# Patient Record
Sex: MALE | Race: WHITE | NOT HISPANIC OR LATINO | Employment: FULL TIME | URBAN - METROPOLITAN AREA
[De-identification: names, ages, dates, MRNs, and addresses within clinical notes are randomized per-mention and may not be internally consistent; named-entity substitution may affect disease eponyms.]

---

## 2017-01-05 ENCOUNTER — HOSPITAL ENCOUNTER (OUTPATIENT)
Dept: OTHER | Facility: HOSPITAL | Age: 57
Setting detail: SPECIMEN
Discharge: HOME | End: 2017-01-05
Attending: ORTHOPAEDIC SURGERY | Admitting: ORTHOPAEDIC SURGERY

## 2019-04-01 ENCOUNTER — HOSPITAL ENCOUNTER (OUTPATIENT)
Dept: FAMILY MEDICINE CLINIC | Facility: CLINIC | Age: 59
Setting detail: SPECIMEN
Discharge: HOME | End: 2019-04-01
Attending: NURSE PRACTITIONER | Admitting: NURSE PRACTITIONER

## 2019-04-01 LAB
ALBUMIN SERPL-MCNC: 3.9 G/DL (ref 3.5–4.8)
ALBUMIN/GLOB SERPL: 1.7 {RATIO} (ref 1–1.7)
ALP SERPL-CCNC: 81 IU/L (ref 32–91)
ALT SERPL-CCNC: 25 IU/L (ref 17–63)
ANION GAP SERPL CALC-SCNC: 13.4 MMOL/L (ref 10–20)
AST SERPL-CCNC: 18 IU/L (ref 15–41)
BASOPHILS # BLD AUTO: 0.1 10*3/UL (ref 0–0.2)
BASOPHILS NFR BLD AUTO: 1 % (ref 0–2)
BILIRUB SERPL-MCNC: 0.4 MG/DL (ref 0.3–1.2)
BUN SERPL-MCNC: 19 MG/DL (ref 8–20)
BUN/CREAT SERPL: 21.1 (ref 6.2–20.3)
CALCIUM SERPL-MCNC: 9.3 MG/DL (ref 8.9–10.3)
CHLORIDE SERPL-SCNC: 102 MMOL/L (ref 101–111)
CHOLEST SERPL-MCNC: 202 MG/DL
CHOLEST/HDLC SERPL: 5.3 {RATIO}
CONV CO2: 28 MMOL/L (ref 22–32)
CONV LDL CHOLESTEROL DIRECT: 154 MG/DL (ref 0–100)
CONV TOTAL PROTEIN: 6.2 G/DL (ref 6.1–7.9)
CREAT UR-MCNC: 0.9 MG/DL (ref 0.7–1.2)
DIFFERENTIAL METHOD BLD: (no result)
EOSINOPHIL # BLD AUTO: 0.1 10*3/UL (ref 0–0.3)
EOSINOPHIL # BLD AUTO: 2 % (ref 0–3)
ERYTHROCYTE [DISTWIDTH] IN BLOOD BY AUTOMATED COUNT: 13.6 % (ref 11.5–14.5)
GLOBULIN UR ELPH-MCNC: 2.3 G/DL (ref 2.5–3.8)
GLUCOSE SERPL-MCNC: 85 MG/DL (ref 65–99)
HCT VFR BLD AUTO: 49.9 % (ref 40–54)
HDLC SERPL-MCNC: 38 MG/DL
HGB BLD-MCNC: 16.7 G/DL (ref 14–18)
LDLC/HDLC SERPL: 4.1 {RATIO}
LIPID INTERPRETATION: ABNORMAL
LYMPHOCYTES # BLD AUTO: 2.4 10*3/UL (ref 0.8–4.8)
LYMPHOCYTES NFR BLD AUTO: 28 % (ref 18–42)
MCH RBC QN AUTO: 32.5 PG (ref 26–32)
MCHC RBC AUTO-ENTMCNC: 33.4 G/DL (ref 32–36)
MCV RBC AUTO: 97.2 FL (ref 80–94)
MONOCYTES # BLD AUTO: 0.9 10*3/UL (ref 0.1–1.3)
MONOCYTES NFR BLD AUTO: 10 % (ref 2–11)
NEUTROPHILS # BLD AUTO: 5.1 10*3/UL (ref 2.3–8.6)
NEUTROPHILS NFR BLD AUTO: 59 % (ref 50–75)
NRBC BLD AUTO-RTO: 0 /100{WBCS}
NRBC/RBC NFR BLD MANUAL: 0 10*3/UL
PLATELET # BLD AUTO: 182 10*3/UL (ref 150–450)
PMV BLD AUTO: 8.1 FL (ref 7.4–10.4)
POTASSIUM SERPL-SCNC: 4.4 MMOL/L (ref 3.6–5.1)
RBC # BLD AUTO: 5.14 10*6/UL (ref 4.6–6)
SODIUM SERPL-SCNC: 139 MMOL/L (ref 136–144)
TRIGL SERPL-MCNC: 181 MG/DL
VLDLC SERPL CALC-MCNC: 10 MG/DL
WBC # BLD AUTO: 8.6 10*3/UL (ref 4.5–11.5)

## 2019-06-01 ENCOUNTER — TRANSCRIBE ORDERS (OUTPATIENT)
Dept: SLEEP MEDICINE | Facility: HOSPITAL | Age: 59
End: 2019-06-01

## 2019-06-01 DIAGNOSIS — R06.83 SNORING: Primary | ICD-10-CM

## 2019-06-18 ENCOUNTER — TELEPHONE (OUTPATIENT)
Dept: FAMILY MEDICINE CLINIC | Facility: CLINIC | Age: 59
End: 2019-06-18

## 2019-06-18 DIAGNOSIS — J44.9 COPD, MILD (HCC): Primary | ICD-10-CM

## 2019-06-18 DIAGNOSIS — R05.9 COUGH: ICD-10-CM

## 2019-06-18 NOTE — TELEPHONE ENCOUNTER
You are wanting patient to have PFT pre/post Bronchodilator. Please create order/referral and diagnosis in chart then this will fall in to my work queue to process referral.   PER Epic TEAM

## 2019-06-20 ENCOUNTER — TRANSCRIBE ORDERS (OUTPATIENT)
Dept: SLEEP MEDICINE | Facility: HOSPITAL | Age: 59
End: 2019-06-20

## 2019-06-27 ENCOUNTER — HOSPITAL ENCOUNTER (OUTPATIENT)
Dept: RESPIRATORY THERAPY | Facility: HOSPITAL | Age: 59
Discharge: HOME | End: 2019-06-27
Admitting: FAMILY MEDICINE

## 2019-06-27 VITALS — BODY MASS INDEX: 44.1 KG/M2 | WEIGHT: 315 LBS | HEART RATE: 67 BPM | HEIGHT: 71 IN | OXYGEN SATURATION: 99 %

## 2019-06-27 DIAGNOSIS — J44.9 COPD, MILD (HCC): ICD-10-CM

## 2019-06-27 DIAGNOSIS — R05.9 COUGH: ICD-10-CM

## 2019-06-27 PROCEDURE — 94640 AIRWAY INHALATION TREATMENT: CPT

## 2019-06-27 PROCEDURE — 94726 PLETHYSMOGRAPHY LUNG VOLUMES: CPT

## 2019-06-27 PROCEDURE — 94729 DIFFUSING CAPACITY: CPT

## 2019-06-27 PROCEDURE — 94060 EVALUATION OF WHEEZING: CPT

## 2019-06-27 RX ORDER — ALBUTEROL SULFATE 2.5 MG/3ML
2.5 SOLUTION RESPIRATORY (INHALATION) ONCE AS NEEDED
Status: COMPLETED | OUTPATIENT
Start: 2019-06-27 | End: 2019-06-27

## 2019-06-27 RX ADMIN — ALBUTEROL SULFATE 2.5 MG: 2.5 SOLUTION RESPIRATORY (INHALATION) at 16:21

## 2019-07-10 RX ORDER — HYDROCODONE BITARTRATE AND ACETAMINOPHEN 10; 325 MG/1; MG/1
1 TABLET ORAL 2 TIMES DAILY PRN
Qty: 60 TABLET | Refills: 0 | Status: CANCELLED | OUTPATIENT
Start: 2019-07-10

## 2019-07-10 RX ORDER — HYDROCODONE BITARTRATE AND ACETAMINOPHEN 10; 325 MG/1; MG/1
1 TABLET ORAL 2 TIMES DAILY PRN
Refills: 0 | COMMUNITY
Start: 2019-06-14 | End: 2019-07-11 | Stop reason: SDUPTHER

## 2019-07-11 ENCOUNTER — TELEPHONE (OUTPATIENT)
Dept: FAMILY MEDICINE CLINIC | Facility: CLINIC | Age: 59
End: 2019-07-11

## 2019-07-11 ENCOUNTER — OFFICE VISIT (OUTPATIENT)
Dept: SLEEP MEDICINE | Facility: HOSPITAL | Age: 59
End: 2019-07-11

## 2019-07-11 VITALS
OXYGEN SATURATION: 95 % | SYSTOLIC BLOOD PRESSURE: 132 MMHG | DIASTOLIC BLOOD PRESSURE: 83 MMHG | WEIGHT: 311.8 LBS | BODY MASS INDEX: 43.65 KG/M2 | HEART RATE: 68 BPM | HEIGHT: 71 IN

## 2019-07-11 DIAGNOSIS — G47.30 SLEEP APNEA, UNSPECIFIED TYPE: Primary | ICD-10-CM

## 2019-07-11 PROCEDURE — G0463 HOSPITAL OUTPT CLINIC VISIT: HCPCS

## 2019-07-11 RX ORDER — HYDROCODONE BITARTRATE AND ACETAMINOPHEN 10; 325 MG/1; MG/1
1 TABLET ORAL 2 TIMES DAILY PRN
Qty: 60 TABLET | Refills: 0 | Status: SHIPPED | OUTPATIENT
Start: 2019-07-11 | End: 2019-08-27 | Stop reason: SDUPTHER

## 2019-07-11 NOTE — PROGRESS NOTES
SLEEP MEDICINE CONSULT      Patient Identification:     Name: Chi Ramirez II   Age: 58 y.o.   Gender: male   : 1960   MRN: [id number]     Date of consult: 2019    PCP/Referring Physician(s):     PCP: Shivam Mixon MD  Referring Provider: Shivam Mixon MD      Chief Complaint:     Nocturnal snores associated with suspected apneic events and daytime hypersomnolence.  History of Present Illness:   Is a very pleasant 58 years old  gentleman who has felt tired and somnolent during the daytime.  He has dozed off during sedentary activities.  He has dozed off at work.  He has found his  sleep quite un restorative.  He has snored quite loudly at night.  His wife has suspected apneic events. She made him aware of his uneven nocturnal breathing pattern..  He has discussed his symptoms with his primary care physician.  He has been referred to sleep center for further management.    His usual bedtime is between 9:30 PM to 10.30 p.m. usually watches TV before dozing off.  It may take him 30 minutes to fall asleep.  He likes ambient noise.  His fan runs all night.  He finds the noise soothing.  He usually wakes up at 4:30 in the morning to start his day.  As mentioned above he has snored for some time.  His snoring has become much more loud and disruptive in last 2 years.  His wife has suspected apneic events as she has described his respiratory events as shallow breathing.  He denies any symptoms of headache or heartburn at night or in the morning.  he has occasionally woken up in the morning with sinus congestion.  He has frequently woken up at night with dry mouth.    He sometimes has difficulty falling asleep.  He has often taken sleeping aids in the past in order to fall asleep.  Any fluctuation in the room temperature has bothered him.    He sometimes dream at night.  Denies any symptoms of nightmares.  Though his vivid dreams has some times woken him up.  He is a restless sleeper.  Isabella  and turn at night.  He throws his arms and legs in his sleep.  He has been made aware of his habit of kicking in his  sleep.  No leg cramps. No  symptoms of hypnopompic or hypnagogic hallucinations.  No symptoms of bruxism.    He wakes up in the morning tired and somnolent.  He has to sit at the edge of the bed for at least 15 minutes in order to get his bearings.  He likes to drink 3 cups of coffee in the morning.  One soda pop during the daytime 16 ounces of tea after work.  He likes to smoke 30 cigarettes/day he has smoked most in the morning before work and after work.    He is to doze off during sedentary activities as high chance of dozing off or sitting and reading sitting inactive in a public place or laying down to rest in the afternoon when circumstances permit him to do so.  He can easily doze off if he sitting quietly after lunch.  His Onley sleepiness score is 17/24.    Allergies, Medications, and Past History:   Allergies:    Allergies not on file  Current Medications:    Prior to Admission medications    Medication Sig Start Date End Date Taking? Authorizing Provider   fluticasone-salmeterol (ADVAIR DISKUS) 250-50 MCG/DOSE DISKUS Inhale 1 puff 2 (Two) Times a Day. 7/5/19   Shivam Mixon MD   HYDROcodone-acetaminophen (NORCO)  MG per tablet Take 1 tablet by mouth 2 (Two) Times a Day As Needed for Moderate Pain  or Severe Pain . 7/11/19   Shivam Mixon MD   HYDROcodone-acetaminophen (NORCO)  MG per tablet Take 1 tablet by mouth 2 (Two) Times a Day As Needed. 6/14/19 7/11/19  ProviderMinnie MD     Past Medical History:    Past Medical History:   Diagnosis Date   • Allergic rhinitis    • Erectile dysfunction    • Genital herpes       Past Surgical History:    Past Surgical History:   Procedure Laterality Date   • LIPOMA EXCISION     • OTHER SURGICAL HISTORY      LT KNEE MENISCAL TEAR   • OTHER SURGICAL HISTORY Left     SHOULDER ROTATOR CUFF   • VASECTOMY        Social History:  "   Social History     Tobacco Use   • Smoking status: Current Every Day Smoker   • Smokeless tobacco: Never Used   Substance Use Topics   • Alcohol use: Yes     Frequency: Monthly or less   • Drug use: No     Family Medical History:  History reviewed. No pertinent family history.      Review of Systems:   Constitutional:  Denies fever or chills.  Continues to gain weight.   Eyes:  Denies change in visual acuity   HENT: He experiences nasal congestion and r post nasal drainage .  He becomes more symptomatic during spring and less symptomatic during fall.  He has never been tested for allergies.  He does not take any medications for it.   Respiratory:  Denies cough, shortness of breath .  He occasionally has dyspnea on exertion  .  He was hospitalized for pneumonia after clearing the past year field.  He has a diagnosis of COPD.  He is a current smoker.  Cardiovascular:  Denies chest pain or edema.   GI:  Denies abdominal pain, nausea, vomiting, bloody stools or diarrhea   :  Denies dysuria or nocturia   Musculoskeletal: Arthritis involving different joints.  Takes narcotic for pain relief.  Integument:  Denies rash   Neurologic:  Denies headache, focal weakness or sensory changes. No history of stroke or seizures.   Endocrine:  Denies polyuria or polydipsia   Lymphatic:  Denies swollen glands   Psychiatric:  Denies depression, anxiety or claustrophobia      Physical Exam:     Vitals:    07/11/19 1600   BP: 132/83   Pulse: 68   SpO2: 95%   Weight: (!) 141 kg (311 lb 12.8 oz)   Height: 180.3 cm (71\")     HEENT: Head is normocephalic, atraumatic, normal distribution of hair. Pupils reactive to light. Ocular movements intact.  Mild nasal congestion on sniff test. No deviated nasal septum. No micrognathia.  Throat: Mallapatti stage 4, tongue midline, mucosa moist.  Neck: no JVD, thyromegaly, mass, +midline trachea, Neck circumference 21 inches.  Short neck.  Lungs: clear, no wheeze, rhonchi, crackles.  Mildly decreased " air entry bilateral lower lobes.  He is able to speak in full sentences.  Cardiovascular: S1, S2, RRR no murmurs, rubs or gallops  Abd: +BS's soft NT/ND, no CVA tenderness.  Obese.   Ext: Trace edema, cyanosis, clubbing, pulses intact  Neuro: Awake alert, oriented to time place and person. Grossly intact to motor, sensory cerebral, and cerebellar (without focal deficit)  Psych: No overt beba, depression, psychosis or inappropriate behavior  Skin: No rash, cellulitis, or ulcerations.  No facial rash or erosions      Assessment/Plan:   Sleep apnea unspecified: This is a very pleasant gentleman who has snored for a long time.  His wife has suspected apneic events.  He has felt tired and somnolent during the daytime.  He has dozed off during sedentary activities.  He carries a CDL license.  Obstructive sleep apnea has to be ruled out.  Recommendation.  Split-night polysomnogram is recommended.  Obesity; based on BMI 43.5.  He continues to gain weight.  Recommendation.  Aggressive weight loss is recommended.  Allergic rhinitis; he has remained symptomatic.  His symptoms worsens during spring.  He was recently hospitalized with pneumonia after getting a field.  Recommendation.  Aggressive therapy is recommended.  He may benefit from allergy testing.    Driving instructions. Patient is advised to avoid driving if tired or somnolent. To avoid all alcoholic beverages or medications causing somnolence.         Diagnosis Plan   1. Sleep apnea, unspecified type  Polysomnography 4 or More Parameters     No orders of the defined types were placed in this encounter.    Orders Placed This Encounter   Procedures   • Polysomnography 4 or More Parameters     Standing Status:   Future     Standing Expiration Date:   7/11/2020     Order Specific Question:   May take own meds     Answer:   Yes     Order Specific Question:   Details     Answer:   O2 Implementation per Protocol         Dai Ramos MD  7/11/2019  4:48 PM

## 2019-08-02 RX ORDER — LOSARTAN POTASSIUM 100 MG/1
TABLET ORAL
Qty: 30 TABLET | Refills: 0 | Status: SHIPPED | OUTPATIENT
Start: 2019-08-02 | End: 2020-01-06 | Stop reason: SDUPTHER

## 2019-08-02 RX ORDER — METOPROLOL SUCCINATE 50 MG/1
TABLET, EXTENDED RELEASE ORAL
Qty: 30 TABLET | Refills: 0 | Status: SHIPPED | OUTPATIENT
Start: 2019-08-02 | End: 2020-05-26 | Stop reason: SDUPTHER

## 2019-08-27 RX ORDER — HYDROCODONE BITARTRATE AND ACETAMINOPHEN 10; 325 MG/1; MG/1
TABLET ORAL
Qty: 60 TABLET | Refills: 0 | Status: SHIPPED | OUTPATIENT
Start: 2019-08-27 | End: 2019-09-30

## 2019-09-30 ENCOUNTER — OFFICE VISIT (OUTPATIENT)
Dept: FAMILY MEDICINE CLINIC | Facility: CLINIC | Age: 59
End: 2019-09-30

## 2019-09-30 VITALS
SYSTOLIC BLOOD PRESSURE: 128 MMHG | RESPIRATION RATE: 16 BRPM | HEIGHT: 71 IN | DIASTOLIC BLOOD PRESSURE: 76 MMHG | HEART RATE: 73 BPM | WEIGHT: 306.6 LBS | OXYGEN SATURATION: 93 % | BODY MASS INDEX: 42.92 KG/M2 | TEMPERATURE: 97.9 F

## 2019-09-30 DIAGNOSIS — G47.10 HYPERSOMNIA: ICD-10-CM

## 2019-09-30 DIAGNOSIS — E66.01 CLASS 3 SEVERE OBESITY DUE TO EXCESS CALORIES WITH SERIOUS COMORBIDITY AND BODY MASS INDEX (BMI) OF 40.0 TO 44.9 IN ADULT (HCC): ICD-10-CM

## 2019-09-30 DIAGNOSIS — F17.200 TOBACCO USE DISORDER: ICD-10-CM

## 2019-09-30 DIAGNOSIS — M79.605 PAIN IN BOTH LOWER EXTREMITIES: ICD-10-CM

## 2019-09-30 DIAGNOSIS — Z01.89 NEED FOR ASSESSMENT FOR SLEEP APNEA: ICD-10-CM

## 2019-09-30 DIAGNOSIS — I10 ESSENTIAL HYPERTENSION: Primary | ICD-10-CM

## 2019-09-30 DIAGNOSIS — E55.9 VITAMIN D DEFICIENCY: ICD-10-CM

## 2019-09-30 DIAGNOSIS — J41.0 SIMPLE CHRONIC BRONCHITIS (HCC): ICD-10-CM

## 2019-09-30 DIAGNOSIS — M79.604 PAIN IN BOTH LOWER EXTREMITIES: ICD-10-CM

## 2019-09-30 PROBLEM — E66.9 OBESITY: Status: ACTIVE | Noted: 2019-04-01

## 2019-09-30 PROBLEM — R06.02 SHORTNESS OF BREATH: Status: ACTIVE | Noted: 2019-04-01

## 2019-09-30 PROBLEM — IMO0002 HYPOGONADISM: Status: ACTIVE | Noted: 2017-09-11

## 2019-09-30 PROBLEM — M79.606 PAIN OF LOWER EXTREMITY: Status: ACTIVE | Noted: 2019-05-13

## 2019-09-30 PROBLEM — J44.9 CHRONIC OBSTRUCTIVE PULMONARY DISEASE (HCC): Status: ACTIVE | Noted: 2019-06-12

## 2019-09-30 PROBLEM — M79.10 MYALGIA: Status: ACTIVE | Noted: 2017-04-20

## 2019-09-30 PROBLEM — M15.9 POLYOSTEOARTHRITIS: Status: ACTIVE | Noted: 2017-11-27

## 2019-09-30 PROCEDURE — 99214 OFFICE O/P EST MOD 30 MIN: CPT | Performed by: FAMILY MEDICINE

## 2019-09-30 RX ORDER — MODAFINIL 200 MG/1
200 TABLET ORAL DAILY
Qty: 30 TABLET | Refills: 5 | Status: SHIPPED | OUTPATIENT
Start: 2019-09-30 | End: 2021-02-02

## 2019-09-30 RX ORDER — HYDROCODONE BITARTRATE AND ACETAMINOPHEN 10; 325 MG/1; MG/1
1 TABLET ORAL 3 TIMES DAILY PRN
Qty: 90 TABLET | Refills: 0 | Status: SHIPPED | OUTPATIENT
Start: 2019-09-30 | End: 2019-10-31 | Stop reason: SDUPTHER

## 2019-10-01 ENCOUNTER — TELEPHONE (OUTPATIENT)
Dept: FAMILY MEDICINE CLINIC | Facility: CLINIC | Age: 59
End: 2019-10-01

## 2019-10-01 PROBLEM — Z01.89 NEED FOR ASSESSMENT FOR SLEEP APNEA: Status: ACTIVE | Noted: 2019-10-01

## 2019-10-01 NOTE — ASSESSMENT & PLAN NOTE
This patient desperately needs a sleep study, this is going to be a big issue for him.  This was discussed in detail with his job which should not be in jeopardy if he is doing what he needs to do to take care of his problem

## 2019-10-01 NOTE — ASSESSMENT & PLAN NOTE
Obesity is improving with lifestyle modifications.  Discussed the patient's BMI.  The BMI is above average; BMI management plan is completed.  General weight loss/lifestyle modification strategies discussed (elicit support from others; identify saboteurs; non-food rewards, etc).  Regular aerobic exercise program discussed.   Patient desperately needs to lose weight and I discussed this with him

## 2019-10-31 RX ORDER — HYDROCODONE BITARTRATE AND ACETAMINOPHEN 10; 325 MG/1; MG/1
TABLET ORAL
Qty: 90 TABLET | Refills: 0 | Status: SHIPPED | OUTPATIENT
Start: 2019-10-31 | End: 2019-12-08 | Stop reason: SDUPTHER

## 2019-12-08 DIAGNOSIS — M79.605 PAIN IN BOTH LOWER EXTREMITIES: Primary | ICD-10-CM

## 2019-12-08 DIAGNOSIS — M79.604 PAIN IN BOTH LOWER EXTREMITIES: Primary | ICD-10-CM

## 2019-12-09 RX ORDER — HYDROCODONE BITARTRATE AND ACETAMINOPHEN 10; 325 MG/1; MG/1
TABLET ORAL
Qty: 90 TABLET | Refills: 0 | Status: SHIPPED | OUTPATIENT
Start: 2019-12-09 | End: 2020-01-06 | Stop reason: SDUPTHER

## 2020-01-06 ENCOUNTER — OFFICE VISIT (OUTPATIENT)
Dept: FAMILY MEDICINE CLINIC | Facility: CLINIC | Age: 60
End: 2020-01-06

## 2020-01-06 VITALS
HEART RATE: 69 BPM | SYSTOLIC BLOOD PRESSURE: 134 MMHG | WEIGHT: 313.8 LBS | DIASTOLIC BLOOD PRESSURE: 79 MMHG | BODY MASS INDEX: 43.77 KG/M2 | RESPIRATION RATE: 18 BRPM

## 2020-01-06 DIAGNOSIS — R97.20 ELEVATED PSA: ICD-10-CM

## 2020-01-06 DIAGNOSIS — M79.605 PAIN IN BOTH LOWER EXTREMITIES: ICD-10-CM

## 2020-01-06 DIAGNOSIS — J41.0 SIMPLE CHRONIC BRONCHITIS (HCC): ICD-10-CM

## 2020-01-06 DIAGNOSIS — M79.604 PAIN IN BOTH LOWER EXTREMITIES: ICD-10-CM

## 2020-01-06 DIAGNOSIS — I10 ESSENTIAL HYPERTENSION: ICD-10-CM

## 2020-01-06 DIAGNOSIS — E78.00 PURE HYPERCHOLESTEROLEMIA: Primary | ICD-10-CM

## 2020-01-06 DIAGNOSIS — E55.9 VITAMIN D DEFICIENCY: ICD-10-CM

## 2020-01-06 DIAGNOSIS — F17.200 TOBACCO USE DISORDER: ICD-10-CM

## 2020-01-06 DIAGNOSIS — J44.1 COPD EXACERBATION (HCC): ICD-10-CM

## 2020-01-06 DIAGNOSIS — Z01.89 NEED FOR ASSESSMENT FOR SLEEP APNEA: ICD-10-CM

## 2020-01-06 DIAGNOSIS — Z23 NEED FOR IMMUNIZATION AGAINST INFLUENZA: ICD-10-CM

## 2020-01-06 PROCEDURE — 36415 COLL VENOUS BLD VENIPUNCTURE: CPT | Performed by: FAMILY MEDICINE

## 2020-01-06 PROCEDURE — 82306 VITAMIN D 25 HYDROXY: CPT | Performed by: FAMILY MEDICINE

## 2020-01-06 PROCEDURE — 84153 ASSAY OF PSA TOTAL: CPT | Performed by: FAMILY MEDICINE

## 2020-01-06 PROCEDURE — 99214 OFFICE O/P EST MOD 30 MIN: CPT | Performed by: FAMILY MEDICINE

## 2020-01-06 PROCEDURE — 80061 LIPID PANEL: CPT | Performed by: FAMILY MEDICINE

## 2020-01-06 RX ORDER — ATORVASTATIN CALCIUM 10 MG/1
TABLET, FILM COATED ORAL
COMMUNITY
Start: 2019-10-31 | End: 2020-05-26 | Stop reason: SDUPTHER

## 2020-01-06 RX ORDER — ERGOCALCIFEROL 1.25 MG/1
CAPSULE ORAL
COMMUNITY
Start: 2020-01-02 | End: 2020-01-06

## 2020-01-06 RX ORDER — OMEPRAZOLE 40 MG/1
CAPSULE, DELAYED RELEASE ORAL
COMMUNITY
Start: 2019-12-09 | End: 2020-03-06

## 2020-01-06 RX ORDER — LOSARTAN POTASSIUM 100 MG/1
100 TABLET ORAL DAILY
Qty: 90 TABLET | Refills: 3 | Status: SHIPPED | OUTPATIENT
Start: 2020-01-06 | End: 2021-01-28

## 2020-01-06 RX ORDER — ERGOCALCIFEROL 1.25 MG/1
50000 CAPSULE ORAL
Qty: 12 CAPSULE | Refills: 3 | COMMUNITY
Start: 2020-01-06 | End: 2020-03-16 | Stop reason: SDUPTHER

## 2020-01-06 RX ORDER — TRIAMCINOLONE ACETONIDE 40 MG/ML
80 INJECTION, SUSPENSION INTRA-ARTICULAR; INTRAMUSCULAR ONCE
Status: DISCONTINUED | OUTPATIENT
Start: 2020-01-06 | End: 2021-02-02

## 2020-01-06 RX ORDER — FLUTICASONE PROPIONATE 50 MCG
2 SPRAY, SUSPENSION (ML) NASAL DAILY
Qty: 1 BOTTLE | Refills: 11 | Status: SHIPPED | OUTPATIENT
Start: 2020-01-06 | End: 2020-06-24 | Stop reason: SDUPTHER

## 2020-01-06 RX ORDER — HYDROCODONE BITARTRATE AND ACETAMINOPHEN 10; 325 MG/1; MG/1
1 TABLET ORAL 3 TIMES DAILY PRN
Qty: 90 TABLET | Refills: 0 | Status: SHIPPED | OUTPATIENT
Start: 2020-01-06 | End: 2020-02-10

## 2020-01-06 RX ORDER — CYCLOBENZAPRINE HCL 10 MG
10 TABLET ORAL
COMMUNITY
Start: 2019-08-16 | End: 2021-04-05

## 2020-01-06 NOTE — PROGRESS NOTES
Rooming Tab(CC,VS,Pt Hx,Fall Screen)  Chief Complaint   Patient presents with   • Hypertension   • Hyperlipidemia       Subjective 59-year-old here for follow-up of his chronic left leg pain and foot pain from an injury years ago.  He is taking his Norco 3 times a day and it is helping his pain.  Some days are better than others.  Patient complains of trouble  Sleeping because he gets so congested in his nose at nighttime.  It comes and goes and he has never been allergy tested.  Is mostly congestion he does not have any runny nose.  The patient was noted to have a PSA from 1.3 in 2018-2.5 in 2019.  He needs to have this reevaluated.  He has no prostate symptoms.  The patient feels like he is coughing more than usual and having some wheezing and rattling in his chest that started 2 nights ago.  He has no fever.  He has no chills.  He is not really short of breath.  The patient has vitamin D deficiency and he is taking ergocalciferol 50,000 units weekly.  He needs a refill of this as well.  Patient has hyperlipidemia and he needs to have his cholesterol rechecked.  He fasted today and he is not certain if he is taking his cholesterol medicine or not.  She has hypertension and he is taking his losartan and metoprolol tartrate he denies any chest pain dizziness or syncope.  She is stressed out because he has no money and is not able to afford anything or do anything he likes to do      I have reviewed and updated his medications, medical history and problem list during today's office visit.     Patient Care Team:  Shivam Mixon MD as PCP - General (Family Medicine)    Problem List Tab  Medications Tab  Synopsis Tab  Chart Review Tab  Care Everywhere Tab  Immunizations Tab  Patient History Tab    Social History     Tobacco Use   • Smoking status: Current Every Day Smoker   • Smokeless tobacco: Never Used   Substance Use Topics   • Alcohol use: Yes     Frequency: Monthly or less       Review of Systems    Constitutional: Negative for chills, fatigue and fever.   HENT: Positive for congestion. Negative for nosebleeds.    Eyes: Negative for double vision.   Respiratory: Positive for cough and wheezing. Negative for chest tightness and shortness of breath.    Cardiovascular: Negative for chest pain and palpitations.   Gastrointestinal: Negative for blood in stool.   Genitourinary: Negative for dysuria and hematuria.   Neurological: Negative for dizziness and syncope.   Psychiatric/Behavioral: Negative for depressed mood.       Objective     Rooming Tab(CC,VS,Pt Hx,Fall Screen)  /79 (BP Location: Right arm, Patient Position: Sitting, Cuff Size: Large Adult)   Pulse 69   Resp 18   Wt (!) 142 kg (313 lb 12.8 oz)   BMI 43.77 kg/m²     Body mass index is 43.77 kg/m².    Physical Exam   Constitutional: He is oriented to person, place, and time. He appears well-developed and well-nourished. No distress.   Obese pleasant no acute distress   HENT:   Head: Normocephalic and atraumatic.   Nose: Nose normal.   Mouth/Throat: Oropharynx is clear and moist.   Congested nose, throat clear, TMs dull but not red, nontender sinuses   Eyes: Pupils are equal, round, and reactive to light. Conjunctivae, EOM and lids are normal.   Neck: Trachea normal and normal range of motion. Neck supple. No JVD present. Carotid bruit is not present. No thyroid mass and no thyromegaly present.   No carotid bruits   Cardiovascular: Normal rate, regular rhythm, normal heart sounds and intact distal pulses.   Pulmonary/Chest: Effort normal. No respiratory distress. He has wheezes.   Abdominal: Soft. Bowel sounds are normal.   Obese   Musculoskeletal:   No c/c/e   Neurological: He is alert and oriented to person, place, and time. No cranial nerve deficit.   Skin: Skin is warm and dry.   Psychiatric: He has a normal mood and affect. His speech is normal and behavior is normal. He is attentive.   Nursing note and vitals reviewed.       Statin Choice  Calculator  Data Reviewed:                   Assessment/Plan   Order Review Tab  Health Maintenance Tab  Patient Plan/Order Tab  Diagnoses and all orders for this visit:    1. Pure hypercholesterolemia (Primary)  Assessment & Plan:  Lipid abnormalities are Uncertain as the patient does not know if he is taking his medication.  Pharmacotherapy as ordered.  Lipids will be reassessed 4 months.    Orders:  -     Lipid Panel    2. Pain in both lower extremities  -     HYDROcodone-acetaminophen (NORCO)  MG per tablet; Take 1 tablet by mouth 3 (Three) Times a Day As Needed for Moderate Pain .  Dispense: 90 tablet; Refill: 0    3. Essential hypertension  Assessment & Plan:  Hypertension is improving with treatment.  Continue current treatment regimen.  Dietary sodium restriction.  Weight loss.  Regular aerobic exercise.  Continue current medications.  Blood pressure will be reassessed at the next regular appointment.      4. Vitamin D deficiency  Assessment & Plan:  Improved, continue ergocalciferol for now, check a level     Orders:  -     Vitamin D 25 Hydroxy    5. Elevated PSA  -     PSA DIAGNOSTIC    6. Simple chronic bronchitis (CMS/HCC)  -     triamcinolone acetonide (KENALOG-40) injection 80 mg    7. COPD exacerbation (CMS/HCC)  Assessment & Plan:  Worse  Strongly encourage patient to stop smoking cigarettes.  Kenalog 80 mg IM.  If persistent we will consider adding prednisone.  Recommend he restart an Advair disc as this seemed to help him at the time          8. Tobacco use disorder  Assessment & Plan:  Stop smoking      9. Need for assessment for sleep apnea  Assessment & Plan:  Patient is not going to do a sleep study unfortunately      Other orders  -     fluticasone (FLONASE) 50 MCG/ACT nasal spray; 2 sprays into the nostril(s) as directed by provider Daily.  Dispense: 1 bottle; Refill: 11  -     losartan (COZAAR) 100 MG tablet; Take 1 tablet by mouth Daily.  Dispense: 90 tablet; Refill: 3  -      fluticasone-salmeterol (ADVAIR DISKUS) 250-50 MCG/DOSE DISKUS; Inhale 1 puff 2 (Two) Times a Day.  Dispense: 60 each; Refill: 11  -     vitamin D (ERGOCALCIFEROL) 1.25 MG (38487 UT) capsule capsule; Take 1 capsule by mouth Every 7 (Seven) Days.  Dispense: 12 capsule; Refill: 3      Wrapup Tab  Return in about 4 months (around 5/6/2020) for Recheck.

## 2020-01-06 NOTE — ASSESSMENT & PLAN NOTE
Lipid abnormalities are Uncertain as the patient does not know if he is taking his medication.  Pharmacotherapy as ordered.  Lipids will be reassessed 4 months.

## 2020-01-06 NOTE — ASSESSMENT & PLAN NOTE
Worse  Strongly encourage patient to stop smoking cigarettes.  Kenalog 80 mg IM.  If persistent we will consider adding prednisone.  Recommend he restart an Advair disc as this seemed to help him at the time

## 2020-01-07 LAB
25(OH)D3 SERPL-MCNC: 45.7 NG/ML (ref 30–100)
CHOLEST SERPL-MCNC: 154 MG/DL (ref 0–200)
HDLC SERPL-MCNC: 45 MG/DL (ref 40–60)
LDLC SERPL CALC-MCNC: 91 MG/DL (ref 0–100)
LDLC/HDLC SERPL: 2.01 {RATIO}
PSA SERPL-MCNC: 2.49 NG/ML (ref 0–4)
TRIGL SERPL-MCNC: 92 MG/DL (ref 0–150)
VLDLC SERPL-MCNC: 18.4 MG/DL (ref 5–40)

## 2020-02-10 DIAGNOSIS — M79.605 PAIN IN BOTH LOWER EXTREMITIES: ICD-10-CM

## 2020-02-10 DIAGNOSIS — M79.604 PAIN IN BOTH LOWER EXTREMITIES: ICD-10-CM

## 2020-02-10 RX ORDER — HYDROCODONE BITARTRATE AND ACETAMINOPHEN 10; 325 MG/1; MG/1
TABLET ORAL
Qty: 90 TABLET | Refills: 0 | Status: SHIPPED | OUTPATIENT
Start: 2020-02-10 | End: 2020-03-16 | Stop reason: SDUPTHER

## 2020-03-06 RX ORDER — OMEPRAZOLE 40 MG/1
CAPSULE, DELAYED RELEASE ORAL
Qty: 90 CAPSULE | Refills: 3 | Status: SHIPPED | OUTPATIENT
Start: 2020-03-06 | End: 2021-03-31 | Stop reason: SDUPTHER

## 2020-03-16 DIAGNOSIS — M79.604 PAIN IN BOTH LOWER EXTREMITIES: ICD-10-CM

## 2020-03-16 DIAGNOSIS — M79.605 PAIN IN BOTH LOWER EXTREMITIES: ICD-10-CM

## 2020-03-16 RX ORDER — ERGOCALCIFEROL 1.25 MG/1
50000 CAPSULE ORAL
Qty: 12 CAPSULE | Refills: 0 | Status: SHIPPED | OUTPATIENT
Start: 2020-03-16 | End: 2020-07-16

## 2020-03-16 RX ORDER — HYDROCODONE BITARTRATE AND ACETAMINOPHEN 10; 325 MG/1; MG/1
1 TABLET ORAL EVERY 8 HOURS PRN
Qty: 90 TABLET | Refills: 0 | Status: SHIPPED | OUTPATIENT
Start: 2020-03-16 | End: 2020-04-14 | Stop reason: SDUPTHER

## 2020-03-16 NOTE — TELEPHONE ENCOUNTER
PTS WIFE IS CALLING TO REQUEST REFILL FOR HYDROcodone-acetaminophen (NORCO)  MG per tablet    WIFE ALSO STATED THAT vitamin D (ERGOCALCIFEROL) 1.25 MG (83474 UT) capsule capsule IS SUPPOSED TO BE WRITTEN AS A 90 DAY BUT IT IDS BEING FILLED AS A 30 DAY PLEASE CALL PHARM AND ADVISE       PTS WIFE CAN BE REACHED [phone]

## 2020-04-14 DIAGNOSIS — M79.604 PAIN IN BOTH LOWER EXTREMITIES: ICD-10-CM

## 2020-04-14 DIAGNOSIS — M79.605 PAIN IN BOTH LOWER EXTREMITIES: ICD-10-CM

## 2020-04-15 RX ORDER — HYDROCODONE BITARTRATE AND ACETAMINOPHEN 10; 325 MG/1; MG/1
1 TABLET ORAL EVERY 8 HOURS PRN
Qty: 90 TABLET | Refills: 0 | Status: SHIPPED | OUTPATIENT
Start: 2020-04-15 | End: 2020-05-15 | Stop reason: SDUPTHER

## 2020-05-06 ENCOUNTER — OFFICE VISIT (OUTPATIENT)
Dept: FAMILY MEDICINE CLINIC | Facility: CLINIC | Age: 60
End: 2020-05-06

## 2020-05-06 VITALS
HEART RATE: 72 BPM | SYSTOLIC BLOOD PRESSURE: 130 MMHG | RESPIRATION RATE: 12 BRPM | BODY MASS INDEX: 44.77 KG/M2 | DIASTOLIC BLOOD PRESSURE: 80 MMHG | WEIGHT: 315 LBS

## 2020-05-06 DIAGNOSIS — F17.200 TOBACCO USE DISORDER: ICD-10-CM

## 2020-05-06 DIAGNOSIS — M79.605 PAIN IN BOTH LOWER EXTREMITIES: ICD-10-CM

## 2020-05-06 DIAGNOSIS — M79.604 PAIN IN BOTH LOWER EXTREMITIES: ICD-10-CM

## 2020-05-06 DIAGNOSIS — I10 ESSENTIAL HYPERTENSION: Primary | ICD-10-CM

## 2020-05-06 DIAGNOSIS — E66.01 CLASS 3 SEVERE OBESITY DUE TO EXCESS CALORIES WITH SERIOUS COMORBIDITY AND BODY MASS INDEX (BMI) OF 40.0 TO 44.9 IN ADULT (HCC): ICD-10-CM

## 2020-05-06 PROCEDURE — 99213 OFFICE O/P EST LOW 20 MIN: CPT | Performed by: FAMILY MEDICINE

## 2020-05-06 NOTE — PROGRESS NOTES
Rooming Tab(CC,VS,Pt Hx,Fall Screen)  Chief Complaint   Patient presents with   • Hypertension   • Hyperlipidemia   • Pain       Subjective 59-year-old here for follow-up, the patient is not exercising but he is active.  He is gained weight.  He is furloughed secondary to COVID-19.  He has hypertension and he has no complaints of chest pain or dizziness or syncope with exertion.  He has not been eating very well and has not been exerting exercising and his weight is up.  Patient takes Norco for chronic leg pain.  He takes his medication appropriately.  He continues to smoke cigarettes and does not seem to be living the healthiest of lifestyles.  He has hyperlipidemia and takes his atorvastatin.    I have reviewed and updated his medications, medical history and problem list during today's office visit.     Patient Care Team:  Shivam Mixon MD as PCP - General (Family Medicine)    Problem List Tab  Medications Tab  Synopsis Tab  Chart Review Tab  Care Everywhere Tab  Immunizations Tab  Patient History Tab    Social History     Tobacco Use   • Smoking status: Current Every Day Smoker     Packs/day: 1.00     Years: 25.00     Pack years: 25.00     Types: Cigarettes   • Smokeless tobacco: Never Used   • Tobacco comment: stop smokiking    Substance Use Topics   • Alcohol use: Yes     Frequency: Monthly or less     Drinks per session: 1 or 2     Binge frequency: Less than monthly       Review of Systems   Constitutional: Negative for chills, fatigue and fever.   HENT: Negative for nosebleeds.    Eyes: Negative for double vision.   Respiratory: Negative for chest tightness and shortness of breath.    Cardiovascular: Negative for chest pain and palpitations.   Gastrointestinal: Negative for blood in stool.   Genitourinary: Negative for dysuria and hematuria.   Musculoskeletal:        Bilateral leg pain   Neurological: Negative for dizziness and syncope.   Psychiatric/Behavioral: Negative for depressed mood.        Objective     Rooming Tab(CC,VS,Pt Hx,Fall Screen)  /80   Pulse 72   Resp 12   Wt (!) 146 kg (321 lb)   BMI 44.77 kg/m²     Body mass index is 44.77 kg/m².    Physical Exam   Constitutional: He is oriented to person, place, and time. He appears well-developed and well-nourished. No distress.   Obese, pleasant, no acute distress   HENT:   Head: Normocephalic and atraumatic.   Nose: Nose normal.   Mouth/Throat: Oropharynx is clear and moist.   Eyes: Pupils are equal, round, and reactive to light. Conjunctivae, EOM and lids are normal.   Neck: Trachea normal and normal range of motion. Neck supple. No JVD present. Carotid bruit is not present. No thyroid mass and no thyromegaly present.   No carotid bruits   Cardiovascular: Normal rate, regular rhythm, normal heart sounds and intact distal pulses.   Pulmonary/Chest: Effort normal and breath sounds normal.   Abdominal:   Obese   Musculoskeletal:   Trace edema in his legs   Neurological: He is alert and oriented to person, place, and time. No cranial nerve deficit.   Skin: Skin is warm and dry.   Psychiatric: He has a normal mood and affect. His speech is normal and behavior is normal. He is attentive.   Nursing note and vitals reviewed.       Statin Choice Calculator  Data Reviewed:                   Assessment/Plan   Order Review Tab  Health Maintenance Tab  Patient Plan/Order Tab  Diagnoses and all orders for this visit:    1. Essential hypertension (Primary)  Assessment & Plan:  Hypertension is improving with treatment.  Continue current treatment regimen.  Dietary sodium restriction.  Weight loss.  Regular aerobic exercise.  Stop smoking.  Continue current medications.  Blood pressure will be reassessed at the next regular appointment.      2. Class 3 severe obesity due to excess calories with serious comorbidity and body mass index (BMI) of 40.0 to 44.9 in adult (CMS/[facility])  Assessment & Plan:  Obesity is worsening.  Discussed the patient's BMI.  The  BMI Patient needs to eat a low-carb diet, he needs to start exercising in some fashion in order to lose weight.  General weight loss/lifestyle modification strategies discussed (elicit support from others; identify saboteurs; non-food rewards, etc).  Regular aerobic exercise program discussed.      3. Pain in both lower extremities  Assessment & Plan:  Persistent, will continue to use his Norco as directed.  He takes his medication appropriately.  We will do a urine drug screen today, we will also do an inspect report      4. Tobacco use disorder  Assessment & Plan:  I encouraged the patient to stop smoking         Wrapup Tab  Return in about 4 months (around 9/6/2020) for Annual physical.

## 2020-05-07 NOTE — ASSESSMENT & PLAN NOTE
Obesity is worsening.  Discussed the patient's BMI.  The BMI Patient needs to eat a low-carb diet, he needs to start exercising in some fashion in order to lose weight.  General weight loss/lifestyle modification strategies discussed (elicit support from others; identify saboteurs; non-food rewards, etc).  Regular aerobic exercise program discussed.

## 2020-05-07 NOTE — ASSESSMENT & PLAN NOTE
Persistent, will continue to use his Norco as directed.  He takes his medication appropriately.  We will do a urine drug screen today, we will also do an inspect report

## 2020-05-08 ENCOUNTER — TELEPHONE (OUTPATIENT)
Dept: FAMILY MEDICINE CLINIC | Facility: CLINIC | Age: 60
End: 2020-05-08

## 2020-05-15 ENCOUNTER — TELEPHONE (OUTPATIENT)
Dept: FAMILY MEDICINE CLINIC | Facility: CLINIC | Age: 60
End: 2020-05-15

## 2020-05-15 DIAGNOSIS — M79.604 PAIN IN BOTH LOWER EXTREMITIES: ICD-10-CM

## 2020-05-15 DIAGNOSIS — M79.605 PAIN IN BOTH LOWER EXTREMITIES: ICD-10-CM

## 2020-05-15 RX ORDER — HYDROCODONE BITARTRATE AND ACETAMINOPHEN 10; 325 MG/1; MG/1
1 TABLET ORAL EVERY 8 HOURS PRN
Qty: 90 TABLET | Refills: 0 | Status: SHIPPED | OUTPATIENT
Start: 2020-05-15 | End: 2020-06-15 | Stop reason: SDUPTHER

## 2020-05-26 ENCOUNTER — TELEPHONE (OUTPATIENT)
Dept: FAMILY MEDICINE CLINIC | Facility: CLINIC | Age: 60
End: 2020-05-26

## 2020-05-26 RX ORDER — METOPROLOL SUCCINATE 50 MG/1
50 TABLET, EXTENDED RELEASE ORAL DAILY
Qty: 30 TABLET | Refills: 0 | Status: SHIPPED | OUTPATIENT
Start: 2020-05-26 | End: 2020-06-23 | Stop reason: SDUPTHER

## 2020-05-26 RX ORDER — ATORVASTATIN CALCIUM 10 MG/1
10 TABLET, FILM COATED ORAL DAILY
Qty: 90 TABLET | Refills: 0 | Status: SHIPPED | OUTPATIENT
Start: 2020-05-26 | End: 2020-08-31

## 2020-05-26 NOTE — TELEPHONE ENCOUNTER
PATIENT CALLED IN TO REQUEST REFILLS OF    atorvastatin (LIPITOR) 10 MG tablet    metoprolol succinate XL (TOPROL-XL) 50 MG 24 hr tablet    [facility] #[id number] - [place] [address] [facility] [address] - [phone] [facility] [phone] FX

## 2020-05-26 NOTE — TELEPHONE ENCOUNTER
PATIENT RECEIVED A BILL FROM  FOR $250 FOR DRUG TEST PERFORMED AT THE OFFICE.    PLEASE CALL BACK TO DISCUSS    [phone]       I thought the urine drug screen company that did these were only going to charge the patient $100?

## 2020-05-27 NOTE — TELEPHONE ENCOUNTER
Charu,  I need a little assistance.  When was the drug screen done?  I do not see an order.  Where are results?  Patient says bill is from  but I do not see this information.

## 2020-06-05 NOTE — TELEPHONE ENCOUNTER
Called and left message on Dl Plummer's voicemail @ [phone].  Asking him to call me back to assist with patients account.

## 2020-06-12 NOTE — TELEPHONE ENCOUNTER
Called frenting at [phone] and ask to speak to the billing department.  I was sent to the voicemail of Chanelle Morrell.  I left her a message asking her to call me back

## 2020-06-15 DIAGNOSIS — M79.605 PAIN IN BOTH LOWER EXTREMITIES: ICD-10-CM

## 2020-06-15 DIAGNOSIS — M79.604 PAIN IN BOTH LOWER EXTREMITIES: ICD-10-CM

## 2020-06-15 RX ORDER — HYDROCODONE BITARTRATE AND ACETAMINOPHEN 10; 325 MG/1; MG/1
1 TABLET ORAL EVERY 8 HOURS PRN
Qty: 90 TABLET | Refills: 0 | Status: SHIPPED | OUTPATIENT
Start: 2020-06-15 | End: 2020-07-20 | Stop reason: SDUPTHER

## 2020-06-15 NOTE — TELEPHONE ENCOUNTER
PATIENT CALLED TO REQUEST REFILLS OF    HYDROcodone-acetaminophen (NORCO)  MG per tablet    [facility] #[id number] - [facility] [place] [address] [facility] [address] [place] [address] - [phone] [facility] [phone] FX

## 2020-06-23 RX ORDER — METOPROLOL SUCCINATE 50 MG/1
50 TABLET, EXTENDED RELEASE ORAL DAILY
Qty: 30 TABLET | Refills: 0 | Status: SHIPPED | OUTPATIENT
Start: 2020-06-23 | End: 2020-07-29

## 2020-06-24 RX ORDER — FLUTICASONE PROPIONATE 50 MCG
2 SPRAY, SUSPENSION (ML) NASAL DAILY
Qty: 1 BOTTLE | Refills: 11 | Status: SHIPPED | OUTPATIENT
Start: 2020-06-24 | End: 2020-09-24 | Stop reason: SDUPTHER

## 2020-07-16 RX ORDER — ERGOCALCIFEROL 1.25 MG/1
CAPSULE ORAL
Qty: 12 CAPSULE | Refills: 0 | Status: SHIPPED | OUTPATIENT
Start: 2020-07-16 | End: 2020-09-24 | Stop reason: SDUPTHER

## 2020-07-20 DIAGNOSIS — M79.604 PAIN IN BOTH LOWER EXTREMITIES: ICD-10-CM

## 2020-07-20 DIAGNOSIS — M79.605 PAIN IN BOTH LOWER EXTREMITIES: ICD-10-CM

## 2020-07-20 RX ORDER — HYDROCODONE BITARTRATE AND ACETAMINOPHEN 10; 325 MG/1; MG/1
1 TABLET ORAL EVERY 8 HOURS PRN
Qty: 90 TABLET | Refills: 0 | Status: SHIPPED | OUTPATIENT
Start: 2020-07-20 | End: 2020-08-21 | Stop reason: SDUPTHER

## 2020-07-20 NOTE — TELEPHONE ENCOUNTER
PATIENT'S WIFE TIANA CALLED FOR MED REFILL  HYDROcodone-acetaminophen (NORCO)  MG per tablet    [facility] #[id number] - [place], [place] - [facility] [address] [facility] [address] - [phone]  - [phone] FX  [phone]    CALL BACK NUMBER [phone]    HE IS OUT OF MEDICATION

## 2020-07-29 RX ORDER — METOPROLOL SUCCINATE 50 MG/1
50 TABLET, EXTENDED RELEASE ORAL DAILY
Qty: 30 TABLET | Refills: 0 | Status: SHIPPED | OUTPATIENT
Start: 2020-07-29 | End: 2020-09-01

## 2020-08-21 DIAGNOSIS — M79.604 PAIN IN BOTH LOWER EXTREMITIES: ICD-10-CM

## 2020-08-21 DIAGNOSIS — M79.605 PAIN IN BOTH LOWER EXTREMITIES: ICD-10-CM

## 2020-08-21 NOTE — TELEPHONE ENCOUNTER
Patient called in requesting a re-fill for     HYDROcodone-acetaminophen (NORCO)  MG per tablet    [address]    Best call back # [phone]

## 2020-08-23 RX ORDER — HYDROCODONE BITARTRATE AND ACETAMINOPHEN 10; 325 MG/1; MG/1
1 TABLET ORAL EVERY 8 HOURS PRN
Qty: 90 TABLET | Refills: 0 | Status: SHIPPED | OUTPATIENT
Start: 2020-08-23 | End: 2020-09-24 | Stop reason: SDUPTHER

## 2020-08-24 RX ORDER — HYDROCODONE BITARTRATE AND ACETAMINOPHEN 10; 325 MG/1; MG/1
1 TABLET ORAL EVERY 8 HOURS PRN
Qty: 90 TABLET | Refills: 0 | Status: SHIPPED | OUTPATIENT
Start: 2020-08-24 | End: 2020-10-02 | Stop reason: SDUPTHER

## 2020-08-31 RX ORDER — ATORVASTATIN CALCIUM 10 MG/1
10 TABLET, FILM COATED ORAL DAILY
Qty: 90 TABLET | Refills: 0 | Status: SHIPPED | OUTPATIENT
Start: 2020-08-31 | End: 2020-11-24

## 2020-09-01 RX ORDER — METOPROLOL SUCCINATE 50 MG/1
50 TABLET, EXTENDED RELEASE ORAL DAILY
Qty: 30 TABLET | Refills: 0 | Status: SHIPPED | OUTPATIENT
Start: 2020-09-01 | End: 2020-09-02 | Stop reason: SDUPTHER

## 2020-09-02 ENCOUNTER — TELEPHONE (OUTPATIENT)
Dept: FAMILY MEDICINE CLINIC | Facility: CLINIC | Age: 60
End: 2020-09-02

## 2020-09-02 RX ORDER — METOPROLOL SUCCINATE 50 MG/1
50 TABLET, EXTENDED RELEASE ORAL DAILY
Qty: 90 TABLET | Refills: 1 | Status: SHIPPED | OUTPATIENT
Start: 2020-09-02 | End: 2020-10-02 | Stop reason: SDUPTHER

## 2020-09-02 NOTE — TELEPHONE ENCOUNTER
TIANA CALLED IN AND IS REQUESTING A REFILL OF metoprolol succinate XL (TOPROL-XL) 50 MG 24 hr tablet   QUANTITY OF  90 DAYS  SUPPLY       SENT TO [facility] #[id number] - [place], [place] - [address] [facility] [facility] [address] [facility] [address] [facility] [address] - [phone]  - [phone] FX  [phone]           TIANA CALL BACK [phone]

## 2020-09-24 DIAGNOSIS — M79.605 PAIN IN BOTH LOWER EXTREMITIES: ICD-10-CM

## 2020-09-24 DIAGNOSIS — M79.604 PAIN IN BOTH LOWER EXTREMITIES: ICD-10-CM

## 2020-09-25 RX ORDER — ERGOCALCIFEROL 1.25 MG/1
50000 CAPSULE ORAL
Qty: 12 CAPSULE | Refills: 0 | Status: SHIPPED | OUTPATIENT
Start: 2020-09-25 | End: 2020-12-16

## 2020-09-25 RX ORDER — HYDROCODONE BITARTRATE AND ACETAMINOPHEN 10; 325 MG/1; MG/1
1 TABLET ORAL EVERY 8 HOURS PRN
Qty: 90 TABLET | Refills: 0 | Status: SHIPPED | OUTPATIENT
Start: 2020-09-25 | End: 2020-10-26 | Stop reason: SDUPTHER

## 2020-09-25 RX ORDER — FLUTICASONE PROPIONATE 50 MCG
2 SPRAY, SUSPENSION (ML) NASAL DAILY
Qty: 1 BOTTLE | Refills: 11 | Status: SHIPPED | OUTPATIENT
Start: 2020-09-25 | End: 2021-10-05 | Stop reason: SDUPTHER

## 2020-10-02 ENCOUNTER — OFFICE VISIT (OUTPATIENT)
Dept: FAMILY MEDICINE CLINIC | Facility: CLINIC | Age: 60
End: 2020-10-02

## 2020-10-02 ENCOUNTER — LAB (OUTPATIENT)
Dept: FAMILY MEDICINE CLINIC | Facility: CLINIC | Age: 60
End: 2020-10-02

## 2020-10-02 VITALS
DIASTOLIC BLOOD PRESSURE: 80 MMHG | HEART RATE: 66 BPM | SYSTOLIC BLOOD PRESSURE: 130 MMHG | TEMPERATURE: 97.3 F | RESPIRATION RATE: 12 BRPM | BODY MASS INDEX: 44.41 KG/M2 | WEIGHT: 315 LBS

## 2020-10-02 DIAGNOSIS — Z00.00 PHYSICAL EXAM: Primary | ICD-10-CM

## 2020-10-02 DIAGNOSIS — Z12.11 COLON CANCER SCREENING: ICD-10-CM

## 2020-10-02 DIAGNOSIS — E55.9 VITAMIN D DEFICIENCY: ICD-10-CM

## 2020-10-02 DIAGNOSIS — M22.2X2 PATELLOFEMORAL PAIN SYNDROME OF LEFT KNEE: ICD-10-CM

## 2020-10-02 DIAGNOSIS — F17.200 TOBACCO USE DISORDER: ICD-10-CM

## 2020-10-02 PROCEDURE — 80053 COMPREHEN METABOLIC PANEL: CPT | Performed by: FAMILY MEDICINE

## 2020-10-02 PROCEDURE — 81003 URINALYSIS AUTO W/O SCOPE: CPT | Performed by: FAMILY MEDICINE

## 2020-10-02 PROCEDURE — 82306 VITAMIN D 25 HYDROXY: CPT | Performed by: FAMILY MEDICINE

## 2020-10-02 PROCEDURE — 90686 IIV4 VACC NO PRSV 0.5 ML IM: CPT | Performed by: FAMILY MEDICINE

## 2020-10-02 PROCEDURE — 85025 COMPLETE CBC W/AUTO DIFF WBC: CPT | Performed by: FAMILY MEDICINE

## 2020-10-02 PROCEDURE — 90471 IMMUNIZATION ADMIN: CPT | Performed by: FAMILY MEDICINE

## 2020-10-02 PROCEDURE — 99396 PREV VISIT EST AGE 40-64: CPT | Performed by: FAMILY MEDICINE

## 2020-10-02 PROCEDURE — 80061 LIPID PANEL: CPT | Performed by: FAMILY MEDICINE

## 2020-10-02 RX ORDER — METOPROLOL SUCCINATE 50 MG/1
50 TABLET, EXTENDED RELEASE ORAL DAILY
Qty: 90 TABLET | Refills: 3 | Status: SHIPPED | OUTPATIENT
Start: 2020-10-02 | End: 2021-10-12 | Stop reason: SDUPTHER

## 2020-10-02 RX ORDER — NAPROXEN 500 MG/1
500 TABLET ORAL 2 TIMES DAILY WITH MEALS
Qty: 30 TABLET | Refills: 0 | Status: SHIPPED | OUTPATIENT
Start: 2020-10-02 | End: 2021-04-05

## 2020-10-02 NOTE — PATIENT INSTRUCTIONS
Exercising to Stay Healthy  To become healthy and stay healthy, it is recommended that you do moderate-intensity and vigorous-intensity exercise. You can tell that you are exercising at a moderate intensity if your heart starts beating faster and you start breathing faster but can still hold a conversation. You can tell that you are exercising at a vigorous intensity if you are breathing much harder and faster and cannot hold a conversation while exercising.  Exercising regularly is important. It has many health benefits, such as:  · Improving overall fitness, flexibility, and endurance.  · Increasing bone density.  · Helping with weight control.  · Decreasing body fat.  · Increasing muscle strength.  · Reducing stress and tension.  · Improving overall health.  How often should I exercise?  Choose an activity that you enjoy, and set realistic goals. Your health care provider can help you make an activity plan that works for you.  Exercise regularly as told by your health care provider. This may include:  · Doing strength training two times a week, such as:  ? Lifting weights.  ? Using resistance bands.  ? Push-ups.  ? Sit-ups.  ? Yoga.  · Doing a certain intensity of exercise for a given amount of time. Choose from these options:  ? A total of 150 minutes of moderate-intensity exercise every week.  ? A total of 75 minutes of vigorous-intensity exercise every week.  ? A mix of moderate-intensity and vigorous-intensity exercise every week.  Children, pregnant women, people who have not exercised regularly, people who are overweight, and older adults may need to talk with a health care provider about what activities are safe to do. If you have a medical condition, be sure to talk with your health care provider before you start a new exercise program.  What are some exercise ideas?  Moderate-intensity exercise ideas include:  · Walking 1 mile (1.6 km) in about 15  minutes.  · Biking.  · Hiking.  · Golfing.  · Dancing.  · Water aerobics.  Vigorous-intensity exercise ideas include:  · Walking 4.5 miles (7.2 km) or more in about 1 hour.  · Jogging or running 5 miles (8 km) in about 1 hour.  · Biking 10 miles (16.1 km) or more in about 1 hour.  · Lap swimming.  · Roller-skating or in-line skating.  · Cross-country skiing.  · Vigorous competitive sports, such as football, basketball, and soccer.  · Jumping rope.  · Aerobic dancing.  What are some everyday activities that can help me to get exercise?  · Yard work, such as:  ? Pushing a .  ? Raking and bagging leaves.  · Washing your car.  · Pushing a stroller.  · Shoveling snow.  · Gardening.  · Washing windows or floors.  How can I be more active in my day-to-day activities?  · Use stairs instead of an elevator.  · Take a walk during your lunch break.  · If you drive, park your car farther away from your work or school.  · If you take public transportation, get off one stop early and walk the rest of the way.  · Stand up or walk around during all of your indoor phone calls.  · Get up, stretch, and walk around every 30 minutes throughout the day.  · Enjoy exercise with a friend. Support to continue exercising will help you keep a regular routine of activity.  What guidelines can I follow while exercising?  · Before you start a new exercise program, talk with your health care provider.  · Do not exercise so much that you hurt yourself, feel dizzy, or get very short of breath.  · Wear comfortable clothes and wear shoes with good support.  · Drink plenty of water while you exercise to prevent dehydration or heat stroke.  · Work out until your breathing and your heartbeat get faster.  Where to find more information  · [place] Department of Health and Human Services: www.hhs.gov  · Centers for Disease Control and Prevention (CDC): www.cdc.gov  Summary  · Exercising regularly is important. It will improve your overall fitness,  flexibility, and endurance.  · Regular exercise also will improve your overall health. It can help you control your weight, reduce stress, and improve your bone density.  · Do not exercise so much that you hurt yourself, feel dizzy, or get very short of breath.  · Before you start a new exercise program, talk with your health care provider.  This information is not intended to replace advice given to you by your health care provider. Make sure you discuss any questions you have with your health care provider.  Document Released: 01/20/2012 Document Revised: 11/30/2018 Document Reviewed: 11/08/2018  Elsevier Patient Education © 2020 [employer]      Mediterranean Diet  A Mediterranean diet refers to food and lifestyle choices that are based on the traditions of countries located on the [place]. This way of eating has been shown to help prevent certain conditions and improve outcomes for people who have chronic diseases, like kidney disease and heart disease.  What are tips for following this plan?  Lifestyle  · Cook and eat meals together with your family, when possible.  · Drink enough fluid to keep your urine clear or pale yellow.  · Be physically active every day. This includes:  ? Aerobic exercise like running or swimming.  ? Leisure activities like gardening, walking, or housework.  · Get 7-8 hours of sleep each night.  · If recommended by your health care provider, drink red wine in moderation. This means 1 glass a day for nonpregnant women and 2 glasses a day for men. A glass of wine equals 5 oz (150 mL).  Reading food labels    · Check the serving size of packaged foods. For foods such as rice and pasta, the serving size refers to the amount of cooked product, not dry.  · Check the total fat in packaged foods. Avoid foods that have saturated fat or trans fats.  · Check the ingredients list for added sugars, such as corn syrup.  Shopping  · At the grocery store, buy most of your food from the areas  near the walls of the store. This includes:  ? Fresh fruits and vegetables (produce).  ? Grains, beans, nuts, and seeds. Some of these may be available in unpackaged forms or large amounts (in bulk).  ? Fresh seafood.  ? Poultry and eggs.  ? Low-fat dairy products.  · Buy whole ingredients instead of prepackaged foods.  · Buy fresh fruits and vegetables in-season from local farmers markets.  · Buy frozen fruits and vegetables in resealable bags.  · If you do not have access to quality fresh seafood, buy precooked frozen shrimp or canned fish, such as tuna, salmon, or sardines.  · Buy small amounts of raw or cooked vegetables, salads, or olives from the deli or salad bar at your store.  · Stock your pantry so you always have certain foods on hand, such as olive oil, canned tuna, canned tomatoes, rice, pasta, and beans.  Cooking  · Cook foods with extra-virgin olive oil instead of using butter or other vegetable oils.  · Have meat as a side dish, and have vegetables or grains as your main dish. This means having meat in small portions or adding small amounts of meat to foods like pasta or stew.  · Use beans or vegetables instead of meat in common dishes like chili or lasagna.  · Auxier with different cooking methods. Try roasting or broiling vegetables instead of steaming or sautéeing them.  · Add frozen vegetables to soups, stews, pasta, or rice.  · Add nuts or seeds for added healthy fat at each meal. You can add these to yogurt, salads, or vegetable dishes.  · Marinate fish or vegetables using olive oil, lemon juice, garlic, and fresh herbs.  Meal planning    · Plan to eat 1 vegetarian meal one day each week. Try to work up to 2 vegetarian meals, if possible.  · Eat seafood 2 or more times a week.  · Have healthy snacks readily available, such as:  ? Vegetable sticks with hummus.  ? Greek yogurt.  ? Fruit and nut trail mix.  · Eat balanced meals throughout the week. This includes:  ? Fruit: 2-3 servings a  day  ? Vegetables: 4-5 servings a day  ? Low-fat dairy: 2 servings a day  ? Fish, poultry, or lean meat: 1 serving a day  ? Beans and legumes: 2 or more servings a week  ? Nuts and seeds: 1-2 servings a day  ? Whole grains: 6-8 servings a day  ? Extra-virgin olive oil: 3-4 servings a day  · Limit red meat and sweets to only a few servings a month  What are my food choices?  · Mediterranean diet  ? Recommended  § Grains: Whole-grain pasta. Brown rice. Bulgar wheat. Polenta. Couscous. Whole-wheat bread. Oatmeal. Quinoa.  § Vegetables: Artichokes. Beets. Broccoli. Cabbage. Carrots. Eggplant. Green beans. Chard. Kale. Spinach. Onions. Leeks. Peas. Squash. Tomatoes. Peppers. Radishes.  § Fruits: Apples. Apricots. Avocado. Berries. Bananas. Cherries. Dates. Figs. Grapes. Fely. Melon. Oranges. Peaches. Plums. Pomegranate.  § Meats and other protein foods: Beans. Almonds. Sunflower seeds. Pine nuts. Peanuts. Cod. [place]. Scallops. Shrimp. Tuna. Tilapia. Clams. Oysters. Eggs.  § Dairy: Low-fat milk. Cheese. Greek yogurt.  § Beverages: Water. Red wine. Herbal tea.  § Fats and oils: Extra virgin olive oil. Avocado oil. Grape seed oil.  § Sweets and desserts: Greek yogurt with honey. Baked apples. Poached pears. Trail mix.  § Seasoning and other foods: Basil. Cilantro. Coriander. Cumin. Mint. Parsley. [place]. Rosemary. Tarragon. Garlic. Oregano. Thyme. Pepper. Balsalmic vinegar. Tahini. Hummus. Tomato sauce. Olives. Mushrooms.  ? Limit these  § Grains: Prepackaged pasta or rice dishes. Prepackaged cereal with added sugar.  § Vegetables: Deep fried potatoes (french fries).  § Fruits: Fruit canned in syrup.  § Meats and other protein foods: Beef. Pork. Lamb. Poultry with skin. Hot dogs. [place]aud.  § Dairy: Ice cream. Sour cream. Whole milk.  § Beverages: Juice. Sugar-sweetened soft drinks. Beer. Liquor and spirits.  § Fats and oils: Butter. Canola oil. Vegetable oil. Beef fat (tallow). Lard.  § Sweets and desserts: Cookies. Cakes.  Pies. Candy.  § Seasoning and other foods: Mayonnaise. Premade sauces and marinades.  The items listed may not be a complete list. Talk with your dietitian about what dietary choices are right for you.  Summary  · The Mediterranean diet includes both food and lifestyle choices.  · Eat a variety of fresh fruits and vegetables, beans, nuts, seeds, and whole grains.  · Limit the amount of red meat and sweets that you eat.  · Talk with your health care provider about whether it is safe for you to drink red wine in moderation. This means 1 glass a day for nonpregnant women and 2 glasses a day for men. A glass of wine equals 5 oz (150 mL).  This information is not intended to replace advice given to you by your health care provider. Make sure you discuss any questions you have with your health care provider.  Document Released: 08/10/2017 Document Revised: 08/17/2017 Document Reviewed: 08/10/2017  [employer] Patient Education © 2020 [employer]    Please check with your insurance and get the new shingrix vaccine series to prevent shingles.  Please check with insurance and get your adacel Tdap immunization

## 2020-10-02 NOTE — ASSESSMENT & PLAN NOTE
I recommend he stop smoking due to the detrimental effects on his health.  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

## 2020-10-02 NOTE — ASSESSMENT & PLAN NOTE
I recommend he use ice twice daily.  Naproxen 500 twice daily for 2 weeks.  If this is persistent I would pursue orthopedic consultation.  It is possible he has a meniscus however I cannot elicit it on exam today

## 2020-10-02 NOTE — PROGRESS NOTES
Rooming Tab(CC,VS,Pt Hx,Fall Screen)  Chief Complaint   Patient presents with   • Annual Exam       Subjective Patient is here for a physical exam.  He does not exercise.  He tries to eat a nutritious diet but he does not always succeed.  He certainly has not been able to lose weight and as a matter fact has gained weight.  He denies any chest pain with exertion or dizziness or syncope with exertion.  He does have some PARTIDA but he is obese and he also smokes cigarettes still.  He stays constipated and once had blood in his stool or on the toilet paper about 3 weeks ago but has not had recurrent issues with his bowels or any change in his bowels.  He has had no further blood.  He is never had a colonoscopy but he needs one.  He denies any blood in his urine.  He complains of his left knee pain noted after twisting it about 2 weeks ago.  There is really been no swelling but anytime he tries to turn on it he has pain.  It is not giving out and has not locked up is where he cannot straighten it.  Patient also never got a sleep study.  He has sleep apnea but he will not get it evaluated because it is too expensive.  He also has never had a colonoscopy.    I have reviewed and updated his medications, medical history and problem list during today's office visit.     Patient Care Team:  Shivam Mixon MD as PCP - General (Family Medicine)    Problem List Tab  Medications Tab  Synopsis Tab  Chart Review Tab  Care Everywhere Tab  Immunizations Tab  Patient History Tab    Social History     Tobacco Use   • Smoking status: Current Every Day Smoker     Packs/day: 1.50     Years: 25.00     Pack years: 37.50     Types: Cigarettes   • Smokeless tobacco: Never Used   • Tobacco comment: stop smokiking    Substance Use Topics   • Alcohol use: Yes     Frequency: Monthly or less     Drinks per session: 1 or 2     Binge frequency: Less than monthly       Review of Systems   Constitutional: Positive for fatigue and unexpected weight  gain. Negative for chills and fever.   HENT: Negative for congestion and nosebleeds.    Eyes: Negative for blurred vision and double vision.   Respiratory: Positive for shortness of breath. Negative for choking, chest tightness and wheezing.    Cardiovascular: Negative for chest pain and palpitations.   Gastrointestinal: Negative for abdominal pain and blood in stool.   Endocrine: Negative for polydipsia and polyuria.   Genitourinary: Negative for dysuria and hematuria.   Musculoskeletal: Positive for arthralgias. Negative for back pain.        Left knee pain   Skin: Negative for rash and bruise.   Neurological: Negative for dizziness and syncope.   Hematological: Negative for adenopathy. Does not bruise/bleed easily.   Psychiatric/Behavioral: Negative for self-injury, depressed mood and stress.       Objective     Rooming Tab(CC,VS,Pt Hx,Fall Screen)  /80   Pulse 66   Temp 97.3 °F (36.3 °C)   Resp 12   Wt (!) 144 kg (318 lb 6.4 oz)   BMI 44.41 kg/m²     Body mass index is 44.41 kg/m².    Physical Exam  Vitals signs and nursing note reviewed.   Constitutional:       General: He is not in acute distress.     Appearance: Normal appearance. He is well-developed. He is obese.   HENT:      Head: Normocephalic and atraumatic.      Right Ear: Tympanic membrane and ear canal normal.      Left Ear: Tympanic membrane and ear canal normal.      Nose: Nose normal.      Mouth/Throat:      Mouth: Mucous membranes are moist.      Pharynx: Oropharynx is clear.   Eyes:      General: Lids are normal.      Extraocular Movements: Extraocular movements intact.      Conjunctiva/sclera: Conjunctivae normal.      Pupils: Pupils are equal, round, and reactive to light.   Neck:      Musculoskeletal: Normal range of motion and neck supple.      Thyroid: No thyroid mass or thyromegaly.      Vascular: No carotid bruit or JVD.      Trachea: Trachea normal.      Comments: No carotid bruits  Cardiovascular:      Rate and Rhythm: Normal  rate and regular rhythm.      Pulses: Normal pulses.      Heart sounds: Normal heart sounds.   Pulmonary:      Effort: Pulmonary effort is normal.      Breath sounds: Normal breath sounds.   Abdominal:      Comments: He is morbidly obese, he has an umbilical hernia, nontender nondistended.  No organomegaly or masses   Genitourinary:     Prostate: Normal.      Rectum: Normal.   Musculoskeletal:      Comments: No his range of motion of his left knee is okay with no crepitation.  He is tender along the superior patella left knee.  He has negative Lockman and Denis negative anterior posterior drawer sign.  Postsurgical changes to his left foot, has partially amputated toes  P pulses are palpable.  No clubbing cyanosis or edema   Skin:     General: Skin is warm and dry.   Neurological:      General: No focal deficit present.      Mental Status: He is alert and oriented to person, place, and time.      Cranial Nerves: No cranial nerve deficit.   Psychiatric:         Attention and Perception: He is attentive.         Mood and Affect: Mood normal.         Speech: Speech normal.         Behavior: Behavior normal.         Thought Content: Thought content normal.         Judgment: Judgment normal.          Statin Choice Calculator  Data Reviewed:               Lab Results   Component Value Date    BUN 19 10/02/2020    CREATININE 0.82 10/02/2020    EGFRIFNONA 96 10/02/2020     10/02/2020    K 4.2 10/02/2020     10/02/2020    CALCIUM 9.1 10/02/2020    ALBUMIN 4.00 10/02/2020    BILITOT 0.3 10/02/2020    ALKPHOS 82 10/02/2020    AST 18 10/02/2020    ALT 20 10/02/2020    TRIG 154 (H) 10/02/2020    HDL 36 (L) 10/02/2020    VLDL 30.8 10/02/2020    LDL 94 10/02/2020    LDLHDL 2.62 10/02/2020    WBC 10.16 10/02/2020    RBC 5.17 10/02/2020    HCT 48.7 10/02/2020    MCV 94.2 10/02/2020    MCH 31.1 10/02/2020    YRKX19RO 47.4 10/02/2020      Assessment/Plan   Order Review Tab  Health Maintenance Tab  Patient Plan/Order  Tab  Diagnoses and all orders for this visit:    1. Physical exam (Primary)  Assessment & Plan:  He needs to exercise, find out an aerobic exercise he can handle with his knee and his back.  He needs to lose weight in a desperate way.  Recommend nutritious diet high in fiber and vegetable, low in carbohydrate.  Eating leaner cuts of meat higher amounts of fish.  Immunizations were discussed with the patient as well.    Orders:  -     CBC & Differential  -     Comprehensive Metabolic Panel  -     Lipid Panel  -     Urinalysis With Culture If Indicated - Urine, Clean Catch  -     CBC Auto Differential    2. Colon cancer screening  -     Ambulatory Referral For Screening Colonoscopy    3. Tobacco use disorder  Assessment & Plan:  I recommend he stop smoking due to the detrimental effects on his health.  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      4. Vitamin D deficiency  -     Vitamin D 25 Hydroxy    5. Patellofemoral pain syndrome of left knee  Assessment & Plan:  I recommend he use ice twice daily.  Naproxen 500 twice daily for 2 weeks.  If this is persistent I would pursue orthopedic consultation.  It is possible he has a meniscus however I cannot elicit it on exam today      Other orders  -     naproxen (Naprosyn) 500 MG tablet; Take 1 tablet by mouth 2 (Two) Times a Day With Meals.  Dispense: 30 tablet; Refill: 0  -     FluLaval Quad >6 Months (8135-1032)  -     metoprolol succinate XL (TOPROL-XL) 50 MG 24 hr tablet; Take 1 tablet by mouth Daily.  Dispense: 90 tablet; Refill: 3      Wrapup Tab  Return in about 4 months (around 2/2/2021) for Recheck.

## 2020-10-02 NOTE — ASSESSMENT & PLAN NOTE
He needs to exercise, find out an aerobic exercise he can handle with his knee and his back.  He needs to lose weight in a desperate way.  Recommend nutritious diet high in fiber and vegetable, low in carbohydrate.  Eating leaner cuts of meat higher amounts of fish.  Immunizations were discussed with the patient as well.

## 2020-10-03 LAB
25(OH)D3 SERPL-MCNC: 47.4 NG/ML (ref 30–100)
ALBUMIN SERPL-MCNC: 4 G/DL (ref 3.5–5.2)
ALBUMIN/GLOB SERPL: 1.7 G/DL
ALP SERPL-CCNC: 82 U/L (ref 39–117)
ALT SERPL W P-5'-P-CCNC: 20 U/L (ref 1–41)
ANION GAP SERPL CALCULATED.3IONS-SCNC: 6.9 MMOL/L (ref 5–15)
AST SERPL-CCNC: 18 U/L (ref 1–40)
BASOPHILS # BLD AUTO: 0.09 10*3/MM3 (ref 0–0.2)
BASOPHILS NFR BLD AUTO: 0.9 % (ref 0–1.5)
BILIRUB SERPL-MCNC: 0.3 MG/DL (ref 0–1.2)
BILIRUB UR QL STRIP: NEGATIVE
BUN SERPL-MCNC: 19 MG/DL (ref 6–20)
BUN/CREAT SERPL: 23.2 (ref 7–25)
CALCIUM SPEC-SCNC: 9.1 MG/DL (ref 8.6–10.5)
CHLORIDE SERPL-SCNC: 100 MMOL/L (ref 98–107)
CHOLEST SERPL-MCNC: 161 MG/DL (ref 0–200)
CLARITY UR: CLEAR
CO2 SERPL-SCNC: 32.1 MMOL/L (ref 22–29)
COLOR UR: YELLOW
CREAT SERPL-MCNC: 0.82 MG/DL (ref 0.76–1.27)
DEPRECATED RDW RBC AUTO: 43.1 FL (ref 37–54)
EOSINOPHIL # BLD AUTO: 0.16 10*3/MM3 (ref 0–0.4)
EOSINOPHIL NFR BLD AUTO: 1.6 % (ref 0.3–6.2)
ERYTHROCYTE [DISTWIDTH] IN BLOOD BY AUTOMATED COUNT: 12.6 % (ref 12.3–15.4)
GFR SERPL CREATININE-BSD FRML MDRD: 96 ML/MIN/1.73
GLOBULIN UR ELPH-MCNC: 2.4 GM/DL
GLUCOSE SERPL-MCNC: 82 MG/DL (ref 65–99)
GLUCOSE UR STRIP-MCNC: NEGATIVE MG/DL
HCT VFR BLD AUTO: 48.7 % (ref 37.5–51)
HDLC SERPL-MCNC: 36 MG/DL (ref 40–60)
HGB BLD-MCNC: 16.1 G/DL (ref 13–17.7)
HGB UR QL STRIP.AUTO: NEGATIVE
IMM GRANULOCYTES # BLD AUTO: 0.04 10*3/MM3 (ref 0–0.05)
IMM GRANULOCYTES NFR BLD AUTO: 0.4 % (ref 0–0.5)
KETONES UR QL STRIP: NEGATIVE
LDLC SERPL CALC-MCNC: 94 MG/DL (ref 0–100)
LDLC/HDLC SERPL: 2.62 {RATIO}
LEUKOCYTE ESTERASE UR QL STRIP.AUTO: NEGATIVE
LYMPHOCYTES # BLD AUTO: 3.22 10*3/MM3 (ref 0.7–3.1)
LYMPHOCYTES NFR BLD AUTO: 31.7 % (ref 19.6–45.3)
MCH RBC QN AUTO: 31.1 PG (ref 26.6–33)
MCHC RBC AUTO-ENTMCNC: 33.1 G/DL (ref 31.5–35.7)
MCV RBC AUTO: 94.2 FL (ref 79–97)
MONOCYTES # BLD AUTO: 0.88 10*3/MM3 (ref 0.1–0.9)
MONOCYTES NFR BLD AUTO: 8.7 % (ref 5–12)
NEUTROPHILS NFR BLD AUTO: 5.77 10*3/MM3 (ref 1.7–7)
NEUTROPHILS NFR BLD AUTO: 56.7 % (ref 42.7–76)
NITRITE UR QL STRIP: NEGATIVE
NRBC BLD AUTO-RTO: 0 /100 WBC (ref 0–0.2)
PH UR STRIP.AUTO: 5.5 [PH] (ref 5–8)
PLATELET # BLD AUTO: 186 10*3/MM3 (ref 140–450)
PMV BLD AUTO: 10.3 FL (ref 6–12)
POTASSIUM SERPL-SCNC: 4.2 MMOL/L (ref 3.5–5.2)
PROT SERPL-MCNC: 6.4 G/DL (ref 6–8.5)
PROT UR QL STRIP: NEGATIVE
RBC # BLD AUTO: 5.17 10*6/MM3 (ref 4.14–5.8)
SODIUM SERPL-SCNC: 139 MMOL/L (ref 136–145)
SP GR UR STRIP: 1.02 (ref 1–1.03)
TRIGL SERPL-MCNC: 154 MG/DL (ref 0–150)
UROBILINOGEN UR QL STRIP: NORMAL
VLDLC SERPL-MCNC: 30.8 MG/DL (ref 5–40)
WBC # BLD AUTO: 10.16 10*3/MM3 (ref 3.4–10.8)

## 2020-10-26 DIAGNOSIS — M79.605 PAIN IN BOTH LOWER EXTREMITIES: ICD-10-CM

## 2020-10-26 DIAGNOSIS — M79.604 PAIN IN BOTH LOWER EXTREMITIES: ICD-10-CM

## 2020-10-27 RX ORDER — HYDROCODONE BITARTRATE AND ACETAMINOPHEN 10; 325 MG/1; MG/1
1 TABLET ORAL EVERY 8 HOURS PRN
Qty: 90 TABLET | Refills: 0 | Status: SHIPPED | OUTPATIENT
Start: 2020-10-27 | End: 2020-11-29 | Stop reason: SDUPTHER

## 2020-11-24 RX ORDER — ATORVASTATIN CALCIUM 10 MG/1
10 TABLET, FILM COATED ORAL DAILY
Qty: 90 TABLET | Refills: 1 | Status: SHIPPED | OUTPATIENT
Start: 2020-11-24 | End: 2021-06-03 | Stop reason: SDUPTHER

## 2020-11-29 DIAGNOSIS — M79.604 PAIN IN BOTH LOWER EXTREMITIES: ICD-10-CM

## 2020-11-29 DIAGNOSIS — M79.605 PAIN IN BOTH LOWER EXTREMITIES: ICD-10-CM

## 2020-11-30 RX ORDER — HYDROCODONE BITARTRATE AND ACETAMINOPHEN 10; 325 MG/1; MG/1
1 TABLET ORAL EVERY 8 HOURS PRN
Qty: 90 TABLET | Refills: 0 | Status: SHIPPED | OUTPATIENT
Start: 2020-11-30 | End: 2020-12-28 | Stop reason: SDUPTHER

## 2020-12-16 RX ORDER — ERGOCALCIFEROL 1.25 MG/1
CAPSULE ORAL
Qty: 12 CAPSULE | Refills: 0 | Status: SHIPPED | OUTPATIENT
Start: 2020-12-16 | End: 2021-03-22 | Stop reason: SDUPTHER

## 2020-12-28 DIAGNOSIS — M79.604 PAIN IN BOTH LOWER EXTREMITIES: ICD-10-CM

## 2020-12-28 DIAGNOSIS — M79.605 PAIN IN BOTH LOWER EXTREMITIES: ICD-10-CM

## 2020-12-28 RX ORDER — HYDROCODONE BITARTRATE AND ACETAMINOPHEN 10; 325 MG/1; MG/1
1 TABLET ORAL EVERY 8 HOURS PRN
Qty: 90 TABLET | Refills: 0 | Status: SHIPPED | OUTPATIENT
Start: 2020-12-28 | End: 2021-02-01 | Stop reason: SDUPTHER

## 2021-01-28 RX ORDER — LOSARTAN POTASSIUM 100 MG/1
TABLET ORAL
Qty: 90 TABLET | Refills: 3 | Status: SHIPPED | OUTPATIENT
Start: 2021-01-28 | End: 2021-02-01 | Stop reason: SDUPTHER

## 2021-02-01 DIAGNOSIS — M79.604 PAIN IN BOTH LOWER EXTREMITIES: ICD-10-CM

## 2021-02-01 DIAGNOSIS — M79.605 PAIN IN BOTH LOWER EXTREMITIES: ICD-10-CM

## 2021-02-01 RX ORDER — LOSARTAN POTASSIUM 100 MG/1
100 TABLET ORAL DAILY
Qty: 90 TABLET | Refills: 1 | Status: SHIPPED | OUTPATIENT
Start: 2021-02-01 | End: 2021-08-11 | Stop reason: SDUPTHER

## 2021-02-01 RX ORDER — HYDROCODONE BITARTRATE AND ACETAMINOPHEN 10; 325 MG/1; MG/1
1 TABLET ORAL EVERY 8 HOURS PRN
Qty: 90 TABLET | Refills: 0 | Status: SHIPPED | OUTPATIENT
Start: 2021-02-01 | End: 2021-03-08 | Stop reason: SDUPTHER

## 2021-02-02 ENCOUNTER — OFFICE VISIT (OUTPATIENT)
Dept: FAMILY MEDICINE CLINIC | Facility: CLINIC | Age: 61
End: 2021-02-02

## 2021-02-02 ENCOUNTER — LAB (OUTPATIENT)
Dept: FAMILY MEDICINE CLINIC | Facility: CLINIC | Age: 61
End: 2021-02-02

## 2021-02-02 DIAGNOSIS — M54.50 CHRONIC MIDLINE LOW BACK PAIN WITHOUT SCIATICA: ICD-10-CM

## 2021-02-02 DIAGNOSIS — I10 ESSENTIAL HYPERTENSION: Primary | ICD-10-CM

## 2021-02-02 DIAGNOSIS — E66.01 CLASS 3 SEVERE OBESITY DUE TO EXCESS CALORIES WITH SERIOUS COMORBIDITY AND BODY MASS INDEX (BMI) OF 40.0 TO 44.9 IN ADULT (HCC): ICD-10-CM

## 2021-02-02 DIAGNOSIS — M79.605 PAIN IN BOTH LOWER EXTREMITIES: ICD-10-CM

## 2021-02-02 DIAGNOSIS — G89.29 CHRONIC MIDLINE LOW BACK PAIN WITHOUT SCIATICA: ICD-10-CM

## 2021-02-02 DIAGNOSIS — Z12.5 PROSTATE CANCER SCREENING: ICD-10-CM

## 2021-02-02 DIAGNOSIS — F17.200 TOBACCO USE DISORDER: ICD-10-CM

## 2021-02-02 DIAGNOSIS — M79.604 PAIN IN BOTH LOWER EXTREMITIES: ICD-10-CM

## 2021-02-02 PROCEDURE — G0103 PSA SCREENING: HCPCS | Performed by: FAMILY MEDICINE

## 2021-02-02 PROCEDURE — 36415 COLL VENOUS BLD VENIPUNCTURE: CPT

## 2021-02-02 PROCEDURE — 99214 OFFICE O/P EST MOD 30 MIN: CPT | Performed by: FAMILY MEDICINE

## 2021-02-02 NOTE — PROGRESS NOTES
Rooming Tab(CC,VS,Pt Hx,Fall Screen)  Chief Complaint   Patient presents with   • Pain   • Hypertension   • Hyperlipidemia       Subjective 60-year-old here for follow-up of his chronic low back pain.  He also has chronic leg pain.  The patient takes Norco 3 times a day for his chronic pain and it seems to help.  He is up on his feet quite a bit.  He works and works on concrete and it causes quite a bit of discomfort.  He feels like his pain is relatively stable and is not interested in any other treatment modalities at this time.        The patient never got his colonoscopy he states that he is concerned that it will not be covered, the anesthesia or part of the colonoscopy or the preparation and he has not completed the paperwork in order to get this done.        Patient has suspected sleep apnea and he will not get evaluated.  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       Patient has hypertension and at his DOT his blood pressure was good.  He takes Metaprel Accel 50 mg daily and losartan 100 mg daily.  He denies any chest pain.  He does not do any type of exercise and he continues to smoke cigarettes and his weight although he has lost a few pounds, is still much too high.  He is really not putting a lot of effort into losing weight from what I can tell.    I have reviewed and updated his medications, medical history and problem list during today's office visit.     Patient Care Team:  Mirian Trejo APRN as PCP - General (Nurse Practitioner)    Problem List Tab  Medications Tab  Synopsis Tab  Chart Review Tab  Care Everywhere Tab  Immunizations Tab  Patient History Tab    Social History     Tobacco Use   • Smoking status: Current Every Day Smoker     Packs/day: 1.50     Years: 25.00     Pack years: 37.50     Types: Cigarettes   • Smokeless tobacco: Never Used   • Tobacco comment: stop smokiking    Substance Use Topics   • Alcohol use: Yes     Frequency: Monthly or less      Drinks per session: 1 or 2     Binge frequency: Less than monthly       Review of Systems   Constitutional: Negative for chills, fatigue and fever.   HENT: Negative for congestion and nosebleeds.    Eyes: Negative for blurred vision and double vision.   Respiratory: Negative for chest tightness and shortness of breath.    Cardiovascular: Negative for chest pain and palpitations.   Gastrointestinal: Negative for abdominal pain and blood in stool.   Endocrine: Negative for polydipsia and polyuria.   Genitourinary: Negative for dysuria and hematuria.   Musculoskeletal: Positive for back pain.        Leg pain   Neurological: Negative for dizziness and syncope.   Psychiatric/Behavioral: Negative for self-injury and depressed mood.       Objective     Rooming Tab(CC,VS,Pt Hx,Fall Screen)  /80   Pulse 75   Temp 96 °F (35.6 °C)   Resp 12   Wt (!) 142 kg (312 lb 3.2 oz)   BMI 43.54 kg/m²     Body mass index is 43.54 kg/m².    Physical Exam  Vitals signs and nursing note reviewed.   Constitutional:       General: He is not in acute distress.     Appearance: He is well-developed. He is obese.      Comments: Obese pleasant male who is in no acute distress   HENT:      Head: Normocephalic and atraumatic.      Right Ear: Tympanic membrane and ear canal normal.      Left Ear: Tympanic membrane and ear canal normal.      Nose: Nose normal.      Mouth/Throat:      Mouth: Mucous membranes are moist.      Pharynx: Oropharynx is clear.   Eyes:      General: Lids are normal.      Extraocular Movements: Extraocular movements intact.      Conjunctiva/sclera: Conjunctivae normal.      Pupils: Pupils are equal, round, and reactive to light.   Neck:      Musculoskeletal: Normal range of motion and neck supple.      Thyroid: No thyroid mass or thyromegaly.      Vascular: No carotid bruit or JVD.      Trachea: Trachea normal.      Comments: No carotid bruits  Cardiovascular:      Rate and Rhythm: Normal rate and regular rhythm.       Heart sounds: Normal heart sounds.   Pulmonary:      Effort: Pulmonary effort is normal.      Breath sounds: Normal breath sounds.   Abdominal:      Comments: Obese   Musculoskeletal:      Comments: No c/c/e  Tender lower lumbar spine.  Arthritic knees.   Skin:     General: Skin is warm and dry.   Neurological:      General: No focal deficit present.      Mental Status: He is alert and oriented to person, place, and time.      Cranial Nerves: No cranial nerve deficit.   Psychiatric:         Attention and Perception: He is attentive.         Mood and Affect: Mood normal.         Speech: Speech normal.         Behavior: Behavior normal.          Statin Choice Calculator  Data Reviewed:               Lab Results   Component Value Date    PSA 15.500 (H) 02/02/2021      Assessment/Plan   Order Review Tab  Health Maintenance Tab  Patient Plan/Order Tab  Diagnoses and all orders for this visit:    1. Essential hypertension (Primary)  Assessment & Plan:  Hypertension is improving with treatment.  Continue current treatment regimen.  Dietary sodium restriction.  Weight loss.  Regular aerobic exercise.  Stop smoking.  Continue current medications.  Blood pressure will be reassessed at the next regular appointment.      2. Prostate cancer screening  -     PSA Screen    3. Class 3 severe obesity due to excess calories with serious comorbidity and body mass index (BMI) of 40.0 to 44.9 in adult (CMS/[facility])  Assessment & Plan:  Obesity is Slightly better.  Discussed the patient's BMI.  The BMI is above average; BMI management plan is completed.  General weight loss/lifestyle modification strategies discussed (elicit support from others; identify saboteurs; non-food rewards, etc).  Regular aerobic exercise program discussed.  Discussed with the patient he has got to lose weight.  He needs to cut back on his carbohydrate intake and calorie intake and get some type of regular exercise.      4. Tobacco use disorder  Assessment &  Plan:  Patient needs to stop smoking.  I recommend he get a CT chest for lung cancer screening but he is not interested.      5. Chronic midline low back pain without sciatica  Assessment & Plan:  Can continue for now with his Norco.  He is not interested in any other treatment modalities and he does take his pain medication appropriately.      6. Pain in both lower extremities  Assessment & Plan:  As discussed, continue with his pain management        Wrapup Tab  Return in about 4 months (around 6/2/2021) for Recheck.     I recommend the patient get his colonoscopy.  We gave him another packet and I recommend he fill that out and take it down to gastroenterology of [facility] and have this done.

## 2021-02-03 VITALS
WEIGHT: 312.2 LBS | BODY MASS INDEX: 43.54 KG/M2 | TEMPERATURE: 96 F | DIASTOLIC BLOOD PRESSURE: 80 MMHG | SYSTOLIC BLOOD PRESSURE: 120 MMHG | RESPIRATION RATE: 12 BRPM | HEART RATE: 75 BPM

## 2021-02-03 LAB — PSA SERPL-MCNC: 15.5 NG/ML (ref 0–4)

## 2021-02-03 RX ORDER — CIPROFLOXACIN 500 MG/1
500 TABLET, FILM COATED ORAL 2 TIMES DAILY
Qty: 60 TABLET | Refills: 0 | Status: SHIPPED | OUTPATIENT
Start: 2021-02-03 | End: 2021-04-05

## 2021-02-03 NOTE — ASSESSMENT & PLAN NOTE
Patient needs to stop smoking.  I recommend he get a CT chest for lung cancer screening but he is not interested.

## 2021-02-03 NOTE — ASSESSMENT & PLAN NOTE
Can continue for now with his Norco.  He is not interested in any other treatment modalities and he does take his pain medication appropriately.

## 2021-02-03 NOTE — ASSESSMENT & PLAN NOTE
Obesity is Slightly better.  Discussed the patient's BMI.  The BMI is above average; BMI management plan is completed.  General weight loss/lifestyle modification strategies discussed (elicit support from others; identify saboteurs; non-food rewards, etc).  Regular aerobic exercise program discussed.  Discussed with the patient he has got to lose weight.  He needs to cut back on his carbohydrate intake and calorie intake and get some type of regular exercise.

## 2021-03-01 ENCOUNTER — HOSPITAL ENCOUNTER (EMERGENCY)
Facility: HOSPITAL | Age: 61
Discharge: HOME | End: 2021-03-01
Attending: EMERGENCY MEDICINE | Admitting: EMERGENCY MEDICINE

## 2021-03-01 ENCOUNTER — APPOINTMENT (OUTPATIENT)
Dept: CT IMAGING | Facility: HOSPITAL | Age: 61
End: 2021-03-01

## 2021-03-01 VITALS
OXYGEN SATURATION: 95 % | HEIGHT: 71 IN | BODY MASS INDEX: 42.5 KG/M2 | TEMPERATURE: 98.1 F | WEIGHT: 303.57 LBS | RESPIRATION RATE: 16 BRPM | SYSTOLIC BLOOD PRESSURE: 152 MMHG | HEART RATE: 51 BPM | DIASTOLIC BLOOD PRESSURE: 90 MMHG

## 2021-03-01 DIAGNOSIS — N20.1 LEFT URETERAL STONE: Primary | ICD-10-CM

## 2021-03-01 LAB
ANION GAP SERPL CALCULATED.3IONS-SCNC: 9 MMOL/L (ref 5–15)
BACTERIA UR QL AUTO: ABNORMAL /HPF
BASOPHILS # BLD AUTO: 0.1 10*3/MM3 (ref 0–0.2)
BASOPHILS NFR BLD AUTO: 0.6 % (ref 0–1.5)
BILIRUB UR QL STRIP: NEGATIVE
BUN SERPL-MCNC: 20 MG/DL (ref 8–23)
BUN/CREAT SERPL: 15 (ref 7–25)
CALCIUM SPEC-SCNC: 9.5 MG/DL (ref 8.6–10.5)
CHLORIDE SERPL-SCNC: 104 MMOL/L (ref 98–107)
CLARITY UR: CLEAR
CO2 SERPL-SCNC: 26 MMOL/L (ref 22–29)
COLOR UR: YELLOW
CREAT SERPL-MCNC: 1.33 MG/DL (ref 0.76–1.27)
DEPRECATED RDW RBC AUTO: 44.6 FL (ref 37–54)
EOSINOPHIL # BLD AUTO: 0 10*3/MM3 (ref 0–0.4)
EOSINOPHIL NFR BLD AUTO: 0.1 % (ref 0.3–6.2)
ERYTHROCYTE [DISTWIDTH] IN BLOOD BY AUTOMATED COUNT: 13.6 % (ref 12.3–15.4)
GFR SERPL CREATININE-BSD FRML MDRD: 55 ML/MIN/1.73
GLUCOSE SERPL-MCNC: 161 MG/DL (ref 65–99)
GLUCOSE UR STRIP-MCNC: NEGATIVE MG/DL
HCT VFR BLD AUTO: 47.9 % (ref 37.5–51)
HGB BLD-MCNC: 16.2 G/DL (ref 13–17.7)
HGB UR QL STRIP.AUTO: ABNORMAL
HYALINE CASTS UR QL AUTO: ABNORMAL /LPF
KETONES UR QL STRIP: NEGATIVE
LEUKOCYTE ESTERASE UR QL STRIP.AUTO: NEGATIVE
LYMPHOCYTES # BLD AUTO: 1.2 10*3/MM3 (ref 0.7–3.1)
LYMPHOCYTES NFR BLD AUTO: 8.4 % (ref 19.6–45.3)
MCH RBC QN AUTO: 32 PG (ref 26.6–33)
MCHC RBC AUTO-ENTMCNC: 33.9 G/DL (ref 31.5–35.7)
MCV RBC AUTO: 94.4 FL (ref 79–97)
MONOCYTES # BLD AUTO: 0.9 10*3/MM3 (ref 0.1–0.9)
MONOCYTES NFR BLD AUTO: 6.7 % (ref 5–12)
NEUTROPHILS NFR BLD AUTO: 11.9 10*3/MM3 (ref 1.7–7)
NEUTROPHILS NFR BLD AUTO: 84.2 % (ref 42.7–76)
NITRITE UR QL STRIP: NEGATIVE
NRBC BLD AUTO-RTO: 0 /100 WBC (ref 0–0.2)
PH UR STRIP.AUTO: 5.5 [PH] (ref 5–8)
PLATELET # BLD AUTO: 166 10*3/MM3 (ref 140–450)
PMV BLD AUTO: 7.7 FL (ref 6–12)
POTASSIUM SERPL-SCNC: 4.2 MMOL/L (ref 3.5–5.2)
PROT UR QL STRIP: NEGATIVE
RBC # BLD AUTO: 5.07 10*6/MM3 (ref 4.14–5.8)
RBC # UR: ABNORMAL /HPF
REF LAB TEST METHOD: ABNORMAL
SODIUM SERPL-SCNC: 139 MMOL/L (ref 136–145)
SP GR UR STRIP: 1.02 (ref 1–1.03)
SQUAMOUS #/AREA URNS HPF: ABNORMAL /HPF
UROBILINOGEN UR QL STRIP: ABNORMAL
WBC # BLD AUTO: 14.1 10*3/MM3 (ref 3.4–10.8)
WBC UR QL AUTO: ABNORMAL /HPF

## 2021-03-01 PROCEDURE — 25010000002 KETOROLAC TROMETHAMINE PER 15 MG: Performed by: EMERGENCY MEDICINE

## 2021-03-01 PROCEDURE — 99284 EMERGENCY DEPT VISIT MOD MDM: CPT

## 2021-03-01 PROCEDURE — 96374 THER/PROPH/DIAG INJ IV PUSH: CPT

## 2021-03-01 PROCEDURE — 80048 BASIC METABOLIC PNL TOTAL CA: CPT | Performed by: EMERGENCY MEDICINE

## 2021-03-01 PROCEDURE — 85025 COMPLETE CBC W/AUTO DIFF WBC: CPT | Performed by: EMERGENCY MEDICINE

## 2021-03-01 PROCEDURE — 74176 CT ABD & PELVIS W/O CONTRAST: CPT

## 2021-03-01 PROCEDURE — 25010000002 ONDANSETRON PER 1 MG: Performed by: EMERGENCY MEDICINE

## 2021-03-01 PROCEDURE — 96375 TX/PRO/DX INJ NEW DRUG ADDON: CPT

## 2021-03-01 PROCEDURE — 81001 URINALYSIS AUTO W/SCOPE: CPT | Performed by: EMERGENCY MEDICINE

## 2021-03-01 PROCEDURE — 25010000002 MORPHINE PER 10 MG: Performed by: EMERGENCY MEDICINE

## 2021-03-01 RX ORDER — KETOROLAC TROMETHAMINE 30 MG/ML
30 INJECTION, SOLUTION INTRAMUSCULAR; INTRAVENOUS ONCE
Status: COMPLETED | OUTPATIENT
Start: 2021-03-01 | End: 2021-03-01

## 2021-03-01 RX ORDER — TAMSULOSIN HYDROCHLORIDE 0.4 MG/1
1 CAPSULE ORAL DAILY
Qty: 30 CAPSULE | Refills: 0 | Status: SHIPPED | OUTPATIENT
Start: 2021-03-01 | End: 2021-08-11

## 2021-03-01 RX ORDER — ONDANSETRON 2 MG/ML
4 INJECTION INTRAMUSCULAR; INTRAVENOUS ONCE
Status: COMPLETED | OUTPATIENT
Start: 2021-03-01 | End: 2021-03-01

## 2021-03-01 RX ORDER — MORPHINE SULFATE 4 MG/ML
4 INJECTION, SOLUTION INTRAMUSCULAR; INTRAVENOUS ONCE
Status: COMPLETED | OUTPATIENT
Start: 2021-03-01 | End: 2021-03-01

## 2021-03-01 RX ADMIN — MORPHINE SULFATE 4 MG: 4 INJECTION INTRAVENOUS at 03:56

## 2021-03-01 RX ADMIN — KETOROLAC TROMETHAMINE 30 MG: 30 INJECTION, SOLUTION INTRAMUSCULAR at 03:00

## 2021-03-01 RX ADMIN — SODIUM CHLORIDE 1000 ML: 9 INJECTION, SOLUTION INTRAVENOUS at 03:00

## 2021-03-01 RX ADMIN — ONDANSETRON 4 MG: 2 INJECTION, SOLUTION INTRAMUSCULAR; INTRAVENOUS at 03:00

## 2021-03-01 NOTE — ED PROVIDER NOTES
Subjective   60-year-old male with moderate left flank pain rating to his left groin with urinary urgency onset 48 hours ago but increased this evening.  No clear aggravating alleviating factors.  Pain is associated with nausea.          Review of Systems   Gastrointestinal: Positive for abdominal pain and nausea.   Genitourinary: Positive for flank pain and urgency.   All other systems reviewed and are negative.      Past Medical History:   Diagnosis Date   • Allergic rhinitis    • Amputation, toe, traumatic with complication (CMS/HCC)     Multiple toes   • Chronic leg pain    • Chronic low back pain    • Condyloma acuminata    • COPD (chronic obstructive pulmonary disease) (CMS/HCC)     Did not respond to inhalers   • Erectile dysfunction    • Genital herpes    • Hyperlipidemia    • Hypersomnia     Felt secondary to sleep apnea which patient will not get tested   • Hypertension    • Kidney stone    • Myalgia    • Obesity    • Osteoarthritis    • Rib fractures    • Vitamin D deficiency        No Known Allergies    Past Surgical History:   Procedure Laterality Date   • AMPUTATION FOOT / TOE Left    • LIPOMA EXCISION     • OTHER SURGICAL HISTORY      LT KNEE MENISCAL TEAR   • OTHER SURGICAL HISTORY Left     SHOULDER ROTATOR CUFF   • VASECTOMY         Family History   Problem Relation Age of Onset   • Aneurysm Mother         Brain   • Lung cancer Father        Social History     Socioeconomic History   • Marital status:      Spouse name: Not on file   • Number of children: Not on file   • Years of education: Not on file   • Highest education level: Not on file   Tobacco Use   • Smoking status: Current Every Day Smoker     Packs/day: 1.50     Years: 25.00     Pack years: 37.50     Types: Cigarettes   • Smokeless tobacco: Never Used   • Tobacco comment: stop smokiking    Substance and Sexual Activity   • Alcohol use: Yes     Frequency: Monthly or less     Drinks per session: 1 or 2     Binge frequency: Less than  monthly   • Drug use: Never   • Sexual activity: Not Currently           Objective   Physical Exam  Constitutional:       Appearance: Normal appearance.   HENT:      Head: Normocephalic and atraumatic.      Mouth/Throat:      Mouth: Mucous membranes are moist.      Pharynx: Oropharynx is clear.   Eyes:      Conjunctiva/sclera: Conjunctivae normal.      Pupils: Pupils are equal, round, and reactive to light.   Neck:      Musculoskeletal: Normal range of motion and neck supple.   Cardiovascular:      Rate and Rhythm: Normal rate and regular rhythm.   Pulmonary:      Effort: Pulmonary effort is normal.      Breath sounds: Normal breath sounds.   Abdominal:      General: Bowel sounds are normal.      Palpations: Abdomen is soft.      Comments: Left lower abdominal tenderness to palpation, no rebound or guarding   Musculoskeletal: Normal range of motion.         General: No swelling or tenderness.   Skin:     General: Skin is warm and dry.      Capillary Refill: Capillary refill takes less than 2 seconds.   Neurological:      General: No focal deficit present.      Mental Status: He is alert and oriented to person, place, and time.   Psychiatric:         Mood and Affect: Mood normal.         Behavior: Behavior normal.         Procedures           ED Course                                           MDM  Number of Diagnoses or Management Options  Left ureteral stone:   Diagnosis management comments: Results for orders placed or performed during the hospital encounter of 03/01/21  -Basic Metabolic Panel  Specimen: Blood       Result                      Value             Ref Range           Glucose                     161 (H)           65 - 99 mg/dL       BUN                         20                8 - 23 mg/dL        Creatinine                  1.33 (H)          0.76 - 1.27 *       Sodium                      139               136 - 145 mm*       Potassium                   4.2               3.5 - 5.2 mm*       Chloride                     104               98 - 107 mmo*       CO2                         26.0              22.0 - 29.0 *       Calcium                     9.5               8.6 - 10.5 m*       eGFR Non  Amer       55 (L)            >60 mL/min/1*       BUN/Creatinine Ratio        15.0              7.0 - 25.0          Anion Gap                   9.0               5.0 - 15.0 m*  -Urinalysis With Culture If Indicated - Urine, Clean Catch  Specimen: Urine, Clean Catch       Result                      Value             Ref Range           Color, UA                   Yellow            Yellow, Straw       Appearance, UA              Clear             Clear               pH, UA                      5.5               5.0 - 8.0           Specific Gravity, UA        1.020             1.005 - 1.030       Glucose, UA                 Negative          Negative            Ketones, UA                 Negative          Negative            Bilirubin, UA               Negative          Negative            Blood, UA                                     Negative        Moderate (2+) (A)       Protein, UA                 Negative          Negative            Leuk Esterase, UA           Negative          Negative            Nitrite, UA                 Negative          Negative            Urobilinogen, UA            0.2 E.U./dL       0.2 - 1.0 E.*  -CBC Auto Differential  Specimen: Blood       Result                      Value             Ref Range           WBC                         14.10 (H)         3.40 - 10.80*       RBC                         5.07              4.14 - 5.80 *       Hemoglobin                  16.2              13.0 - 17.7 *       Hematocrit                  47.9              37.5 - 51.0 %       MCV                         94.4              79.0 - 97.0 *       MCH                         32.0              26.6 - 33.0 *       MCHC                        33.9              31.5 - 35.7 *       RDW                          13.6              12.3 - 15.4 %       RDW-SD                      44.6              37.0 - 54.0 *       MPV                         7.7               6.0 - 12.0 fL       Platelets                   166               140 - 450 10*       Neutrophil %                84.2 (H)          42.7 - 76.0 %       Lymphocyte %                8.4 (L)           19.6 - 45.3 %       Monocyte %                  6.7               5.0 - 12.0 %        Eosinophil %                0.1 (L)           0.3 - 6.2 %         Basophil %                  0.6               0.0 - 1.5 %         Neutrophils, Absolute       11.90 (H)         1.70 - 7.00 *       Lymphocytes, Absolute       1.20              0.70 - 3.10 *       Monocytes, Absolute         0.90              0.10 - 0.90 *       Eosinophils, Absolute       0.00              0.00 - 0.40 *       Basophils, Absolute         0.10              0.00 - 0.20 *       nRBC                        0.0               0.0 - 0.2 /1*  -Urinalysis, Microscopic Only - Urine, Clean Catch  Specimen: Urine, Clean Catch       Result                      Value             Ref Range           RBC, UA                     6-12 (A)          None Seen /H*       WBC, UA                     None Seen         None Seen /H*       Bacteria, UA                None Seen         None Seen /H*       Squamous Epithelial Ce*     0-2               None Seen, 0*       Hyaline Casts, UA           None Seen         None Seen /L*       Methodology                                                   Automated Microscopy    Pain relieved, case discussed with Dr. Das goal recommends discharge with close follow-up in the office.  Patient has narcotic pain medicine.       Amount and/or Complexity of Data Reviewed  Clinical lab tests: reviewed  Tests in the radiology section of CPT®: reviewed        Final diagnoses:   Left ureteral stone            Prosper Santacruz MD  03/01/21 0738

## 2021-03-01 NOTE — ED NOTES
Patient reports left flank pain x2 days with pain radiating into his testicles. Reports being on antibiotics x2 weeks for UTI, vomiting that started tonight. Reports history of kidney stones and states the pain feels similar.      Lorna Gasca LPN  03/01/21 0247

## 2021-03-02 ENCOUNTER — TRANSCRIBE ORDERS (OUTPATIENT)
Dept: LAB | Facility: HOSPITAL | Age: 61
End: 2021-03-02

## 2021-03-02 ENCOUNTER — HOSPITAL ENCOUNTER (OUTPATIENT)
Dept: CARDIOLOGY | Facility: HOSPITAL | Age: 61
Discharge: HOME | End: 2021-03-02

## 2021-03-02 ENCOUNTER — LAB (OUTPATIENT)
Dept: LAB | Facility: HOSPITAL | Age: 61
End: 2021-03-02

## 2021-03-02 DIAGNOSIS — Z01.818 PREOP TESTING: ICD-10-CM

## 2021-03-02 DIAGNOSIS — Z01.818 PREOP TESTING: Primary | ICD-10-CM

## 2021-03-02 LAB
ANION GAP SERPL CALCULATED.3IONS-SCNC: 10.1 MMOL/L (ref 5–15)
BASOPHILS # BLD AUTO: 0.05 10*3/MM3 (ref 0–0.2)
BASOPHILS NFR BLD AUTO: 0.3 % (ref 0–1.5)
BUN SERPL-MCNC: 22 MG/DL (ref 8–23)
BUN/CREAT SERPL: 13.9 (ref 7–25)
CALCIUM SPEC-SCNC: 9.1 MG/DL (ref 8.6–10.5)
CHLORIDE SERPL-SCNC: 101 MMOL/L (ref 98–107)
CO2 SERPL-SCNC: 27.9 MMOL/L (ref 22–29)
CREAT SERPL-MCNC: 1.58 MG/DL (ref 0.76–1.27)
DEPRECATED RDW RBC AUTO: 41.4 FL (ref 37–54)
EOSINOPHIL # BLD AUTO: 0.07 10*3/MM3 (ref 0–0.4)
EOSINOPHIL NFR BLD AUTO: 0.5 % (ref 0.3–6.2)
ERYTHROCYTE [DISTWIDTH] IN BLOOD BY AUTOMATED COUNT: 12.2 % (ref 12.3–15.4)
GFR SERPL CREATININE-BSD FRML MDRD: 45 ML/MIN/1.73
GLUCOSE SERPL-MCNC: 85 MG/DL (ref 65–99)
HCT VFR BLD AUTO: 43.8 % (ref 37.5–51)
HGB BLD-MCNC: 15.1 G/DL (ref 13–17.7)
IMM GRANULOCYTES # BLD AUTO: 0.05 10*3/MM3 (ref 0–0.05)
IMM GRANULOCYTES NFR BLD AUTO: 0.3 % (ref 0–0.5)
LYMPHOCYTES # BLD AUTO: 1.97 10*3/MM3 (ref 0.7–3.1)
LYMPHOCYTES NFR BLD AUTO: 13.8 % (ref 19.6–45.3)
MCH RBC QN AUTO: 32.1 PG (ref 26.6–33)
MCHC RBC AUTO-ENTMCNC: 34.5 G/DL (ref 31.5–35.7)
MCV RBC AUTO: 93 FL (ref 79–97)
MONOCYTES # BLD AUTO: 1.41 10*3/MM3 (ref 0.1–0.9)
MONOCYTES NFR BLD AUTO: 9.9 % (ref 5–12)
NEUTROPHILS NFR BLD AUTO: 10.74 10*3/MM3 (ref 1.7–7)
NEUTROPHILS NFR BLD AUTO: 75.2 % (ref 42.7–76)
NRBC BLD AUTO-RTO: 0 /100 WBC (ref 0–0.2)
PLATELET # BLD AUTO: 162 10*3/MM3 (ref 140–450)
PMV BLD AUTO: 10.8 FL (ref 6–12)
POTASSIUM SERPL-SCNC: 4.5 MMOL/L (ref 3.5–5.2)
RBC # BLD AUTO: 4.71 10*6/MM3 (ref 4.14–5.8)
SODIUM SERPL-SCNC: 139 MMOL/L (ref 136–145)
WBC # BLD AUTO: 14.29 10*3/MM3 (ref 3.4–10.8)

## 2021-03-02 PROCEDURE — 80048 BASIC METABOLIC PNL TOTAL CA: CPT

## 2021-03-02 PROCEDURE — 93010 ELECTROCARDIOGRAM REPORT: CPT | Performed by: INTERNAL MEDICINE

## 2021-03-02 PROCEDURE — 36415 COLL VENOUS BLD VENIPUNCTURE: CPT

## 2021-03-02 PROCEDURE — 85025 COMPLETE CBC W/AUTO DIFF WBC: CPT

## 2021-03-02 PROCEDURE — 93005 ELECTROCARDIOGRAM TRACING: CPT | Performed by: UROLOGY

## 2021-03-03 LAB — QT INTERVAL: 361 MS

## 2021-03-08 DIAGNOSIS — M79.605 PAIN IN BOTH LOWER EXTREMITIES: ICD-10-CM

## 2021-03-08 DIAGNOSIS — M79.604 PAIN IN BOTH LOWER EXTREMITIES: ICD-10-CM

## 2021-03-08 RX ORDER — HYDROCODONE BITARTRATE AND ACETAMINOPHEN 10; 325 MG/1; MG/1
1 TABLET ORAL EVERY 8 HOURS PRN
Qty: 90 TABLET | Refills: 0 | Status: SHIPPED | OUTPATIENT
Start: 2021-03-08 | End: 2021-04-05

## 2021-03-08 NOTE — TELEPHONE ENCOUNTER
Caller: COLTON JERRYH    Relationship: SELF    Best call back number: [phone]    Medication needed:   Requested Prescriptions     Pending Prescriptions Disp Refills   • HYDROcodone-acetaminophen (NORCO)  MG per tablet 90 tablet 0     Sig: Take 1 tablet by mouth Every 8 (Eight) Hours As Needed for Moderate Pain .       When do you need the refill by: TODAY    What details did the patient provide when requesting the medication:  COMPLETELY OUT OF MEDICATION     Does the patient have less than a 3 day supply:  [x] Yes  [] No    What is the patient's preferred pharmacy: [facility] #[id number] - [address] AT [facility] OF [facility] [address] [facility] [address]  [phone] [facility] [phone] FX

## 2021-03-15 ENCOUNTER — TRANSCRIBE ORDERS (OUTPATIENT)
Dept: ADMINISTRATIVE | Facility: HOSPITAL | Age: 61
End: 2021-03-15

## 2021-03-15 ENCOUNTER — HOSPITAL ENCOUNTER (OUTPATIENT)
Dept: GENERAL RADIOLOGY | Facility: HOSPITAL | Age: 61
Discharge: HOME | End: 2021-03-15
Admitting: UROLOGY

## 2021-03-15 DIAGNOSIS — N20.0 CALCULUS, RENAL: Primary | ICD-10-CM

## 2021-03-15 DIAGNOSIS — N20.0 CALCULUS, RENAL: ICD-10-CM

## 2021-03-15 PROCEDURE — 74018 RADEX ABDOMEN 1 VIEW: CPT

## 2021-03-22 RX ORDER — ERGOCALCIFEROL 1.25 MG/1
50000 CAPSULE ORAL
Qty: 12 CAPSULE | Refills: 0 | Status: SHIPPED | OUTPATIENT
Start: 2021-03-22 | End: 2021-06-01 | Stop reason: SDUPTHER

## 2021-03-31 RX ORDER — OMEPRAZOLE 40 MG/1
40 CAPSULE, DELAYED RELEASE ORAL DAILY
Qty: 90 CAPSULE | Refills: 3 | Status: SHIPPED | OUTPATIENT
Start: 2021-03-31 | End: 2021-07-06 | Stop reason: SDUPTHER

## 2021-03-31 NOTE — TELEPHONE ENCOUNTER
Caller: KING ROSALINO TIANA    Relationship: Emergency Contact    Best call back number: [phone]     Medication needed: ACID REFLUX MEDICATION ( WIFE CALLED DID NOT KNOW NAME OF MEDICINE.) NEW PRESCRIPTION NEEDED  Requested Prescriptions      No prescriptions requested or ordered in this encounter       When do you need the refill by: TODAY    What additional details did the patient provide when requesting the medication:   1-2 DAYS OF MEDICATION REMAINING    Does the patient have less than a 3 day supply:  [x] Yes  [] No    What is the patient's preferred pharmacy: [facility] #[id number] - [address] [facility] [address] [facility]  [phone] [facility] [phone] FX           ”

## 2021-04-05 ENCOUNTER — OFFICE VISIT (OUTPATIENT)
Dept: FAMILY MEDICINE CLINIC | Facility: CLINIC | Age: 61
End: 2021-04-05

## 2021-04-05 ENCOUNTER — LAB (OUTPATIENT)
Dept: FAMILY MEDICINE CLINIC | Facility: CLINIC | Age: 61
End: 2021-04-05

## 2021-04-05 VITALS
OXYGEN SATURATION: 94 % | TEMPERATURE: 97.1 F | SYSTOLIC BLOOD PRESSURE: 128 MMHG | HEART RATE: 73 BPM | BODY MASS INDEX: 41.72 KG/M2 | DIASTOLIC BLOOD PRESSURE: 82 MMHG | WEIGHT: 298 LBS | HEIGHT: 71 IN

## 2021-04-05 DIAGNOSIS — M79.604 PAIN IN BOTH LOWER EXTREMITIES: ICD-10-CM

## 2021-04-05 DIAGNOSIS — R97.20 ELEVATED PSA: Primary | ICD-10-CM

## 2021-04-05 DIAGNOSIS — G89.29 CHRONIC MIDLINE LOW BACK PAIN WITH LEFT-SIDED SCIATICA: ICD-10-CM

## 2021-04-05 DIAGNOSIS — M54.42 CHRONIC MIDLINE LOW BACK PAIN WITH LEFT-SIDED SCIATICA: ICD-10-CM

## 2021-04-05 DIAGNOSIS — M79.605 PAIN IN BOTH LOWER EXTREMITIES: ICD-10-CM

## 2021-04-05 DIAGNOSIS — R97.20 ELEVATED PSA: ICD-10-CM

## 2021-04-05 PROBLEM — Z00.00 PHYSICAL EXAM: Status: RESOLVED | Noted: 2020-10-02 | Resolved: 2021-04-05

## 2021-04-05 PROBLEM — M79.10 MYALGIA: Status: RESOLVED | Noted: 2017-04-20 | Resolved: 2021-04-05

## 2021-04-05 PROBLEM — M22.2X2 PATELLOFEMORAL PAIN SYNDROME OF LEFT KNEE: Status: RESOLVED | Noted: 2020-10-02 | Resolved: 2021-04-05

## 2021-04-05 PROBLEM — J44.1 COPD EXACERBATION (HCC): Status: RESOLVED | Noted: 2020-01-06 | Resolved: 2021-04-05

## 2021-04-05 PROBLEM — R06.02 SHORTNESS OF BREATH: Status: RESOLVED | Noted: 2019-04-01 | Resolved: 2021-04-05

## 2021-04-05 LAB
ANION GAP SERPL CALCULATED.3IONS-SCNC: 9.4 MMOL/L (ref 5–15)
BUN SERPL-MCNC: 17 MG/DL (ref 8–23)
BUN/CREAT SERPL: 17.3 (ref 7–25)
CALCIUM SPEC-SCNC: 9.2 MG/DL (ref 8.6–10.5)
CHLORIDE SERPL-SCNC: 103 MMOL/L (ref 98–107)
CO2 SERPL-SCNC: 30.6 MMOL/L (ref 22–29)
CREAT SERPL-MCNC: 0.98 MG/DL (ref 0.76–1.27)
GFR SERPL CREATININE-BSD FRML MDRD: 78 ML/MIN/1.73
GLUCOSE SERPL-MCNC: 83 MG/DL (ref 65–99)
POTASSIUM SERPL-SCNC: 4.1 MMOL/L (ref 3.5–5.2)
PSA SERPL-MCNC: 5.97 NG/ML (ref 0–4)
SODIUM SERPL-SCNC: 143 MMOL/L (ref 136–145)

## 2021-04-05 PROCEDURE — 84153 ASSAY OF PSA TOTAL: CPT | Performed by: NURSE PRACTITIONER

## 2021-04-05 PROCEDURE — 80048 BASIC METABOLIC PNL TOTAL CA: CPT | Performed by: NURSE PRACTITIONER

## 2021-04-05 PROCEDURE — 36415 COLL VENOUS BLD VENIPUNCTURE: CPT

## 2021-04-05 PROCEDURE — 99214 OFFICE O/P EST MOD 30 MIN: CPT | Performed by: NURSE PRACTITIONER

## 2021-04-05 RX ORDER — HYDROCODONE BITARTRATE AND ACETAMINOPHEN 10; 325 MG/1; MG/1
1 TABLET ORAL EVERY 8 HOURS PRN
Qty: 90 TABLET | Refills: 0 | Status: SHIPPED | OUTPATIENT
Start: 2021-04-05 | End: 2021-05-04 | Stop reason: SDUPTHER

## 2021-04-05 RX ORDER — HYDROCODONE BITARTRATE AND ACETAMINOPHEN 10; 325 MG/1; MG/1
1 TABLET ORAL EVERY 8 HOURS PRN
Qty: 90 TABLET | Refills: 0
Start: 2021-04-05 | End: 2021-04-05 | Stop reason: SDUPTHER

## 2021-04-05 RX ORDER — MELOXICAM 15 MG/1
15 TABLET ORAL DAILY
Qty: 90 TABLET | Refills: 1 | Status: SHIPPED | OUTPATIENT
Start: 2021-04-05 | End: 2021-06-29 | Stop reason: SDUPTHER

## 2021-04-05 NOTE — PROGRESS NOTES
"Subjective   Chi Ramirez II is a 60 y.o. male.     Chief Complaint   Patient presents with   • Hypertension       /82 (BP Location: Right arm, Patient Position: Sitting, Cuff Size: Adult)   Pulse 73   Temp 97.1 °F (36.2 °C) (Skin)   Ht 180.3 cm (71\")   Wt 135 kg (298 lb)   SpO2 94%   BMI 41.56 kg/m²     BP Readings from Last 3 Encounters:   04/05/21 128/82   03/01/21 152/90   02/03/21 120/80       Wt Readings from Last 3 Encounters:   04/05/21 135 kg (298 lb)   03/01/21 (!) 138 kg (303 lb 9.2 oz)   02/02/21 (!) 142 kg (312 lb 3.2 oz)       Pt comes in today for follow up on elevated PSA. Was seen in Feb and PSA level was >15. Dr. Mixon started cipro and recommended a 2 month follow up.   Then, was in ER on 3/1 with kidney stone and started on flomax (he didn't take it), and was referred to urology. Has upcoming appt with Dr. Hendrix on Wednesday 4/7.     Colonoscopy scheduled for 4/12     Pt is also with chronic lower back pain. Takes norco 2-3 times/day. Not taking any NSAIDs. Not exercising.        The following portions of the patient's history were reviewed and updated as appropriate: allergies, current medications, past family history, past medical history, past social history, past surgical history and problem list.    Review of Systems   Endocrine: Negative for polydipsia and polyuria.   Genitourinary: Negative for dysuria and flank pain.   Musculoskeletal: Positive for back pain.       Objective   Physical Exam  Constitutional:       Appearance: He is well-developed.   Eyes:      Pupils: Pupils are equal, round, and reactive to light.   Cardiovascular:      Rate and Rhythm: Normal rate and regular rhythm.   Pulmonary:      Effort: Pulmonary effort is normal.      Breath sounds: Normal breath sounds.   Neurological:      Mental Status: He is alert and oriented to person, place, and time.         Diagnoses and all orders for this visit:    1. Elevated PSA (Primary)  -     PSA DIAGNOSTIC ONLY; " Future  -     Basic Metabolic Panel; Future    2. Pain in both lower extremities  -     Discontinue: HYDROcodone-acetaminophen (NORCO)  MG per tablet; Take 1 tablet by mouth Every 8 (Eight) Hours As Needed for Moderate Pain .  Dispense: 90 tablet; Refill: 0  -     meloxicam (Mobic) 15 MG tablet; Take 1 tablet by mouth Daily.  Dispense: 90 tablet; Refill: 1  -     Discontinue: HYDROcodone-acetaminophen (NORCO)  MG per tablet; Take 1 tablet by mouth Every 8 (Eight) Hours As Needed for Moderate Pain .  Dispense: 90 tablet; Refill: 0  -     HYDROcodone-acetaminophen (NORCO)  MG per tablet; Take 1 tablet by mouth Every 8 (Eight) Hours As Needed for Moderate Pain .  Dispense: 90 tablet; Refill: 0    3. Chronic midline low back pain with left-sided sciatica  -     Discontinue: HYDROcodone-acetaminophen (NORCO)  MG per tablet; Take 1 tablet by mouth Every 8 (Eight) Hours As Needed for Moderate Pain .  Dispense: 90 tablet; Refill: 0  -     meloxicam (Mobic) 15 MG tablet; Take 1 tablet by mouth Daily.  Dispense: 90 tablet; Refill: 1  -     XR Spine Lumbar 2 or 3 View; Future  -     Urine Drug Screen - Urine, Clean Catch; Future  -     Discontinue: HYDROcodone-acetaminophen (NORCO)  MG per tablet; Take 1 tablet by mouth Every 8 (Eight) Hours As Needed for Moderate Pain .  Dispense: 90 tablet; Refill: 0  -     HYDROcodone-acetaminophen (NORCO)  MG per tablet; Take 1 tablet by mouth Every 8 (Eight) Hours As Needed for Moderate Pain .  Dispense: 90 tablet; Refill: 0    will repeat labs today  Start mobic (checking BMP today)  Refill norco  Follow up with urology this week as scheduled  During this office visit, we discussed the pertinent aspects of the visit and treatment recommendations. Pt verbalizes understanding. Follow up was discussed. Patient was given the opportunity to ask questions and discuss other concerns.       Return in about 4 months (around 8/5/2021).

## 2021-04-06 DIAGNOSIS — R97.20 ELEVATED PSA: Primary | ICD-10-CM

## 2021-04-06 NOTE — PROGRESS NOTES
Please let pt know that his PSA was still elevated, but much improved.  I want to refer him to urology (Dr. Hendrix)

## 2021-04-12 ENCOUNTER — ON CAMPUS - OUTPATIENT (AMBULATORY)
Dept: URBAN - METROPOLITAN AREA HOSPITAL 2 | Facility: HOSPITAL | Age: 61
End: 2021-04-12
Payer: COMMERCIAL

## 2021-04-12 ENCOUNTER — OFFICE (AMBULATORY)
Dept: URBAN - METROPOLITAN AREA PATHOLOGY 4 | Facility: PATHOLOGY | Age: 61
End: 2021-04-12
Payer: COMMERCIAL

## 2021-04-12 VITALS
HEART RATE: 59 BPM | WEIGHT: 299 LBS | HEART RATE: 57 BPM | DIASTOLIC BLOOD PRESSURE: 77 MMHG | TEMPERATURE: 98.3 F | OXYGEN SATURATION: 96 % | RESPIRATION RATE: 16 BRPM | SYSTOLIC BLOOD PRESSURE: 123 MMHG | SYSTOLIC BLOOD PRESSURE: 125 MMHG | RESPIRATION RATE: 17 BRPM | SYSTOLIC BLOOD PRESSURE: 101 MMHG | OXYGEN SATURATION: 99 % | OXYGEN SATURATION: 95 % | HEART RATE: 56 BPM | SYSTOLIC BLOOD PRESSURE: 126 MMHG | OXYGEN SATURATION: 94 % | SYSTOLIC BLOOD PRESSURE: 115 MMHG | DIASTOLIC BLOOD PRESSURE: 72 MMHG | HEART RATE: 66 BPM | RESPIRATION RATE: 18 BRPM | DIASTOLIC BLOOD PRESSURE: 65 MMHG | SYSTOLIC BLOOD PRESSURE: 104 MMHG | DIASTOLIC BLOOD PRESSURE: 67 MMHG | HEART RATE: 60 BPM | DIASTOLIC BLOOD PRESSURE: 74 MMHG | HEIGHT: 71 IN | DIASTOLIC BLOOD PRESSURE: 53 MMHG | DIASTOLIC BLOOD PRESSURE: 101 MMHG | HEART RATE: 54 BPM | DIASTOLIC BLOOD PRESSURE: 86 MMHG | SYSTOLIC BLOOD PRESSURE: 105 MMHG | RESPIRATION RATE: 20 BRPM | SYSTOLIC BLOOD PRESSURE: 151 MMHG | RESPIRATION RATE: 13 BRPM | SYSTOLIC BLOOD PRESSURE: 99 MMHG

## 2021-04-12 DIAGNOSIS — D12.4 BENIGN NEOPLASM OF DESCENDING COLON: ICD-10-CM

## 2021-04-12 DIAGNOSIS — D12.3 BENIGN NEOPLASM OF TRANSVERSE COLON: ICD-10-CM

## 2021-04-12 DIAGNOSIS — Z12.11 ENCOUNTER FOR SCREENING FOR MALIGNANT NEOPLASM OF COLON: ICD-10-CM

## 2021-04-12 DIAGNOSIS — D12.2 BENIGN NEOPLASM OF ASCENDING COLON: ICD-10-CM

## 2021-04-12 PROBLEM — K63.5 POLYP OF COLON: Status: ACTIVE | Noted: 2021-04-12

## 2021-04-12 LAB
GI HISTOLOGY: A. UNSPECIFIED: (no result)
GI HISTOLOGY: B. UNSPECIFIED: (no result)
GI HISTOLOGY: C. UNSPECIFIED: (no result)
GI HISTOLOGY: PDF REPORT: (no result)

## 2021-04-12 PROCEDURE — 88305 TISSUE EXAM BY PATHOLOGIST: CPT | Mod: 26 | Performed by: INTERNAL MEDICINE

## 2021-04-12 PROCEDURE — 45385 COLONOSCOPY W/LESION REMOVAL: CPT | Mod: 33 | Performed by: INTERNAL MEDICINE

## 2021-05-04 DIAGNOSIS — M79.605 PAIN IN BOTH LOWER EXTREMITIES: ICD-10-CM

## 2021-05-04 DIAGNOSIS — M79.604 PAIN IN BOTH LOWER EXTREMITIES: ICD-10-CM

## 2021-05-04 DIAGNOSIS — M54.42 CHRONIC MIDLINE LOW BACK PAIN WITH LEFT-SIDED SCIATICA: ICD-10-CM

## 2021-05-04 DIAGNOSIS — G89.29 CHRONIC MIDLINE LOW BACK PAIN WITH LEFT-SIDED SCIATICA: ICD-10-CM

## 2021-05-04 RX ORDER — HYDROCODONE BITARTRATE AND ACETAMINOPHEN 10; 325 MG/1; MG/1
1 TABLET ORAL EVERY 8 HOURS PRN
Qty: 90 TABLET | Refills: 0 | Status: SHIPPED | OUTPATIENT
Start: 2021-05-04 | End: 2021-06-01 | Stop reason: SDUPTHER

## 2021-06-01 DIAGNOSIS — M79.605 PAIN IN BOTH LOWER EXTREMITIES: ICD-10-CM

## 2021-06-01 DIAGNOSIS — M79.604 PAIN IN BOTH LOWER EXTREMITIES: ICD-10-CM

## 2021-06-01 DIAGNOSIS — G89.29 CHRONIC MIDLINE LOW BACK PAIN WITH LEFT-SIDED SCIATICA: ICD-10-CM

## 2021-06-01 DIAGNOSIS — M54.42 CHRONIC MIDLINE LOW BACK PAIN WITH LEFT-SIDED SCIATICA: ICD-10-CM

## 2021-06-01 RX ORDER — HYDROCODONE BITARTRATE AND ACETAMINOPHEN 10; 325 MG/1; MG/1
1 TABLET ORAL EVERY 8 HOURS PRN
Qty: 90 TABLET | Refills: 0 | Status: SHIPPED | OUTPATIENT
Start: 2021-06-01 | End: 2021-06-29 | Stop reason: SDUPTHER

## 2021-06-01 RX ORDER — ERGOCALCIFEROL 1.25 MG/1
50000 CAPSULE ORAL
Qty: 12 CAPSULE | Refills: 0 | Status: SHIPPED | OUTPATIENT
Start: 2021-06-01 | End: 2021-08-25 | Stop reason: SDUPTHER

## 2021-06-03 RX ORDER — ATORVASTATIN CALCIUM 10 MG/1
10 TABLET, FILM COATED ORAL DAILY
Qty: 90 TABLET | Refills: 1 | Status: SHIPPED | OUTPATIENT
Start: 2021-06-03 | End: 2021-06-06 | Stop reason: SDUPTHER

## 2021-06-07 RX ORDER — ATORVASTATIN CALCIUM 10 MG/1
10 TABLET, FILM COATED ORAL DAILY
Qty: 90 TABLET | Refills: 1 | Status: SHIPPED | OUTPATIENT
Start: 2021-06-07 | End: 2021-12-10 | Stop reason: SDUPTHER

## 2021-06-29 DIAGNOSIS — M79.605 PAIN IN BOTH LOWER EXTREMITIES: ICD-10-CM

## 2021-06-29 DIAGNOSIS — M54.42 CHRONIC MIDLINE LOW BACK PAIN WITH LEFT-SIDED SCIATICA: ICD-10-CM

## 2021-06-29 DIAGNOSIS — G89.29 CHRONIC MIDLINE LOW BACK PAIN WITH LEFT-SIDED SCIATICA: ICD-10-CM

## 2021-06-29 DIAGNOSIS — M79.604 PAIN IN BOTH LOWER EXTREMITIES: ICD-10-CM

## 2021-06-30 RX ORDER — MELOXICAM 15 MG/1
15 TABLET ORAL DAILY
Qty: 90 TABLET | Refills: 1 | Status: SHIPPED | OUTPATIENT
Start: 2021-06-30 | End: 2021-10-05 | Stop reason: SDUPTHER

## 2021-06-30 RX ORDER — HYDROCODONE BITARTRATE AND ACETAMINOPHEN 10; 325 MG/1; MG/1
1 TABLET ORAL EVERY 8 HOURS PRN
Qty: 90 TABLET | Refills: 0 | Status: SHIPPED | OUTPATIENT
Start: 2021-06-30 | End: 2021-07-28 | Stop reason: SDUPTHER

## 2021-07-06 RX ORDER — OMEPRAZOLE 40 MG/1
40 CAPSULE, DELAYED RELEASE ORAL DAILY
Qty: 90 CAPSULE | Refills: 3 | Status: SHIPPED | OUTPATIENT
Start: 2021-07-06 | End: 2021-10-12 | Stop reason: SDUPTHER

## 2021-07-28 DIAGNOSIS — G89.29 CHRONIC MIDLINE LOW BACK PAIN WITH LEFT-SIDED SCIATICA: ICD-10-CM

## 2021-07-28 DIAGNOSIS — M79.604 PAIN IN BOTH LOWER EXTREMITIES: ICD-10-CM

## 2021-07-28 DIAGNOSIS — M54.42 CHRONIC MIDLINE LOW BACK PAIN WITH LEFT-SIDED SCIATICA: ICD-10-CM

## 2021-07-28 DIAGNOSIS — M79.605 PAIN IN BOTH LOWER EXTREMITIES: ICD-10-CM

## 2021-07-29 RX ORDER — HYDROCODONE BITARTRATE AND ACETAMINOPHEN 10; 325 MG/1; MG/1
1 TABLET ORAL EVERY 8 HOURS PRN
Qty: 90 TABLET | Refills: 0 | Status: SHIPPED | OUTPATIENT
Start: 2021-07-29 | End: 2021-08-25 | Stop reason: SDUPTHER

## 2021-08-11 ENCOUNTER — TELEPHONE (OUTPATIENT)
Dept: FAMILY MEDICINE CLINIC | Facility: CLINIC | Age: 61
End: 2021-08-11

## 2021-08-11 ENCOUNTER — OFFICE VISIT (OUTPATIENT)
Dept: FAMILY MEDICINE CLINIC | Facility: CLINIC | Age: 61
End: 2021-08-11

## 2021-08-11 VITALS
HEIGHT: 71 IN | SYSTOLIC BLOOD PRESSURE: 142 MMHG | RESPIRATION RATE: 16 BRPM | OXYGEN SATURATION: 91 % | DIASTOLIC BLOOD PRESSURE: 84 MMHG | WEIGHT: 304 LBS | BODY MASS INDEX: 42.56 KG/M2 | HEART RATE: 58 BPM

## 2021-08-11 DIAGNOSIS — M54.50 CHRONIC MIDLINE LOW BACK PAIN WITHOUT SCIATICA: ICD-10-CM

## 2021-08-11 DIAGNOSIS — R97.20 ELEVATED PSA: ICD-10-CM

## 2021-08-11 DIAGNOSIS — G89.29 CHRONIC MIDLINE LOW BACK PAIN WITHOUT SCIATICA: ICD-10-CM

## 2021-08-11 DIAGNOSIS — I10 ESSENTIAL HYPERTENSION: Primary | ICD-10-CM

## 2021-08-11 DIAGNOSIS — J41.0 SIMPLE CHRONIC BRONCHITIS (HCC): ICD-10-CM

## 2021-08-11 PROCEDURE — 99214 OFFICE O/P EST MOD 30 MIN: CPT | Performed by: NURSE PRACTITIONER

## 2021-08-11 RX ORDER — LOSARTAN POTASSIUM 100 MG/1
100 TABLET ORAL DAILY
Qty: 90 TABLET | Refills: 1 | Status: SHIPPED | OUTPATIENT
Start: 2021-08-11 | End: 2021-11-17 | Stop reason: SDUPTHER

## 2021-08-11 NOTE — ASSESSMENT & PLAN NOTE
COPD is unchanged.  Counseled to avoid exposure to cigarette smoke.  Discussed low dose CT scan, but would like to wait until next appt. Discussed the hazards of tobacco use. Smoking cessation recommended and techniques and options to hep patient quit were discussed.

## 2021-08-11 NOTE — TELEPHONE ENCOUNTER
Patient states that he forgot to ask you about taking the vitamin supplements called Relief Factor for his back and leg pain. Patient is asking if you think this would be ok for him to try.

## 2021-08-11 NOTE — PROGRESS NOTES
"Chief Complaint  Hypertension (4 month f/u)  Subjective        Chi Ramirez II presents to [facility]  Pt comes in today for follow up on chronic lower back pain. Takes norco 2-3 times. Helps with ADLs. Also on mobic and it is helping as well. Needs to work on weight loss.   Since last seen he saw urology for kidney stones and elevated PSA, but hasn't been back. He is due for follow up regarding elevated PSA. Last PSA level was 15.5.        Objective     Vital Signs:   /84 (BP Location: Left arm, Patient Position: Sitting, Cuff Size: Large Adult)   Pulse 58   Resp 16   Ht 180.3 cm (71\")   Wt (!) 138 kg (304 lb)   SpO2 91%   BMI 42.40 kg/m²       BP Readings from Last 3 Encounters:   08/11/21 142/84   04/05/21 128/82   03/01/21 152/90       Wt Readings from Last 3 Encounters:   08/11/21 (!) 138 kg (304 lb)   04/05/21 135 kg (298 lb)   03/01/21 (!) 138 kg (303 lb 9.2 oz)     Physical Exam  Constitutional:       Appearance: He is well-developed. He is obese.   Eyes:      Pupils: Pupils are equal, round, and reactive to light.   Cardiovascular:      Rate and Rhythm: Normal rate and regular rhythm.   Pulmonary:      Effort: Pulmonary effort is normal.      Breath sounds: Normal breath sounds.   Neurological:      Mental Status: He is alert and oriented to person, place, and time.        Result Review :   The following data was reviewed by: PILI Long on 08/11/2021:  PSA    PSA 2/2/21 4/5/21   PSA 15.500 (A) 5.970 (A)   (A) Abnormal value            Data reviewed: Consultant notes urology           Assessment and Plan    Diagnoses and all orders for this visit:    1. Essential hypertension (Primary)  -     losartan (COZAAR) 100 MG tablet; Take 1 tablet by mouth Daily.  Dispense: 90 tablet; Refill: 1    2. Elevated PSA  Comments:  pt needs to follow up with Dr. Simeon LI.     3. Simple chronic bronchitis (CMS/HCC)  Assessment & Plan:  COPD is " unchanged.  Counseled to avoid exposure to cigarette smoke.  Discussed low dose CT scan, but would like to wait until next appt. Discussed the hazards of tobacco use. Smoking cessation recommended and techniques and options to hep patient quit were discussed.           4. Chronic midline low back pain without sciatica  Assessment & Plan:  Will cont with norco. Pt also needs to work on weight loss and strengthening exercises.     refill losartan  Follow up with Dr. Simeon LI  Discussed importance of regular exercise and recommended starting or continuing a regular exercise program for good health. The patient was also encouraged to lose weight for better health.         Follow Up   Return in about 4 months (around 12/11/2021).  Patient was given instructions and counseling regarding his condition or for health maintenance advice. Please see specific information pulled into the AVS if appropriate.        Number Of Deep Sutures (Optional): 3

## 2021-08-25 DIAGNOSIS — G89.29 CHRONIC MIDLINE LOW BACK PAIN WITH LEFT-SIDED SCIATICA: ICD-10-CM

## 2021-08-25 DIAGNOSIS — M79.604 PAIN IN BOTH LOWER EXTREMITIES: ICD-10-CM

## 2021-08-25 DIAGNOSIS — M54.42 CHRONIC MIDLINE LOW BACK PAIN WITH LEFT-SIDED SCIATICA: ICD-10-CM

## 2021-08-25 DIAGNOSIS — M79.605 PAIN IN BOTH LOWER EXTREMITIES: ICD-10-CM

## 2021-08-25 RX ORDER — ERGOCALCIFEROL 1.25 MG/1
50000 CAPSULE ORAL
Qty: 12 CAPSULE | Refills: 0 | Status: SHIPPED | OUTPATIENT
Start: 2021-08-25 | End: 2021-11-22 | Stop reason: SDUPTHER

## 2021-08-25 RX ORDER — HYDROCODONE BITARTRATE AND ACETAMINOPHEN 10; 325 MG/1; MG/1
1 TABLET ORAL EVERY 8 HOURS PRN
Qty: 90 TABLET | Refills: 0 | Status: SHIPPED | OUTPATIENT
Start: 2021-08-25 | End: 2021-08-27 | Stop reason: SDUPTHER

## 2021-08-27 DIAGNOSIS — M79.604 PAIN IN BOTH LOWER EXTREMITIES: ICD-10-CM

## 2021-08-27 DIAGNOSIS — M79.605 PAIN IN BOTH LOWER EXTREMITIES: ICD-10-CM

## 2021-08-27 DIAGNOSIS — G89.29 CHRONIC MIDLINE LOW BACK PAIN WITH LEFT-SIDED SCIATICA: ICD-10-CM

## 2021-08-27 DIAGNOSIS — M54.42 CHRONIC MIDLINE LOW BACK PAIN WITH LEFT-SIDED SCIATICA: ICD-10-CM

## 2021-08-27 NOTE — TELEPHONE ENCOUNTER
Caller: TIANA VALERIO    Relationship: Emergency Contact    Best call back number: [phone]    Medication needed:   Requested Prescriptions     Pending Prescriptions Disp Refills   • HYDROcodone-acetaminophen (NORCO)  MG per tablet 90 tablet 0     Sig: Take 1 tablet by mouth Every 8 (Eight) Hours As Needed for Moderate Pain .       When do you need the refill by: TODAY    What additional details did the patient provide when requesting the medication:   MS. JERRY SAYS THAT [facility] IN [facility] IS UNABLE TO FILL THE PRESCRIPTION BECAUSE THEY HAVE REACHED THEIR LIMIT ON FILLING THIS MEDICATION FOR MONTH OF AUGUST. SHE IS NEEDING A NEW REFILL SENT TO [facility].     2 DAYS OF MEDICATION REMAINING  Does the patient have less than a 3 day supply:  [x] Yes  [] No    What is the patient's preferred pharmacy: [facility] [place] - [address] [place] [address] - [phone] [facility] [phone] FX

## 2021-08-30 RX ORDER — HYDROCODONE BITARTRATE AND ACETAMINOPHEN 10; 325 MG/1; MG/1
1 TABLET ORAL EVERY 8 HOURS PRN
Qty: 90 TABLET | Refills: 0 | Status: SHIPPED | OUTPATIENT
Start: 2021-08-30 | End: 2021-09-27 | Stop reason: SDUPTHER

## 2021-09-27 DIAGNOSIS — G89.29 CHRONIC MIDLINE LOW BACK PAIN WITH LEFT-SIDED SCIATICA: ICD-10-CM

## 2021-09-27 DIAGNOSIS — M79.604 PAIN IN BOTH LOWER EXTREMITIES: ICD-10-CM

## 2021-09-27 DIAGNOSIS — M54.42 CHRONIC MIDLINE LOW BACK PAIN WITH LEFT-SIDED SCIATICA: ICD-10-CM

## 2021-09-27 DIAGNOSIS — M79.605 PAIN IN BOTH LOWER EXTREMITIES: ICD-10-CM

## 2021-09-27 RX ORDER — HYDROCODONE BITARTRATE AND ACETAMINOPHEN 10; 325 MG/1; MG/1
1 TABLET ORAL EVERY 8 HOURS PRN
Qty: 90 TABLET | Refills: 0 | Status: SHIPPED | OUTPATIENT
Start: 2021-09-27 | End: 2021-09-29 | Stop reason: SDUPTHER

## 2021-09-27 NOTE — TELEPHONE ENCOUNTER
Incoming Refill Request      Medication requested (name and dose):   HYDROcodone-acetaminophen (NORCO)  MG per tablet  1 tablet, Every 8 Hours PRN 0 ordered         Pharm[facility] where [facility] be sent: [id number] - [place] [facility] [address] - [phone] PH - [phone] FX  [phone]    Additional details provided by patient: PATIENT WILL BE OUT IN ONE DAY    Best call back number: [phone]    Does the patient have less than a 3 day supply:  [x] Yes  [] No    Jm Dubose Rep  09/27/21, 16:32 EDT

## 2021-09-28 DIAGNOSIS — M54.42 CHRONIC MIDLINE LOW BACK PAIN WITH LEFT-SIDED SCIATICA: ICD-10-CM

## 2021-09-28 DIAGNOSIS — G89.29 CHRONIC MIDLINE LOW BACK PAIN WITH LEFT-SIDED SCIATICA: ICD-10-CM

## 2021-09-28 DIAGNOSIS — M79.605 PAIN IN BOTH LOWER EXTREMITIES: ICD-10-CM

## 2021-09-28 DIAGNOSIS — M79.604 PAIN IN BOTH LOWER EXTREMITIES: ICD-10-CM

## 2021-09-28 RX ORDER — HYDROCODONE BITARTRATE AND ACETAMINOPHEN 10; 325 MG/1; MG/1
1 TABLET ORAL EVERY 8 HOURS PRN
Qty: 90 TABLET | Refills: 0 | Status: CANCELLED | OUTPATIENT
Start: 2021-09-28

## 2021-09-28 NOTE — TELEPHONE ENCOUNTER
Actually this was sent yesterday. This was pinned by patient and not pharmacy so it should be there and ready to      Pharmacy confirmed receipt at 4:50 pm yesterday

## 2021-09-28 NOTE — TELEPHONE ENCOUNTER
Well, this is a PA.    I messaged patient to let him know we did not deny. The insurance is so I will push for PA or use [facility]. Whatever is cheaper.

## 2021-09-29 DIAGNOSIS — M79.604 PAIN IN BOTH LOWER EXTREMITIES: ICD-10-CM

## 2021-09-29 DIAGNOSIS — M79.605 PAIN IN BOTH LOWER EXTREMITIES: ICD-10-CM

## 2021-09-29 DIAGNOSIS — M54.42 CHRONIC MIDLINE LOW BACK PAIN WITH LEFT-SIDED SCIATICA: ICD-10-CM

## 2021-09-29 DIAGNOSIS — G89.29 CHRONIC MIDLINE LOW BACK PAIN WITH LEFT-SIDED SCIATICA: ICD-10-CM

## 2021-09-29 RX ORDER — HYDROCODONE BITARTRATE AND ACETAMINOPHEN 10; 325 MG/1; MG/1
1 TABLET ORAL EVERY 8 HOURS PRN
Qty: 90 TABLET | Refills: 0 | Status: SHIPPED | OUTPATIENT
Start: 2021-09-29 | End: 2021-10-28 | Stop reason: SDUPTHER

## 2021-09-29 NOTE — TELEPHONE ENCOUNTER
Caller: TIANA VALERIO    Relationship to patient: Emergency Contact    Best call back number: [phone]   Patient is needing:      MS JERRY WOULD LIKE TO KNOW IF EVERETT'S MEDICATION WAS SENT  TO [facility]       [facility] [place] - [address] [place] [address] - [phone]  - [phone]   [phone]    PLEASE ADVISE

## 2021-10-05 DIAGNOSIS — M79.605 PAIN IN BOTH LOWER EXTREMITIES: ICD-10-CM

## 2021-10-05 DIAGNOSIS — G89.29 CHRONIC MIDLINE LOW BACK PAIN WITH LEFT-SIDED SCIATICA: ICD-10-CM

## 2021-10-05 DIAGNOSIS — M54.42 CHRONIC MIDLINE LOW BACK PAIN WITH LEFT-SIDED SCIATICA: ICD-10-CM

## 2021-10-05 DIAGNOSIS — M79.604 PAIN IN BOTH LOWER EXTREMITIES: ICD-10-CM

## 2021-10-05 RX ORDER — MELOXICAM 15 MG/1
15 TABLET ORAL DAILY
Qty: 90 TABLET | Refills: 1 | Status: SHIPPED | OUTPATIENT
Start: 2021-10-05 | End: 2022-07-25

## 2021-10-05 RX ORDER — FLUTICASONE PROPIONATE 50 MCG
2 SPRAY, SUSPENSION (ML) NASAL DAILY
Qty: 16 G | Refills: 3 | Status: SHIPPED | OUTPATIENT
Start: 2021-10-05 | End: 2022-09-06

## 2021-10-05 NOTE — TELEPHONE ENCOUNTER
Incoming Refill Request      Medication requested (name and dose): fluticasone (Flonase) 50 MCG/ACT nasal spray AND meloxicam (Mobic) 15 MG tablet    Pharmacy where request should be sent: [facility] [place] [address] - [phone]  - [phone]   [phone]    Additional details provided by patient: PATIENT IS OUT    Best call back number: [phone]    Does the patient have less than a 3 day supply:  [x] Yes  [] No    Jm Reynolsd Rep  10/05/21, 14:44 EDT

## 2021-10-12 RX ORDER — METOPROLOL SUCCINATE 50 MG/1
50 TABLET, EXTENDED RELEASE ORAL DAILY
Qty: 90 TABLET | Refills: 3 | Status: SHIPPED | OUTPATIENT
Start: 2021-10-12 | End: 2022-10-23

## 2021-10-12 RX ORDER — OMEPRAZOLE 40 MG/1
40 CAPSULE, DELAYED RELEASE ORAL DAILY
Qty: 90 CAPSULE | Refills: 3 | Status: SHIPPED | OUTPATIENT
Start: 2021-10-12 | End: 2022-11-03

## 2021-10-12 NOTE — TELEPHONE ENCOUNTER
Caller: TIANA VALERIO    Relationship: Emergency Contact      Medication requested (name and dosage): metoprolol succinate XL (TOPROL-XL) 50 MG 24 hr tablet, omeprazole (priLOSEC) 40 MG capsule    Pharmacy where request should be sent: [facility] [place] - [address] [place] [address] - [phone]  - [phone]   [phone]    Additional details provided by patient: 3 DAYS OF MEDICATION REMAINING ,     90 DAY SUPPLY     Best call back number: [phone]     Does the patient have less than a 3 day supply:  [] Yes  [x] No    Anoop Cardenas   10/12/21 15:12 EDT

## 2021-10-28 ENCOUNTER — TELEPHONE (OUTPATIENT)
Dept: FAMILY MEDICINE CLINIC | Facility: CLINIC | Age: 61
End: 2021-10-28

## 2021-10-28 DIAGNOSIS — M79.605 PAIN IN BOTH LOWER EXTREMITIES: ICD-10-CM

## 2021-10-28 DIAGNOSIS — G89.29 CHRONIC MIDLINE LOW BACK PAIN WITH LEFT-SIDED SCIATICA: ICD-10-CM

## 2021-10-28 DIAGNOSIS — M54.42 CHRONIC MIDLINE LOW BACK PAIN WITH LEFT-SIDED SCIATICA: Primary | ICD-10-CM

## 2021-10-28 DIAGNOSIS — M54.42 CHRONIC MIDLINE LOW BACK PAIN WITH LEFT-SIDED SCIATICA: ICD-10-CM

## 2021-10-28 DIAGNOSIS — M79.604 PAIN IN BOTH LOWER EXTREMITIES: ICD-10-CM

## 2021-10-28 DIAGNOSIS — G89.29 CHRONIC MIDLINE LOW BACK PAIN WITH LEFT-SIDED SCIATICA: Primary | ICD-10-CM

## 2021-10-28 RX ORDER — HYDROCODONE BITARTRATE AND ACETAMINOPHEN 10; 325 MG/1; MG/1
1 TABLET ORAL EVERY 8 HOURS PRN
Qty: 90 TABLET | Refills: 0 | Status: SHIPPED | OUTPATIENT
Start: 2021-10-28 | End: 2021-11-29 | Stop reason: SDUPTHER

## 2021-10-28 NOTE — TELEPHONE ENCOUNTER
Caller: TIANA VALERIO    Relationship: Emergency Contact    Best call back number: [phone]    What form or medical record are you requesting: [facility]     Who is requesting this form or medical record from you:   [facility]     How would you like to receive the form or medical records (pick-up, mail, fax): FAX  PATIENT DID NOT HAVE FAX NUMBER    Timeframe paperwork needed: ASAP    Additional notes: PATIENT'S WIFE SAID HER  WANTS TO SEE DR. NÚÑEZ FOR PAIN MANAGEMENT, AND THEY NEED TO HAVE HIS MEDICAL RECORDS FROM THE OFFICE FAXED OVER    SHE SAID HE WILL CONTINUE SEEING [facility]MA[facility] OLEA FOR HIS PRIMARY CARE

## 2021-10-28 NOTE — TELEPHONE ENCOUNTER
Incoming Refill Request      Medication requested (name and dose):    HYDROcodone-acetaminophen (NORCO)  MG per tablet  1 tablet, Every 8 Hours PRN         Pharmacy where request should be sent: [facility] [place] - [address] [place] [address] DR - [phone]  - [phone] FX  [phone]    Additional details provided by patient: NONE    Best call back number: [phone]    Does the patient have less than a 3 day supply:  [x] Yes  [] No    Jm Dubose Rep  10/28/21, 13:07 EDT

## 2021-11-10 ENCOUNTER — TELEPHONE (OUTPATIENT)
Dept: FAMILY MEDICINE CLINIC | Facility: CLINIC | Age: 61
End: 2021-11-10

## 2021-11-10 DIAGNOSIS — M54.42 CHRONIC MIDLINE LOW BACK PAIN WITH LEFT-SIDED SCIATICA: Primary | ICD-10-CM

## 2021-11-10 DIAGNOSIS — G89.29 CHRONIC MIDLINE LOW BACK PAIN WITH LEFT-SIDED SCIATICA: Primary | ICD-10-CM

## 2021-11-10 NOTE — TELEPHONE ENCOUNTER
Caller: OWEN JERRYTIANA    Relationship: Emergency Contact    Best call back number: [phone]    What orders are you requesting (i.e. lab or imaging): XRAY OF BACK    Where will you receive your lab/imaging services [facility]    Additional notes:  PATIENT IS SEEING PAIN MANAGEMENT TO SET UP A PLAN OF CARE BUT THEY ARE REQUIRING AN XRAY BEFORE TREATMENT.

## 2021-11-11 ENCOUNTER — HOSPITAL ENCOUNTER (OUTPATIENT)
Dept: GENERAL RADIOLOGY | Facility: HOSPITAL | Age: 61
Discharge: HOME | End: 2021-11-11
Admitting: NURSE PRACTITIONER

## 2021-11-11 DIAGNOSIS — M54.50 LOW BACK PAIN, UNSPECIFIED BACK PAIN LATERALITY, UNSPECIFIED CHRONICITY, UNSPECIFIED WHETHER SCIATICA PRESENT: Primary | ICD-10-CM

## 2021-11-11 DIAGNOSIS — M54.50 LOW BACK PAIN, UNSPECIFIED BACK PAIN LATERALITY, UNSPECIFIED CHRONICITY, UNSPECIFIED WHETHER SCIATICA PRESENT: ICD-10-CM

## 2021-11-11 PROCEDURE — 72100 X-RAY EXAM L-S SPINE 2/3 VWS: CPT

## 2021-11-17 DIAGNOSIS — I10 ESSENTIAL HYPERTENSION: ICD-10-CM

## 2021-11-17 RX ORDER — LOSARTAN POTASSIUM 100 MG/1
100 TABLET ORAL DAILY
Qty: 90 TABLET | Refills: 1 | Status: SHIPPED | OUTPATIENT
Start: 2021-11-17 | End: 2021-12-08 | Stop reason: SDUPTHER

## 2021-11-17 NOTE — TELEPHONE ENCOUNTER
Caller: TIANA VALERIO    Relationship: Emergency Contact    Best call back number: [phone]    Requested Prescriptions:   Requested Prescriptions     Pending Prescriptions Disp Refills   • losartan (COZAAR) 100 MG tablet 90 tablet 1     Sig: Take 1 tablet by mouth Daily.        Pharmacy where request should be sent: [facility] [place] - [address] - [phone]  - [phone] FX     Additional details provided by patient:3 DAYS LEFT OF MEDICATION    Does the patient have less than a 3 day supply:  [] Yes  [x] No    Elinor Jauregui, Jm Rep   11/17/21 12:32 EST

## 2021-11-22 RX ORDER — ERGOCALCIFEROL 1.25 MG/1
50000 CAPSULE ORAL
Qty: 12 CAPSULE | Refills: 0 | Status: SHIPPED | OUTPATIENT
Start: 2021-11-22 | End: 2022-02-16

## 2021-11-22 NOTE — TELEPHONE ENCOUNTER
Incoming Refill Request      Medication requested (name and dose):   tamin D (ERGOCALCIFEROL) 1.25 MG (93253 UT) capsule capsule  50,000 Units, Every 7 Days         Pharmacy where request should be sent:[facility] [place] - [address] [place] [address] - [phone]  - [phone]   [phone]     Additional details provided by patient: PATIENT HAS ONE OR TWO PILLS LEFT     Best call back number: [phone]    Does the patient have less than a 3 day supply:  [x] Yes  [] No    Jm Dubose Rep  11/22/21, 13:27 EST

## 2021-11-29 ENCOUNTER — OFFICE VISIT (OUTPATIENT)
Dept: PAIN MEDICINE | Facility: CLINIC | Age: 61
End: 2021-11-29

## 2021-11-29 VITALS
HEART RATE: 63 BPM | HEIGHT: 71 IN | OXYGEN SATURATION: 91 % | RESPIRATION RATE: 16 BRPM | TEMPERATURE: 97.3 F | WEIGHT: 305 LBS | BODY MASS INDEX: 42.7 KG/M2 | SYSTOLIC BLOOD PRESSURE: 182 MMHG | DIASTOLIC BLOOD PRESSURE: 87 MMHG

## 2021-11-29 DIAGNOSIS — G89.29 CHRONIC MIDLINE LOW BACK PAIN WITH LEFT-SIDED SCIATICA: ICD-10-CM

## 2021-11-29 DIAGNOSIS — M79.604 PAIN IN BOTH LOWER EXTREMITIES: ICD-10-CM

## 2021-11-29 DIAGNOSIS — M54.42 CHRONIC MIDLINE LOW BACK PAIN WITH LEFT-SIDED SCIATICA: ICD-10-CM

## 2021-11-29 DIAGNOSIS — Z79.899 HIGH RISK MEDICATION USE: Primary | ICD-10-CM

## 2021-11-29 DIAGNOSIS — M47.816 SPONDYLOSIS OF LUMBAR REGION WITHOUT MYELOPATHY OR RADICULOPATHY: ICD-10-CM

## 2021-11-29 DIAGNOSIS — G89.29 CHRONIC MIDLINE LOW BACK PAIN WITHOUT SCIATICA: ICD-10-CM

## 2021-11-29 DIAGNOSIS — M25.562 CHRONIC PAIN OF LEFT KNEE: ICD-10-CM

## 2021-11-29 DIAGNOSIS — M54.50 CHRONIC MIDLINE LOW BACK PAIN WITHOUT SCIATICA: ICD-10-CM

## 2021-11-29 DIAGNOSIS — M25.551 BILATERAL HIP PAIN: ICD-10-CM

## 2021-11-29 DIAGNOSIS — G89.29 CHRONIC PAIN OF LEFT KNEE: ICD-10-CM

## 2021-11-29 DIAGNOSIS — M25.552 BILATERAL HIP PAIN: ICD-10-CM

## 2021-11-29 DIAGNOSIS — M79.605 PAIN IN BOTH LOWER EXTREMITIES: ICD-10-CM

## 2021-11-29 PROCEDURE — G0463 HOSPITAL OUTPT CLINIC VISIT: HCPCS | Performed by: PHYSICAL MEDICINE & REHABILITATION

## 2021-11-29 PROCEDURE — 99204 OFFICE O/P NEW MOD 45 MIN: CPT | Performed by: PHYSICAL MEDICINE & REHABILITATION

## 2021-11-29 RX ORDER — HYDROCODONE BITARTRATE AND ACETAMINOPHEN 10; 325 MG/1; MG/1
1 TABLET ORAL EVERY 8 HOURS PRN
Qty: 21 TABLET | Refills: 0 | Status: SHIPPED | OUTPATIENT
Start: 2021-11-29 | End: 2022-02-07 | Stop reason: SDUPTHER

## 2021-11-29 RX ORDER — HYDROCODONE BITARTRATE AND ACETAMINOPHEN 10; 325 MG/1; MG/1
1 TABLET ORAL EVERY 8 HOURS PRN
Qty: 90 TABLET | Refills: 0 | Status: SHIPPED | OUTPATIENT
Start: 2021-11-29 | End: 2022-01-17

## 2021-11-29 RX ORDER — VALACYCLOVIR HYDROCHLORIDE 500 MG/1
1000 TABLET, FILM COATED ORAL 2 TIMES DAILY
COMMUNITY
End: 2022-10-23

## 2021-11-29 NOTE — PROGRESS NOTES
Subjective   Chi Ramirez II is a 60 y.o. male.     Chronic low back pain, nonradiating, also left knee pain and b/l hip pain, 6/10 at worst, 3/10 at best, always present, varies, began 2018 with workplace injury, burning, tingling, worse with standing and walking, interferes with ADLs, sleep, failed PT. 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 Saw PCP, notes reviewed, as above, with referral for pain management, stable on Norco 10mg TID prn, no red flags notes. No FH of substance abuse.        The following portions of the patient's history were reviewed and updated as appropriate: allergies, current medications, past family history, past medical history, past social history, past surgical history and problem list.    Review of Systems   Constitutional: Positive for fatigue. Negative for chills and fever.   HENT: Positive for hearing loss. Negative for trouble swallowing.    Eyes: Negative for visual disturbance.   Respiratory: Positive for shortness of breath.    Cardiovascular: Negative for chest pain.   Gastrointestinal: Positive for constipation. Negative for abdominal pain, diarrhea, nausea and vomiting.   Genitourinary: Negative for urinary incontinence.   Musculoskeletal: Positive for arthralgias, back pain and joint swelling. Negative for myalgias and neck pain.   Neurological: Positive for headache. Negative for dizziness, weakness and numbness.       Objective   Physical Exam  Constitutional:       Appearance: Normal appearance. He is well-developed.   HENT:      Head: Normocephalic and atraumatic.   Eyes:      Extraocular Movements: Extraocular movements intact.      Pupils: Pupils are equal, round, and reactive to light.   Cardiovascular:      Rate and Rhythm: Normal rate and regular rhythm.      Heart sounds: Normal heart sounds.   Pulmonary:      Effort: Pulmonary effort is normal.      Breath sounds: Normal breath sounds.   Abdominal:      General: Bowel sounds are normal. There is no  distension.      Palpations: Abdomen is soft.      Tenderness: There is no abdominal tenderness.   Musculoskeletal:      Cervical back: Normal range of motion.      Comments: Pain in b/l groin with FADIR   Neurological:      Mental Status: He is alert and oriented to person, place, and time.      Sensory: No sensory deficit.      Deep Tendon Reflexes: Reflexes are normal and symmetric.   Psychiatric:         Mood and Affect: Mood normal.         Behavior: Behavior normal.         Thought Content: Thought content normal.         Judgment: Judgment normal.           Assessment/Plan   Diagnoses and all orders for this visit:    1. Chronic midline low back pain without sciatica (Primary)    2. Bilateral hip pain    3. Chronic pain of left knee    4. Spondylosis of lumbar region without myelopathy or radiculopathy        Discussed risks and benefits of opioid treatment for chonic pain with patient, including expectations related to prescription requests, alternative modalities to opioids for managing pain, her treatment plan, risks of dependency and addiction, and safe storage practices for prescribed opioids, as well as proper and improper disposal of all medications.  Will obtain UDS today, pain contract today.  Treatment plan will consist of continuing current medication as long as it remains effective and is necessary, while evaluating patient at each visit and determining if the medication can be lowered or discontinued, while also using nonopioid therapies to reduce reliance on opioids.  Cont Norco 10mg TID prn.  May inject L knee, b/l hips in future.  RTC in 3 months with wife.      INSPECT REPORT     As part of the patient's treatment plan, I am prescribing controlled substances. The patient has been made aware of appropriate use of such medications, including potential risk of somnolence, limited ability to drive and/or work safely, and the potential for dependence or overdose. It has also bee made clear that  these medications are for use by this patient only, without concomitant use of alcohol or other substances unless prescribed.      Patient has completed prescribing agreement detailing terms of continued prescribing of controlled substances, including monitoring INSPECT reports, urine drug screening, and pill counts if necessary. The patient is aware that inappropriate use will results in cessation of prescribing such medications.     INSPECT report has been reviewed and scanned into the patient's chart.     As the clinician, I personally reviewed the INSPECT while the patient was in the office today.     History and physical exam exhibit continued safe and appropriate use of controlled substances.

## 2021-12-02 ENCOUNTER — TELEPHONE (OUTPATIENT)
Dept: FAMILY MEDICINE CLINIC | Facility: CLINIC | Age: 61
End: 2021-12-02

## 2021-12-02 NOTE — TELEPHONE ENCOUNTER
Caller: TIANA VALERIO    Relationship to patient: Emergency Contact    Best call back number: [phone]    Chief complaint: PHYSICAL    Type of visit: PHYSICAL    Requested date: SOMETIME IN January, AFTERNOON APPOINTMENT      If rescheduling, when is the original appointment: 12/13/21     Additional notes:PATIENT'S WIFE CALLED TO RESCHEDULE HER 'S PHYSICAL BECAUSE THEY WILL BE OUT OF TOWN THAT DAY     SHE IS WANTING TO SEE IF SHE COULD SEE HIM SOMETIME IN JANUARY    HUB DID NOT SEE AN AVAILABLE OPENING [address] INTO [place]     WANTING TO SEE IF HE WILL BE ABLE TO RESCHEDULE FOR [facility]LEA OR NEEDS TO DO SOMETHING ELSE

## 2021-12-08 ENCOUNTER — TELEPHONE (OUTPATIENT)
Dept: FAMILY MEDICINE CLINIC | Facility: CLINIC | Age: 61
End: 2021-12-08

## 2021-12-08 DIAGNOSIS — I10 ESSENTIAL HYPERTENSION: ICD-10-CM

## 2021-12-08 RX ORDER — LOSARTAN POTASSIUM 100 MG/1
100 TABLET ORAL DAILY
Qty: 90 TABLET | Refills: 1 | Status: SHIPPED | OUTPATIENT
Start: 2021-12-08 | End: 2022-01-17

## 2021-12-08 NOTE — TELEPHONE ENCOUNTER
Incoming Refill Request      Medication requested (name and dose):   losartan (COZAAR) 100 MG tablet  100 mg, Daily         Pharmacy where request should be sent: [facility] [facility] [place] - [address] [place] [address] DR - [phone]  - [phone]   [phone]    Additional details provided by patient: PATIENT IS CLOSE TO BEING OUT OF MEDICATION    Best call back number: [phone]    Does the patient have less than a 3 day supply:  [x] Yes  [] No    Jm Dubose Rep  12/08/21, 13:14 EST            
donen   
No

## 2021-12-10 RX ORDER — ATORVASTATIN CALCIUM 10 MG/1
10 TABLET, FILM COATED ORAL DAILY
Qty: 90 TABLET | Refills: 1 | Status: SHIPPED | OUTPATIENT
Start: 2021-12-10 | End: 2021-12-14

## 2021-12-10 NOTE — TELEPHONE ENCOUNTER
Caller: TIANA VALERIO    Relationship: Emergency Contact    Best call back number:[phone]    Requested Prescriptions:   Requested Prescriptions     Pending Prescriptions Disp Refills   • atorvastatin (LIPITOR) 10 MG tablet 90 tablet 1     Sig: Take 1 tablet by mouth Daily.        Pharmacy where request should be sent: [facility] [place] [address] - [phone] [facility] [phone] FX       Does the patient have less than a 3 day supply:  [] Yes  [x] No    Jm Reynolds Rep   12/10/21 11:00 EST

## 2021-12-14 RX ORDER — ATORVASTATIN CALCIUM 10 MG/1
10 TABLET, FILM COATED ORAL DAILY
Qty: 90 TABLET | Refills: 1 | Status: SHIPPED | OUTPATIENT
Start: 2021-12-14 | End: 2022-03-07

## 2022-01-12 ENCOUNTER — TELEPHONE (OUTPATIENT)
Dept: FAMILY MEDICINE CLINIC | Facility: CLINIC | Age: 62
End: 2022-01-12

## 2022-01-12 NOTE — TELEPHONE ENCOUNTER
Caller: TIANA VALERIO    Relationship to patient: Emergency Contact    Best call back number: [phone]    Chief complaint: BLOOD PRESSURE RUNNING HIGH, HIGH PSA LEVEL    Type of visit: OFFICE, 30 MINUTES     Requested date: N/A     If rescheduling, when is the original appointment: N/A     Additional notes:PATIENT'S WIFE CALLED AND    SAID HER  ALMOST FAILED HIS DOT PHYSICAL DUE TO A HIGH BLOOD PRESSURE LEVEL     SHE SAID HIS PSA LEVEL ALSO HAS GOTTEN HIGH AGAIN     HE HAS A FYI NEEDING 30 MINUTE VISITS

## 2022-01-12 NOTE — TELEPHONE ENCOUNTER
Spoke with patient's wife and scheduled an appt with [facility] on 1/17/2022 at 8:30am.

## 2022-01-17 ENCOUNTER — OFFICE VISIT (OUTPATIENT)
Dept: FAMILY MEDICINE CLINIC | Facility: CLINIC | Age: 62
End: 2022-01-17

## 2022-01-17 VITALS
TEMPERATURE: 98.6 F | WEIGHT: 313 LBS | BODY MASS INDEX: 43.82 KG/M2 | DIASTOLIC BLOOD PRESSURE: 107 MMHG | HEIGHT: 71 IN | OXYGEN SATURATION: 94 % | RESPIRATION RATE: 24 BRPM | SYSTOLIC BLOOD PRESSURE: 179 MMHG | HEART RATE: 87 BPM

## 2022-01-17 DIAGNOSIS — R97.20 ELEVATED PSA: ICD-10-CM

## 2022-01-17 DIAGNOSIS — I10 PRIMARY HYPERTENSION: Primary | ICD-10-CM

## 2022-01-17 PROCEDURE — 99214 OFFICE O/P EST MOD 30 MIN: CPT | Performed by: NURSE PRACTITIONER

## 2022-01-17 RX ORDER — LOSARTAN POTASSIUM AND HYDROCHLOROTHIAZIDE 25; 100 MG/1; MG/1
1 TABLET ORAL DAILY
Qty: 90 TABLET | Refills: 0 | Status: SHIPPED | OUTPATIENT
Start: 2022-01-17 | End: 2022-04-19

## 2022-01-17 RX ORDER — AMLODIPINE BESYLATE 2.5 MG/1
2.5 TABLET ORAL DAILY
Qty: 30 TABLET | Refills: 5 | Status: SHIPPED | OUTPATIENT
Start: 2022-01-17 | End: 2022-06-15 | Stop reason: SDUPTHER

## 2022-01-17 NOTE — PROGRESS NOTES
"Chief Complaint  Hypertension and Elevated PSA  Subjective        Chi Ramirez II presents to [facility]  Pt comes in today with c/o elevated BP.   [facility] and BP was high at 184/100. Has been running high when he checks it at home.   States he has been waking up with HA's, but feels it is result of trying to wean off pain meds.   [facility]. Concerned about elevated PSA. However, it is down from last April. Has seen Dr. Hendrix in the past.        Objective     Vital Signs:   BP (!) 179/107 (BP Location: Left arm, Patient Position: Sitting, Cuff Size: Large Adult)   Pulse 87   Temp 98.6 °F (37 °C) (Temporal)   Resp 24   Ht 180.3 cm (71\")   Wt (!) 142 kg (313 lb)   SpO2 94%   BMI 43.65 kg/m²       BP Readings from Last 3 Encounters:   01/17/22 (!) 179/107   11/29/21 (!) 182/87   08/11/21 142/84       Wt Readings from Last 3 Encounters:   01/17/22 (!) 142 kg (313 lb)   11/29/21 (!) 138 kg (305 lb)   08/11/21 (!) 138 kg (304 lb)     Physical Exam  Constitutional:       Appearance: He is well-developed. He is obese.   Eyes:      Pupils: Pupils are equal, round, and reactive to light.   Cardiovascular:      Rate and Rhythm: Normal rate and regular rhythm.   Pulmonary:      Effort: Pulmonary effort is normal.      Breath sounds: Normal breath sounds.   Neurological:      Mental Status: He is alert and oriented to person, place, and time.        Result Review :                 Assessment and Plan    Diagnoses and all orders for this visit:    1. Primary hypertension (Primary)  Assessment & Plan:  Hypertension is worsening.  Dietary sodium restriction.  Weight loss.  Regular aerobic exercise.  Stop smoking.  Medication changes per orders.  Blood pressure will be reassessed in 4 weeks.  Repeat /100. Med changes to include add HCTZ to losartan and add amlodipine     Orders:  -     losartan-hydrochlorothiazide (Hyzaar) 100-25 " MG per tablet; Take 1 tablet by mouth Daily.  Dispense: 90 tablet; Refill: 0  -     amLODIPine (NORVASC) 2.5 MG tablet; Take 1 tablet by mouth Daily for 180 days.  Dispense: 30 tablet; Refill: 5    2. Elevated PSA  -     Ambulatory Referral to Urology  add hctz to losartan  Add amlodipine  Referral to urology  During this office visit, we discussed the pertinent aspects of the visit and treatment recommendations. Pt verbalizes understanding. Follow up was discussed. Patient was given the opportunity to ask questions and discuss other concerns.   Discussed importance of regular exercise and recommended starting or continuing a regular exercise program for good health. The patient was also encouraged to lose weight for better health.         Follow Up   Return in about 1 month (around 2/17/2022) for Annual physical.  Patient was given instructions and counseling regarding his condition or for health maintenance advice. Please see specific information pulled into the AVS if appropriate.

## 2022-02-07 ENCOUNTER — OFFICE VISIT (OUTPATIENT)
Dept: PAIN MEDICINE | Facility: CLINIC | Age: 62
End: 2022-02-07

## 2022-02-07 VITALS
HEIGHT: 71 IN | HEART RATE: 86 BPM | RESPIRATION RATE: 16 BRPM | DIASTOLIC BLOOD PRESSURE: 84 MMHG | TEMPERATURE: 97.1 F | BODY MASS INDEX: 43.4 KG/M2 | WEIGHT: 310 LBS | OXYGEN SATURATION: 94 % | SYSTOLIC BLOOD PRESSURE: 133 MMHG

## 2022-02-07 DIAGNOSIS — M25.562 CHRONIC PAIN OF LEFT KNEE: ICD-10-CM

## 2022-02-07 DIAGNOSIS — G89.29 CHRONIC PAIN OF LEFT KNEE: ICD-10-CM

## 2022-02-07 DIAGNOSIS — M54.42 CHRONIC MIDLINE LOW BACK PAIN WITH LEFT-SIDED SCIATICA: ICD-10-CM

## 2022-02-07 DIAGNOSIS — M79.605 PAIN IN BOTH LOWER EXTREMITIES: ICD-10-CM

## 2022-02-07 DIAGNOSIS — M25.551 BILATERAL HIP PAIN: ICD-10-CM

## 2022-02-07 DIAGNOSIS — G89.29 CHRONIC MIDLINE LOW BACK PAIN WITH LEFT-SIDED SCIATICA: ICD-10-CM

## 2022-02-07 DIAGNOSIS — M47.816 SPONDYLOSIS OF LUMBAR REGION WITHOUT MYELOPATHY OR RADICULOPATHY: ICD-10-CM

## 2022-02-07 DIAGNOSIS — M25.552 BILATERAL HIP PAIN: ICD-10-CM

## 2022-02-07 DIAGNOSIS — G89.29 CHRONIC MIDLINE LOW BACK PAIN WITHOUT SCIATICA: Primary | ICD-10-CM

## 2022-02-07 DIAGNOSIS — M54.50 CHRONIC MIDLINE LOW BACK PAIN WITHOUT SCIATICA: Primary | ICD-10-CM

## 2022-02-07 DIAGNOSIS — M79.604 PAIN IN BOTH LOWER EXTREMITIES: ICD-10-CM

## 2022-02-07 PROCEDURE — 99214 OFFICE O/P EST MOD 30 MIN: CPT | Performed by: PHYSICAL MEDICINE & REHABILITATION

## 2022-02-07 PROCEDURE — G0463 HOSPITAL OUTPT CLINIC VISIT: HCPCS | Performed by: PHYSICAL MEDICINE & REHABILITATION

## 2022-02-07 RX ORDER — HYDROCODONE BITARTRATE AND ACETAMINOPHEN 10; 325 MG/1; MG/1
1 TABLET ORAL EVERY 6 HOURS PRN
Qty: 120 TABLET | Refills: 0 | Status: SHIPPED | OUTPATIENT
Start: 2022-02-07 | End: 2022-03-01

## 2022-02-07 RX ORDER — HYDROCODONE BITARTRATE AND ACETAMINOPHEN 10; 325 MG/1; MG/1
1 TABLET ORAL EVERY 6 HOURS PRN
Qty: 120 TABLET | Refills: 0 | Status: SHIPPED | OUTPATIENT
Start: 2022-02-07 | End: 2022-06-03 | Stop reason: SDUPTHER

## 2022-02-07 RX ORDER — HYDROCODONE BITARTRATE AND ACETAMINOPHEN 10; 325 MG/1; MG/1
1 TABLET ORAL EVERY 6 HOURS PRN
Qty: 120 TABLET | Refills: 0 | Status: SHIPPED | OUTPATIENT
Start: 2022-02-07 | End: 2022-05-09 | Stop reason: SDUPTHER

## 2022-02-07 NOTE — PROGRESS NOTES
Subjective   Chi Ramirez II is a 61 y.o. male.     Chronic low back pain, nonradiating, also left knee pain and b/l hip pain, 6/10 at worst, 3/10 at best, always present, varies, began 2018 with workplace injury, burning, tingling, worse with standing and walking, interferes with ADLs, sleep, failed PT. 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 Saw PCP, notes reviewed, as above, with referral for pain management, stable on Norco 10mg TID prn, no red flags notes. No FH of substance abuse.        The following portions of the patient's history were reviewed and updated as appropriate: allergies, current medications, past family history, past medical history, past social history, past surgical history and problem list.    Review of Systems   Constitutional: Positive for fatigue. Negative for chills and fever.   HENT: Positive for hearing loss. Negative for trouble swallowing.    Eyes: Negative for visual disturbance.   Respiratory: Positive for shortness of breath.    Cardiovascular: Negative for chest pain.   Gastrointestinal: Positive for constipation. Negative for abdominal pain, diarrhea, nausea and vomiting.   Genitourinary: Negative for urinary incontinence.   Musculoskeletal: Positive for arthralgias, back pain and joint swelling. Negative for myalgias and neck pain.   Neurological: Positive for headache. Negative for dizziness, weakness and numbness.       Objective   Physical Exam  Constitutional:       Appearance: Normal appearance. He is well-developed.   HENT:      Head: Normocephalic and atraumatic.   Eyes:      Extraocular Movements: Extraocular movements intact.      Pupils: Pupils are equal, round, and reactive to light.   Cardiovascular:      Rate and Rhythm: Normal rate and regular rhythm.      Heart sounds: Normal heart sounds.   Pulmonary:      Effort: Pulmonary effort is normal.      Breath sounds: Normal breath sounds.   Abdominal:      General: Bowel sounds are normal. There is no  distension.      Palpations: Abdomen is soft.      Tenderness: There is no abdominal tenderness.   Musculoskeletal:      Cervical back: Normal range of motion.      Comments: Pain in b/l groin with FADIR   Neurological:      Mental Status: He is alert and oriented to person, place, and time.      Sensory: No sensory deficit.      Deep Tendon Reflexes: Reflexes are normal and symmetric.   Psychiatric:         Mood and Affect: Mood normal.         Behavior: Behavior normal.         Thought Content: Thought content normal.         Judgment: Judgment normal.           Assessment/Plan   Diagnoses and all orders for this visit:    1. Chronic midline low back pain without sciatica (Primary)    2. Chronic pain of left knee    3. Spondylosis of lumbar region without myelopathy or radiculopathy    4. Bilateral hip pain      UDS in order 11/6/21.  Discussed risks and benefits of opioid treatment for chonic pain with patient, including expectations related to prescription requests, alternative modalities to opioids for managing pain, her treatment plan, risks of dependency and addiction, and safe storage practices for prescribed opioids, as well as proper and improper disposal of all medications.  Treatment plan will consist of continuing current medication as long as it remains effective and is necessary, while evaluating patient at each visit and determining if the medication can be lowered or discontinued, while also using nonopioid therapies to reduce reliance on opioids.  Increase to Norco 10mg QID prn. Filled 1/6/22.  Discussed MBB and RFA of b/l L3-5 facet joints in depth, he will consider it.  May inject L knee, b/l hips in future.  RTC in 3 months with wife.      INSPECT REPORT     As part of the patient's treatment plan, I am prescribing controlled substances. The patient has been made aware of appropriate use of such medications, including potential risk of somnolence, limited ability to drive and/or work safely, and  the potential for dependence or overdose. It has also bee made clear that these medications are for use by this patient only, without concomitant use of alcohol or other substances unless prescribed.      Patient has completed prescribing agreement detailing terms of continued prescribing of controlled substances, including monitoring INSPECT reports, urine drug screening, and pill counts if necessary. The patient is aware that inappropriate use will results in cessation of prescribing such medications.     INSPECT report has been reviewed and scanned into the patient's chart.     As the clinician, I personally reviewed the INSPECT while the patient was in the office today.     History and physical exam exhibit continued safe and appropriate use of controlled substances.

## 2022-02-16 RX ORDER — ERGOCALCIFEROL 1.25 MG/1
CAPSULE ORAL
Qty: 12 CAPSULE | Refills: 0 | Status: SHIPPED | OUTPATIENT
Start: 2022-02-16 | End: 2022-05-03

## 2022-03-01 ENCOUNTER — OFFICE VISIT (OUTPATIENT)
Dept: FAMILY MEDICINE CLINIC | Facility: CLINIC | Age: 62
End: 2022-03-01

## 2022-03-01 VITALS
HEIGHT: 71 IN | WEIGHT: 315 LBS | HEART RATE: 67 BPM | SYSTOLIC BLOOD PRESSURE: 138 MMHG | RESPIRATION RATE: 18 BRPM | TEMPERATURE: 97.7 F | DIASTOLIC BLOOD PRESSURE: 76 MMHG | BODY MASS INDEX: 44.1 KG/M2 | OXYGEN SATURATION: 96 %

## 2022-03-01 DIAGNOSIS — L98.9 LESION OF SKIN OF SCALP: ICD-10-CM

## 2022-03-01 DIAGNOSIS — Z11.59 NEED FOR HEPATITIS C SCREENING TEST: ICD-10-CM

## 2022-03-01 DIAGNOSIS — R97.20 ELEVATED PSA: ICD-10-CM

## 2022-03-01 DIAGNOSIS — Z00.00 PREVENTATIVE HEALTH CARE: Primary | ICD-10-CM

## 2022-03-01 DIAGNOSIS — Z72.0 TOBACCO ABUSE: ICD-10-CM

## 2022-03-01 PROCEDURE — 99213 OFFICE O/P EST LOW 20 MIN: CPT | Performed by: NURSE PRACTITIONER

## 2022-03-01 PROCEDURE — 99396 PREV VISIT EST AGE 40-64: CPT | Performed by: NURSE PRACTITIONER

## 2022-03-01 RX ORDER — ASPIRIN 81 MG/1
81 TABLET ORAL DAILY
COMMUNITY

## 2022-03-01 RX ORDER — MULTIPLE VITAMINS W/ MINERALS TAB 9MG-400MCG
1 TAB ORAL DAILY
COMMUNITY

## 2022-03-01 NOTE — PROGRESS NOTES
"Chief Complaint  Annual Exam  Subjective        Chi Ramirez II presents to [facility]  Pt comes in today for routine physical.   At last appt I had added HCTZ to losartan. Also taking norvasc and metoprolol. Not checking BP at home. Denies any CP, SOA, palpitations, dizziness, or HA's.     His PSA was elevated and I had referred him to urology, but he never heard from them.    Has a lesion on top of head. Seems to be getting bigger. No itching. No bleeding.        Objective     Vital Signs:   /76   Pulse 67   Temp 97.7 °F (36.5 °C)   Resp 18   Ht 180.3 cm (71\")   Wt (!) 143 kg (315 lb)   SpO2 96%   BMI 43.93 kg/m²       BP Readings from Last 3 Encounters:   03/01/22 138/76   02/07/22 133/84   01/17/22 (!) 179/107       Wt Readings from Last 3 Encounters:   03/01/22 (!) 143 kg (315 lb)   02/07/22 (!) 141 kg (310 lb)   01/17/22 (!) 142 kg (313 lb)     Physical Exam  Constitutional:       Appearance: He is well-developed. He is obese.   HENT:      Head: Normocephalic.   Eyes:      Conjunctiva/sclera: Conjunctivae normal.      Pupils: Pupils are equal, round, and reactive to light.   Neck:      Thyroid: No thyromegaly.   Cardiovascular:      Rate and Rhythm: Normal rate and regular rhythm.      Heart sounds: No murmur heard.      Pulmonary:      Effort: Pulmonary effort is normal.      Breath sounds: Normal breath sounds.   Abdominal:      General: Bowel sounds are normal.      Palpations: Abdomen is soft.      Tenderness: There is no abdominal tenderness.   Musculoskeletal:         General: Normal range of motion.      Cervical back: Normal range of motion and neck supple.   Skin:     General: Skin is warm and dry.      Findings: No lesion.   Neurological:      Mental Status: He is alert and oriented to person, place, and time.   Psychiatric:         Behavior: Behavior normal.        Result Review :                 Assessment and Plan    Diagnoses and all orders for " this visit:    1. Preventative health care (Primary)  -     Hepatitis C Antibody; Future  -     Comprehensive Metabolic Panel; Future  -     CBC & Differential; Future  -     Hemoglobin A1c; Future  -     Lipid Panel; Future  -     TSH; Future    2. Elevated PSA  -     Ambulatory Referral to Urology    3. Tobacco abuse  -     CT Chest Low Dose Wo; Future    4. Need for hepatitis C screening test  -     Hepatitis C Antibody; Future    5. Lesion of skin of scalp  -     Ambulatory Referral to Dermatology    referral to urology  Referral to derm   Check labs  Low dose CT scan   Discussed importance of regular exercise and recommended starting or continuing a regular exercise program for good health. The patient was also encouraged to lose weight for better health.   Discussed the hazards of tobacco use. Smoking cessation recommended and techniques and options to hep patient quit were discussed.   During this visit for their annual exam, we reviewed their personal history, social history and family history. We went over their medications and all the recommended health maintenance items for their age group. They were given the opportunity to ask questions and discuss other concerns.         Follow Up   Return in about 6 months (around 9/1/2022).  Patient was given instructions and counseling regarding his condition or for health maintenance advice. Please see specific information pulled into the AVS if appropriate.

## 2022-03-05 ENCOUNTER — LAB (OUTPATIENT)
Dept: LAB | Facility: HOSPITAL | Age: 62
End: 2022-03-05

## 2022-03-05 DIAGNOSIS — Z11.59 NEED FOR HEPATITIS C SCREENING TEST: ICD-10-CM

## 2022-03-05 DIAGNOSIS — Z00.00 PREVENTATIVE HEALTH CARE: ICD-10-CM

## 2022-03-05 LAB
ALBUMIN SERPL-MCNC: 4.1 G/DL (ref 3.5–5.2)
ALBUMIN/GLOB SERPL: 1.6 G/DL
ALP SERPL-CCNC: 79 U/L (ref 39–117)
ALT SERPL W P-5'-P-CCNC: 26 U/L (ref 1–41)
ANION GAP SERPL CALCULATED.3IONS-SCNC: 9 MMOL/L (ref 5–15)
AST SERPL-CCNC: 16 U/L (ref 1–40)
BASOPHILS # BLD AUTO: 0.07 10*3/MM3 (ref 0–0.2)
BASOPHILS NFR BLD AUTO: 0.9 % (ref 0–1.5)
BILIRUB SERPL-MCNC: 0.4 MG/DL (ref 0–1.2)
BUN SERPL-MCNC: 20 MG/DL (ref 8–23)
BUN/CREAT SERPL: 21.1 (ref 7–25)
CALCIUM SPEC-SCNC: 9.4 MG/DL (ref 8.6–10.5)
CHLORIDE SERPL-SCNC: 100 MMOL/L (ref 98–107)
CHOLEST SERPL-MCNC: 201 MG/DL (ref 0–200)
CO2 SERPL-SCNC: 32 MMOL/L (ref 22–29)
CREAT SERPL-MCNC: 0.95 MG/DL (ref 0.76–1.27)
DEPRECATED RDW RBC AUTO: 43.4 FL (ref 37–54)
EGFRCR SERPLBLD CKD-EPI 2021: 91.1 ML/MIN/1.73
EOSINOPHIL # BLD AUTO: 0.27 10*3/MM3 (ref 0–0.4)
EOSINOPHIL NFR BLD AUTO: 3.4 % (ref 0.3–6.2)
ERYTHROCYTE [DISTWIDTH] IN BLOOD BY AUTOMATED COUNT: 12.5 % (ref 12.3–15.4)
GLOBULIN UR ELPH-MCNC: 2.5 GM/DL
GLUCOSE SERPL-MCNC: 85 MG/DL (ref 65–99)
HCT VFR BLD AUTO: 51.3 % (ref 37.5–51)
HCV AB SER DONR QL: NORMAL
HDLC SERPL-MCNC: 43 MG/DL (ref 40–60)
HGB BLD-MCNC: 17.5 G/DL (ref 13–17.7)
IMM GRANULOCYTES # BLD AUTO: 0.03 10*3/MM3 (ref 0–0.05)
IMM GRANULOCYTES NFR BLD AUTO: 0.4 % (ref 0–0.5)
LDLC SERPL CALC-MCNC: 139 MG/DL (ref 0–100)
LDLC/HDLC SERPL: 3.19 {RATIO}
LYMPHOCYTES # BLD AUTO: 2.5 10*3/MM3 (ref 0.7–3.1)
LYMPHOCYTES NFR BLD AUTO: 31.5 % (ref 19.6–45.3)
MCH RBC QN AUTO: 32.4 PG (ref 26.6–33)
MCHC RBC AUTO-ENTMCNC: 34.1 G/DL (ref 31.5–35.7)
MCV RBC AUTO: 95 FL (ref 79–97)
MONOCYTES # BLD AUTO: 0.74 10*3/MM3 (ref 0.1–0.9)
MONOCYTES NFR BLD AUTO: 9.3 % (ref 5–12)
NEUTROPHILS NFR BLD AUTO: 4.33 10*3/MM3 (ref 1.7–7)
NEUTROPHILS NFR BLD AUTO: 54.5 % (ref 42.7–76)
NRBC BLD AUTO-RTO: 0 /100 WBC (ref 0–0.2)
PLATELET # BLD AUTO: 176 10*3/MM3 (ref 140–450)
PMV BLD AUTO: 10.5 FL (ref 6–12)
POTASSIUM SERPL-SCNC: 4.3 MMOL/L (ref 3.5–5.2)
PROT SERPL-MCNC: 6.6 G/DL (ref 6–8.5)
RBC # BLD AUTO: 5.4 10*6/MM3 (ref 4.14–5.8)
SODIUM SERPL-SCNC: 141 MMOL/L (ref 136–145)
TRIGL SERPL-MCNC: 105 MG/DL (ref 0–150)
TSH SERPL DL<=0.05 MIU/L-ACNC: 1.08 UIU/ML (ref 0.27–4.2)
VLDLC SERPL-MCNC: 19 MG/DL (ref 5–40)
WBC NRBC COR # BLD: 7.94 10*3/MM3 (ref 3.4–10.8)

## 2022-03-05 PROCEDURE — 84443 ASSAY THYROID STIM HORMONE: CPT

## 2022-03-05 PROCEDURE — 80053 COMPREHEN METABOLIC PANEL: CPT

## 2022-03-05 PROCEDURE — 85025 COMPLETE CBC W/AUTO DIFF WBC: CPT

## 2022-03-05 PROCEDURE — 83036 HEMOGLOBIN GLYCOSYLATED A1C: CPT

## 2022-03-05 PROCEDURE — 80061 LIPID PANEL: CPT

## 2022-03-05 PROCEDURE — 36415 COLL VENOUS BLD VENIPUNCTURE: CPT

## 2022-03-05 PROCEDURE — 86803 HEPATITIS C AB TEST: CPT

## 2022-03-07 DIAGNOSIS — I10 ESSENTIAL HYPERTENSION: Primary | ICD-10-CM

## 2022-03-07 DIAGNOSIS — E78.2 MIXED HYPERLIPIDEMIA: ICD-10-CM

## 2022-03-07 DIAGNOSIS — E11.43 TYPE 2 DIABETES MELLITUS WITH DIABETIC AUTONOMIC NEUROPATHY, WITHOUT LONG-TERM CURRENT USE OF INSULIN: ICD-10-CM

## 2022-03-07 LAB — HBA1C MFR BLD: 5.9 % (ref 3.5–5.6)

## 2022-03-07 RX ORDER — METFORMIN HYDROCHLORIDE 500 MG/1
500 TABLET, EXTENDED RELEASE ORAL
Qty: 90 TABLET | Refills: 1 | Status: SHIPPED | OUTPATIENT
Start: 2022-03-07 | End: 2022-10-23

## 2022-03-07 RX ORDER — ATORVASTATIN CALCIUM 20 MG/1
20 TABLET, FILM COATED ORAL DAILY
Qty: 90 TABLET | Refills: 2 | Status: SHIPPED | OUTPATIENT
Start: 2022-03-07 | End: 2023-02-02

## 2022-03-07 NOTE — PROGRESS NOTES
1. A1C went up to 5.9 (prediabetes). I would like to start metformin ER once daily to help improve this. Also needs to work on diet and try to avoid sweets and carbs.   2. Lipids are not at goal. I will increase lipitor to 20mg daily  I would like to repeat labs in 6 months: CMP, lipid, A1C

## 2022-03-11 ENCOUNTER — HOSPITAL ENCOUNTER (OUTPATIENT)
Dept: CT IMAGING | Facility: HOSPITAL | Age: 62
Discharge: HOME | End: 2022-03-11
Admitting: NURSE PRACTITIONER

## 2022-03-11 DIAGNOSIS — Z72.0 TOBACCO ABUSE: ICD-10-CM

## 2022-03-11 PROCEDURE — 71271 CT THORAX LUNG CANCER SCR C-: CPT

## 2022-04-19 DIAGNOSIS — I10 PRIMARY HYPERTENSION: ICD-10-CM

## 2022-04-19 RX ORDER — LOSARTAN POTASSIUM AND HYDROCHLOROTHIAZIDE 25; 100 MG/1; MG/1
TABLET ORAL
Qty: 90 TABLET | Refills: 0 | Status: SHIPPED | OUTPATIENT
Start: 2022-04-19 | End: 2022-04-21

## 2022-04-21 DIAGNOSIS — I10 PRIMARY HYPERTENSION: ICD-10-CM

## 2022-04-21 RX ORDER — LOSARTAN POTASSIUM AND HYDROCHLOROTHIAZIDE 25; 100 MG/1; MG/1
TABLET ORAL
Qty: 90 TABLET | Refills: 0 | Status: SHIPPED | OUTPATIENT
Start: 2022-04-21 | End: 2022-10-28 | Stop reason: SDUPTHER

## 2022-05-03 RX ORDER — ERGOCALCIFEROL 1.25 MG/1
CAPSULE ORAL
Qty: 12 CAPSULE | Refills: 0 | Status: SHIPPED | OUTPATIENT
Start: 2022-05-03 | End: 2022-09-06

## 2022-05-09 DIAGNOSIS — G89.29 CHRONIC MIDLINE LOW BACK PAIN WITH LEFT-SIDED SCIATICA: ICD-10-CM

## 2022-05-09 DIAGNOSIS — M54.42 CHRONIC MIDLINE LOW BACK PAIN WITH LEFT-SIDED SCIATICA: ICD-10-CM

## 2022-05-09 DIAGNOSIS — M79.604 PAIN IN BOTH LOWER EXTREMITIES: ICD-10-CM

## 2022-05-09 DIAGNOSIS — M79.605 PAIN IN BOTH LOWER EXTREMITIES: ICD-10-CM

## 2022-05-09 RX ORDER — HYDROCODONE BITARTRATE AND ACETAMINOPHEN 10; 325 MG/1; MG/1
1 TABLET ORAL EVERY 6 HOURS PRN
Qty: 120 TABLET | Refills: 0 | Status: SHIPPED | OUTPATIENT
Start: 2022-05-09 | End: 2022-06-03 | Stop reason: SDUPTHER

## 2022-05-09 NOTE — TELEPHONE ENCOUNTER
Inspect reviewed, sent, luckily this is the very last day I could have sent this, and only because Feb only has 28 days.

## 2022-06-03 ENCOUNTER — OFFICE VISIT (OUTPATIENT)
Dept: PAIN MEDICINE | Facility: CLINIC | Age: 62
End: 2022-06-03

## 2022-06-03 VITALS
RESPIRATION RATE: 16 BRPM | DIASTOLIC BLOOD PRESSURE: 79 MMHG | HEART RATE: 63 BPM | HEIGHT: 71 IN | SYSTOLIC BLOOD PRESSURE: 128 MMHG | OXYGEN SATURATION: 90 % | WEIGHT: 315 LBS | BODY MASS INDEX: 44.1 KG/M2

## 2022-06-03 DIAGNOSIS — M54.42 CHRONIC MIDLINE LOW BACK PAIN WITH LEFT-SIDED SCIATICA: ICD-10-CM

## 2022-06-03 DIAGNOSIS — M54.50 CHRONIC MIDLINE LOW BACK PAIN WITHOUT SCIATICA: Primary | ICD-10-CM

## 2022-06-03 DIAGNOSIS — M47.816 SPONDYLOSIS OF LUMBAR REGION WITHOUT MYELOPATHY OR RADICULOPATHY: ICD-10-CM

## 2022-06-03 DIAGNOSIS — G89.29 CHRONIC MIDLINE LOW BACK PAIN WITH LEFT-SIDED SCIATICA: ICD-10-CM

## 2022-06-03 DIAGNOSIS — M79.604 PAIN IN BOTH LOWER EXTREMITIES: ICD-10-CM

## 2022-06-03 DIAGNOSIS — M25.562 CHRONIC PAIN OF LEFT KNEE: ICD-10-CM

## 2022-06-03 DIAGNOSIS — G89.29 CHRONIC PAIN OF LEFT KNEE: ICD-10-CM

## 2022-06-03 DIAGNOSIS — M79.605 PAIN IN BOTH LOWER EXTREMITIES: ICD-10-CM

## 2022-06-03 DIAGNOSIS — G89.29 CHRONIC MIDLINE LOW BACK PAIN WITHOUT SCIATICA: Primary | ICD-10-CM

## 2022-06-03 PROCEDURE — 99213 OFFICE O/P EST LOW 20 MIN: CPT | Performed by: PHYSICAL MEDICINE & REHABILITATION

## 2022-06-03 RX ORDER — HYDROCODONE BITARTRATE AND ACETAMINOPHEN 10; 325 MG/1; MG/1
1 TABLET ORAL EVERY 6 HOURS PRN
Qty: 120 TABLET | Refills: 0 | Status: SHIPPED | OUTPATIENT
Start: 2022-06-03 | End: 2022-08-26 | Stop reason: SDUPTHER

## 2022-06-03 NOTE — PROGRESS NOTES
Subjective   Chi Ramirez II is a 61 y.o. male.     Chronic low back pain, nonradiating, also left knee pain and b/l hip pain, 6/10 at worst, 3/10 at best, always present, varies, began 2018 with workplace injury, burning, tingling, worse with standing and walking, interferes with ADLs, sleep, failed PT. 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 Saw PCP, notes reviewed, as above, with referral for pain management, stable on Norco 10mg TID prn, no red flags notes. No FH of substance abuse.        The following portions of the patient's history were reviewed and updated as appropriate: allergies, current medications, past family history, past medical history, past social history, past surgical history and problem list.    Review of Systems   Constitutional: Positive for fatigue. Negative for chills and fever.   HENT: Positive for hearing loss. Negative for trouble swallowing.    Eyes: Negative for visual disturbance.   Respiratory: Positive for shortness of breath.    Cardiovascular: Negative for chest pain.   Gastrointestinal: Positive for constipation. Negative for abdominal pain, diarrhea, nausea and vomiting.   Genitourinary: Negative for urinary incontinence.   Musculoskeletal: Positive for arthralgias, back pain and joint swelling. Negative for myalgias and neck pain.   Neurological: Positive for headache. Negative for dizziness, weakness and numbness.       Objective   Physical Exam  Constitutional:       Appearance: Normal appearance. He is well-developed.   HENT:      Head: Normocephalic and atraumatic.   Eyes:      Extraocular Movements: Extraocular movements intact.      Pupils: Pupils are equal, round, and reactive to light.   Cardiovascular:      Rate and Rhythm: Normal rate and regular rhythm.      Heart sounds: Normal heart sounds.   Pulmonary:      Effort: Pulmonary effort is normal.      Breath sounds: Normal breath sounds.   Abdominal:      General: Bowel sounds are normal. There is no  distension.      Palpations: Abdomen is soft.      Tenderness: There is no abdominal tenderness.   Musculoskeletal:      Cervical back: Normal range of motion.      Comments: Pain in b/l groin with FADIR   Neurological:      Mental Status: He is alert and oriented to person, place, and time.      Sensory: No sensory deficit.      Deep Tendon Reflexes: Reflexes are normal and symmetric.   Psychiatric:         Mood and Affect: Mood normal.         Behavior: Behavior normal.         Thought Content: Thought content normal.         Judgment: Judgment normal.           Assessment & Plan   Diagnoses and all orders for this visit:    1. Chronic midline low back pain without sciatica (Primary)    2. Chronic pain of left knee    3. Pain in both lower extremities    4. Spondylosis of lumbar region without myelopathy or radiculopathy      UDS in order 11/6/21.  Discussed risks and benefits of opioid treatment for chonic pain with patient, including expectations related to prescription requests, alternative modalities to opioids for managing pain, her treatment plan, risks of dependency and addiction, and safe storage practices for prescribed opioids, as well as proper and improper disposal of all medications.  Treatment plan will consist of continuing current medication as long as it remains effective and is necessary, while evaluating patient at each visit and determining if the medication can be lowered or discontinued, while also using nonopioid therapies to reduce reliance on opioids.  Increased to Norco 10mg QID prn, doing well now.  Constipation controlled with OTC stool softener.  Discussed MBB and RFA of b/l L3-5 facet joints in depth, he will consider it.  May inject L knee, b/l hips in future.  RTC in 3 months for f/u.      INSPECT REPORT     As part of the patient's treatment plan, I am prescribing controlled substances. The patient has been made aware of appropriate use of such medications, including potential risk  of somnolence, limited ability to drive and/or work safely, and the potential for dependence or overdose. It has also bee made clear that these medications are for use by this patient only, without concomitant use of alcohol or other substances unless prescribed.      Patient has completed prescribing agreement detailing terms of continued prescribing of controlled substances, including monitoring INSPECT reports, urine drug screening, and pill counts if necessary. The patient is aware that inappropriate use will results in cessation of prescribing such medications.     INSPECT report has been reviewed and scanned into the patient's chart.     As the clinician, I personally reviewed the INSPECT while the patient was in the office today.     History and physical exam exhibit continued safe and appropriate use of controlled substances.

## 2022-06-15 DIAGNOSIS — I10 PRIMARY HYPERTENSION: ICD-10-CM

## 2022-06-15 NOTE — TELEPHONE ENCOUNTER
Caller: TIANA VALERIO    Relationship: Emergency Contact    Best call back number: [phone]    Requested Prescriptions:   Requested Prescriptions     Pending Prescriptions Disp Refills   • amLODIPine (NORVASC) 2.5 MG tablet 30 tablet 5     Sig: Take 1 tablet by mouth Daily for 180 days.        Pharmacy where request should be sent: [facility], [place] - [address] - [phone]  - [phone]     Additional details provided by patient: REQUESTING A 90 DAY SUPPLY    Does the patient have less than a 3 day supply:  [] Yes  [] No    Jm [employer] Rep   06/15/22 15:10 EDT

## 2022-06-16 RX ORDER — AMLODIPINE BESYLATE 2.5 MG/1
2.5 TABLET ORAL DAILY
Qty: 30 TABLET | Refills: 5 | Status: SHIPPED | OUTPATIENT
Start: 2022-06-16 | End: 2023-02-02

## 2022-07-23 DIAGNOSIS — M79.605 PAIN IN BOTH LOWER EXTREMITIES: ICD-10-CM

## 2022-07-23 DIAGNOSIS — M54.42 CHRONIC MIDLINE LOW BACK PAIN WITH LEFT-SIDED SCIATICA: ICD-10-CM

## 2022-07-23 DIAGNOSIS — M79.604 PAIN IN BOTH LOWER EXTREMITIES: ICD-10-CM

## 2022-07-23 DIAGNOSIS — G89.29 CHRONIC MIDLINE LOW BACK PAIN WITH LEFT-SIDED SCIATICA: ICD-10-CM

## 2022-07-25 RX ORDER — MELOXICAM 15 MG/1
TABLET ORAL
Qty: 90 TABLET | Refills: 1 | Status: SHIPPED | OUTPATIENT
Start: 2022-07-25 | End: 2022-10-28 | Stop reason: SDUPTHER

## 2022-08-17 RX ORDER — ERGOCALCIFEROL 1.25 MG/1
CAPSULE ORAL
Qty: 12 CAPSULE | Refills: 0 | OUTPATIENT
Start: 2022-08-17

## 2022-08-17 NOTE — TELEPHONE ENCOUNTER
Incoming Refill Request      Medication requested (name and dose):   vitamin D (ERGOCALCIFEROL) 1.25 MG (34403 UT) capsule capsule   0 ordered         Pharmacy where request should be sent: [facility], [place] [address] - [phone]  - [phone]   [phone]    Additional details provided by patient: PATIENT IS OUT OF MEDICATION     Best call back number: [phone]    Does the patient have less than a 3 day supply:  [x] Yes  [] No    Jm Dubose Rep  08/17/22, 10:34 EDT

## 2022-08-26 ENCOUNTER — OFFICE VISIT (OUTPATIENT)
Dept: PAIN MEDICINE | Facility: CLINIC | Age: 62
End: 2022-08-26

## 2022-08-26 VITALS
HEART RATE: 67 BPM | OXYGEN SATURATION: 93 % | SYSTOLIC BLOOD PRESSURE: 121 MMHG | DIASTOLIC BLOOD PRESSURE: 68 MMHG | RESPIRATION RATE: 16 BRPM

## 2022-08-26 DIAGNOSIS — G89.29 CHRONIC MIDLINE LOW BACK PAIN WITHOUT SCIATICA: Primary | ICD-10-CM

## 2022-08-26 DIAGNOSIS — M54.42 CHRONIC MIDLINE LOW BACK PAIN WITH LEFT-SIDED SCIATICA: ICD-10-CM

## 2022-08-26 DIAGNOSIS — M47.816 SPONDYLOSIS OF LUMBAR REGION WITHOUT MYELOPATHY OR RADICULOPATHY: ICD-10-CM

## 2022-08-26 DIAGNOSIS — M46.1 SACROILIITIS: ICD-10-CM

## 2022-08-26 DIAGNOSIS — G89.29 CHRONIC MIDLINE LOW BACK PAIN WITH LEFT-SIDED SCIATICA: ICD-10-CM

## 2022-08-26 DIAGNOSIS — M25.562 CHRONIC PAIN OF LEFT KNEE: ICD-10-CM

## 2022-08-26 DIAGNOSIS — M25.552 BILATERAL HIP PAIN: ICD-10-CM

## 2022-08-26 DIAGNOSIS — G89.29 CHRONIC PAIN OF LEFT KNEE: ICD-10-CM

## 2022-08-26 DIAGNOSIS — M79.604 PAIN IN BOTH LOWER EXTREMITIES: ICD-10-CM

## 2022-08-26 DIAGNOSIS — M25.551 BILATERAL HIP PAIN: ICD-10-CM

## 2022-08-26 DIAGNOSIS — M79.605 PAIN IN BOTH LOWER EXTREMITIES: ICD-10-CM

## 2022-08-26 DIAGNOSIS — M54.50 CHRONIC MIDLINE LOW BACK PAIN WITHOUT SCIATICA: Primary | ICD-10-CM

## 2022-08-26 PROCEDURE — 99214 OFFICE O/P EST MOD 30 MIN: CPT | Performed by: PHYSICAL MEDICINE & REHABILITATION

## 2022-08-26 RX ORDER — HYDROCODONE BITARTRATE AND ACETAMINOPHEN 10; 325 MG/1; MG/1
1 TABLET ORAL EVERY 6 HOURS PRN
Qty: 120 TABLET | Refills: 0 | Status: SHIPPED | OUTPATIENT
Start: 2022-08-26 | End: 2022-09-06

## 2022-08-26 RX ORDER — HYDROCODONE BITARTRATE AND ACETAMINOPHEN 10; 325 MG/1; MG/1
1 TABLET ORAL EVERY 6 HOURS PRN
Qty: 120 TABLET | Refills: 0 | Status: SHIPPED | OUTPATIENT
Start: 2022-08-26 | End: 2022-09-06 | Stop reason: SDUPTHER

## 2022-08-26 RX ORDER — HYDROCODONE BITARTRATE AND ACETAMINOPHEN 10; 325 MG/1; MG/1
1 TABLET ORAL EVERY 6 HOURS PRN
Qty: 120 TABLET | Refills: 0 | Status: SHIPPED | OUTPATIENT
Start: 2022-08-26 | End: 2022-10-28 | Stop reason: SDUPTHER

## 2022-08-26 NOTE — PROGRESS NOTES
Subjective   Chi Ramirez II is a 61 y.o. male.     Chronic low back pain, nonradiating, also left knee pain and b/l hip pain, 6/10 at worst, 3/10 at best, always present, varies, began 2018 with workplace injury, burning, tingling, worse with standing and walking, interferes with ADLs, sleep, failed PT. 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 Saw PCP, notes reviewed, as above, with referral for pain management, stable on Norco 10mg TID prn, no red flags notes. No FH of substance abuse. Increased to Norco 10mg QID prn with better relief. Worsening pain over b/l SIJ radiating to front, worse with standing after sitting, climbing down from truck. Also worsening pain over right CMC joint.       The following portions of the patient's history were reviewed and updated as appropriate: allergies, current medications, past family history, past medical history, past social history, past surgical history and problem list.    Review of Systems   Constitutional: Positive for fatigue. Negative for chills and fever.   HENT: Positive for hearing loss. Negative for trouble swallowing.    Eyes: Negative for visual disturbance.   Respiratory: Positive for shortness of breath.    Cardiovascular: Negative for chest pain.   Gastrointestinal: Positive for constipation. Negative for abdominal pain, diarrhea, nausea and vomiting.   Genitourinary: Negative for urinary incontinence.   Musculoskeletal: Positive for arthralgias, back pain and joint swelling. Negative for myalgias and neck pain.   Neurological: Positive for headache. Negative for dizziness, weakness and numbness.       Objective   Physical Exam  Constitutional:       Appearance: Normal appearance. He is well-developed.   HENT:      Head: Normocephalic and atraumatic.   Eyes:      Extraocular Movements: Extraocular movements intact.      Pupils: Pupils are equal, round, and reactive to light.   Cardiovascular:      Rate and Rhythm: Normal rate and regular  rhythm.      Heart sounds: Normal heart sounds.   Pulmonary:      Effort: Pulmonary effort is normal.      Breath sounds: Normal breath sounds.   Abdominal:      General: Bowel sounds are normal. There is no distension.      Palpations: Abdomen is soft.      Tenderness: There is no abdominal tenderness.   Musculoskeletal:      Cervical back: Normal range of motion.      Comments: Pain in b/l groin with FADIR   Neurological:      Mental Status: He is alert and oriented to person, place, and time.      Sensory: No sensory deficit.      Deep Tendon Reflexes: Reflexes are normal and symmetric.   Psychiatric:         Mood and Affect: Mood normal.         Behavior: Behavior normal.         Thought Content: Thought content normal.         Judgment: Judgment normal.           Assessment & Plan   Diagnoses and all orders for this visit:    1. Chronic midline low back pain without sciatica (Primary)    2. Chronic pain of left knee    3. Pain in both lower extremities    4. Spondylosis of lumbar region without myelopathy or radiculopathy    5. Bilateral hip pain      UDS in order 11/6/21.  Discussed risks and benefits of opioid treatment for chonic pain with patient, including expectations related to prescription requests, alternative modalities to opioids for managing pain, her treatment plan, risks of dependency and addiction, and safe storage practices for prescribed opioids, as well as proper and improper disposal of all medications.  Treatment plan will consist of continuing current medication as long as it remains effective and is necessary, while evaluating patient at each visit and determining if the medication can be lowered or discontinued, while also using nonopioid therapies to reduce reliance on opioids.  Increased to Norco 10mg QID prn, doing well now.  Constipation controlled with OTC stool softener.  Discussed MBB and RFA of b/l L3-5 facet joints in depth, he will consider it.  Schedule b/l SIJ injections.  May  inject L knee, b/l hips in future.  RTC for b/l SIJ injections then in 3 months for f/u.      INSPECT REPORT     As part of the patient's treatment plan, I am prescribing controlled substances. The patient has been made aware of appropriate use of such medications, including potential risk of somnolence, limited ability to drive and/or work safely, and the potential for dependence or overdose. It has also bee made clear that these medications are for use by this patient only, without concomitant use of alcohol or other substances unless prescribed.      Patient has completed prescribing agreement detailing terms of continued prescribing of controlled substances, including monitoring INSPECT reports, urine drug screening, and pill counts if necessary. The patient is aware that inappropriate use will results in cessation of prescribing such medications.     INSPECT report has been reviewed and scanned into the patient's chart.     As the clinician, I personally reviewed the INSPECT while the patient was in the office today.     History and physical exam exhibit continued safe and appropriate use of controlled substances.

## 2022-08-30 ENCOUNTER — TELEPHONE (OUTPATIENT)
Dept: PAIN MEDICINE | Facility: CLINIC | Age: 62
End: 2022-08-30

## 2022-08-30 NOTE — TELEPHONE ENCOUNTER
Caller: TIANA    Relationship to patient: SPOUSE    Best call back number: BEST PHONE NUMBER FOR PATIENT [phone]    Patient is needing: PATIENT'S SPOUSE, TIANA WAS CALLING TO SEE IF PATIENT COULD GET HIS PROCEDURE DONE IN [place] AT THE OFFICE. SHE STATED SHE KNOWS FROM HER PERSONAL EXPERIENCE THAT [facility] CHARGES MORE TO DO PROCEDURES THEN DR. RIZZO DOES IN OFFICE AND PATIENT WOULD LIKE TO GET HIS INJECTION DONE IN OFFICE AT [place] IF POSSIBLE. I ATTEMPTED TO WARM TRANSFER CALL TO THE OFFICE BUT RECEIVED NO ANSWER. TIANA ALSO HAD A CLINICAL QUESTION ABOUT PATIENT'S PROCEDURE WANTING TO KNOW IF HE COULD DRIVE AFTER HIS PROCEDURE OR NOT. THANK YOU!

## 2022-09-06 ENCOUNTER — OFFICE VISIT (OUTPATIENT)
Dept: FAMILY MEDICINE CLINIC | Facility: CLINIC | Age: 62
End: 2022-09-06

## 2022-09-06 ENCOUNTER — LAB (OUTPATIENT)
Dept: FAMILY MEDICINE CLINIC | Facility: CLINIC | Age: 62
End: 2022-09-06

## 2022-09-06 VITALS
BODY MASS INDEX: 42.42 KG/M2 | SYSTOLIC BLOOD PRESSURE: 140 MMHG | HEIGHT: 71 IN | OXYGEN SATURATION: 91 % | WEIGHT: 303 LBS | RESPIRATION RATE: 16 BRPM | HEART RATE: 69 BPM | TEMPERATURE: 97.5 F | DIASTOLIC BLOOD PRESSURE: 78 MMHG

## 2022-09-06 DIAGNOSIS — E78.00 PURE HYPERCHOLESTEROLEMIA: ICD-10-CM

## 2022-09-06 DIAGNOSIS — E11.43 TYPE 2 DIABETES MELLITUS WITH DIABETIC AUTONOMIC NEUROPATHY, WITHOUT LONG-TERM CURRENT USE OF INSULIN: ICD-10-CM

## 2022-09-06 DIAGNOSIS — R73.03 PREDIABETES: Primary | ICD-10-CM

## 2022-09-06 DIAGNOSIS — I10 ESSENTIAL HYPERTENSION: ICD-10-CM

## 2022-09-06 DIAGNOSIS — I10 PRIMARY HYPERTENSION: ICD-10-CM

## 2022-09-06 DIAGNOSIS — E78.2 MIXED HYPERLIPIDEMIA: ICD-10-CM

## 2022-09-06 LAB
ALBUMIN SERPL-MCNC: 3.8 G/DL (ref 3.5–5.2)
ALBUMIN UR-MCNC: <1.2 MG/DL
ALBUMIN/GLOB SERPL: 1.7 G/DL
ALP SERPL-CCNC: 85 U/L (ref 39–117)
ALT SERPL W P-5'-P-CCNC: 22 U/L (ref 1–41)
ANION GAP SERPL CALCULATED.3IONS-SCNC: 6.1 MMOL/L (ref 5–15)
AST SERPL-CCNC: 18 U/L (ref 1–40)
BILIRUB SERPL-MCNC: 0.3 MG/DL (ref 0–1.2)
BUN SERPL-MCNC: 17 MG/DL (ref 8–23)
BUN/CREAT SERPL: 20 (ref 7–25)
CALCIUM SPEC-SCNC: 9 MG/DL (ref 8.6–10.5)
CHLORIDE SERPL-SCNC: 101 MMOL/L (ref 98–107)
CHOLEST SERPL-MCNC: 187 MG/DL (ref 0–200)
CO2 SERPL-SCNC: 31.9 MMOL/L (ref 22–29)
CREAT SERPL-MCNC: 0.85 MG/DL (ref 0.76–1.27)
CREAT UR-MCNC: 201.9 MG/DL
EGFRCR SERPLBLD CKD-EPI 2021: 98.9 ML/MIN/1.73
GLOBULIN UR ELPH-MCNC: 2.3 GM/DL
GLUCOSE SERPL-MCNC: 94 MG/DL (ref 65–99)
HBA1C MFR BLD: 5.9 % (ref 3.5–5.6)
HDLC SERPL-MCNC: 40 MG/DL (ref 40–60)
LDLC SERPL CALC-MCNC: 120 MG/DL (ref 0–100)
LDLC/HDLC SERPL: 2.93 {RATIO}
MICROALBUMIN/CREAT UR: NORMAL MG/G{CREAT}
POTASSIUM SERPL-SCNC: 4.2 MMOL/L (ref 3.5–5.2)
PROT SERPL-MCNC: 6.1 G/DL (ref 6–8.5)
SODIUM SERPL-SCNC: 139 MMOL/L (ref 136–145)
TRIGL SERPL-MCNC: 150 MG/DL (ref 0–150)
VLDLC SERPL-MCNC: 27 MG/DL (ref 5–40)

## 2022-09-06 PROCEDURE — 80053 COMPREHEN METABOLIC PANEL: CPT | Performed by: NURSE PRACTITIONER

## 2022-09-06 PROCEDURE — 36415 COLL VENOUS BLD VENIPUNCTURE: CPT

## 2022-09-06 PROCEDURE — 82570 ASSAY OF URINE CREATININE: CPT | Performed by: NURSE PRACTITIONER

## 2022-09-06 PROCEDURE — 83036 HEMOGLOBIN GLYCOSYLATED A1C: CPT | Performed by: NURSE PRACTITIONER

## 2022-09-06 PROCEDURE — 99214 OFFICE O/P EST MOD 30 MIN: CPT | Performed by: NURSE PRACTITIONER

## 2022-09-06 PROCEDURE — 82043 UR ALBUMIN QUANTITATIVE: CPT | Performed by: NURSE PRACTITIONER

## 2022-09-06 PROCEDURE — 80061 LIPID PANEL: CPT | Performed by: NURSE PRACTITIONER

## 2022-09-06 NOTE — PROGRESS NOTES
"Chief Complaint  Hypertension  Subjective        Chi Ramirez II presents to [facility]  Pt comes in today for routine 6 month follow up on HTN, HPLD, and prediabetes.   HTN: taking amlodipind, metoprolol, and losartan/hctz. Not monitoring BP at home.   Denies any CP, SOA, palpations, dizziness, or HA's.   HPLD: at last appt we had increased his lipitor based on lab results.   Prediabetes: a1c was elevated at 5.9. I had started metformin ER, but hasn't been taking it much. He states he forgot what he was taking it for and stopped it. Has not been making any diet changes.        Objective     Vital Signs:   /78   Pulse 69   Temp 97.5 °F (36.4 °C)   Resp 16   Ht 180.3 cm (71\")   Wt (!) 137 kg (303 lb)   SpO2 91%   BMI 42.26 kg/m²       BP Readings from Last 3 Encounters:   09/06/22 140/78   08/26/22 121/68   06/03/22 128/79       Wt Readings from Last 3 Encounters:   09/06/22 (!) 137 kg (303 lb)   06/03/22 (!) 143 kg (315 lb)   03/01/22 (!) 143 kg (315 lb)     Physical Exam  Constitutional:       Appearance: He is well-developed.   Eyes:      Pupils: Pupils are equal, round, and reactive to light.   Cardiovascular:      Rate and Rhythm: Normal rate and regular rhythm.   Pulmonary:      Effort: Pulmonary effort is normal.      Breath sounds: Normal breath sounds.   Neurological:      Mental Status: He is alert and oriented to person, place, and time.        Result Review :                 Assessment and Plan    Diagnoses and all orders for this visit:    1. Prediabetes (Primary)  Assessment & Plan:  Hasn't been taking metformin. Needs to restart it and work on diet.     Orders:  -     Microalbumin / Creatinine Urine Ratio - Urine, Clean Catch    2. Primary hypertension  Assessment & Plan:  Hypertension is unchanged.  Continue current treatment regimen.  Weight loss.  Regular aerobic exercise.  Stop smoking.  Continue current medications.  Blood pressure will be " reassessed at the next regular appointment.      3. Pure hypercholesterolemia  Assessment & Plan:  Will recheck labs today.     check labs  Discussed importance of regular exercise and recommended starting or continuing a regular exercise program for good health. The patient was also encouraged to lose weight for better health.   Discussed the hazards of tobacco use. Smoking cessation recommended and techniques and options to hep patient quit were discussed.   During this office visit, we discussed the pertinent aspects of the visit and treatment recommendations. Pt verbalizes understanding. Follow up was discussed. Patient was given the opportunity to ask questions and discuss other concerns.         Follow Up   Return in about 6 months (around 3/6/2023) for Annual physical.  Patient was given instructions and counseling regarding his condition or for health maintenance advice. Please see specific information pulled into the AVS if appropriate.

## 2022-09-06 NOTE — ASSESSMENT & PLAN NOTE
Hypertension is unchanged.  Continue current treatment regimen.  Weight loss.  Regular aerobic exercise.  Stop smoking.  Continue current medications.  Blood pressure will be reassessed at the next regular appointment.

## 2022-09-13 ENCOUNTER — HOSPITAL ENCOUNTER (OUTPATIENT)
Dept: PAIN MEDICINE | Facility: HOSPITAL | Age: 62
Discharge: HOME | End: 2022-09-13

## 2022-09-13 VITALS
BODY MASS INDEX: 42.42 KG/M2 | TEMPERATURE: 97.7 F | DIASTOLIC BLOOD PRESSURE: 81 MMHG | OXYGEN SATURATION: 94 % | HEIGHT: 71 IN | SYSTOLIC BLOOD PRESSURE: 113 MMHG | HEART RATE: 69 BPM | WEIGHT: 303 LBS | RESPIRATION RATE: 16 BRPM

## 2022-09-13 DIAGNOSIS — G89.29 CHRONIC MIDLINE LOW BACK PAIN WITHOUT SCIATICA: Primary | ICD-10-CM

## 2022-09-13 DIAGNOSIS — M54.50 CHRONIC MIDLINE LOW BACK PAIN WITHOUT SCIATICA: Primary | ICD-10-CM

## 2022-09-13 DIAGNOSIS — R52 PAIN: ICD-10-CM

## 2022-09-13 DIAGNOSIS — M46.1 SACROILIITIS: ICD-10-CM

## 2022-09-13 PROCEDURE — 25010000002 METHYLPREDNISOLONE PER 40 MG: Performed by: PHYSICAL MEDICINE & REHABILITATION

## 2022-09-13 PROCEDURE — 77003 FLUOROGUIDE FOR SPINE INJECT: CPT

## 2022-09-13 PROCEDURE — 0 IOPAMIDOL 41 % SOLUTION: Performed by: PHYSICAL MEDICINE & REHABILITATION

## 2022-09-13 PROCEDURE — 27096 INJECT SACROILIAC JOINT: CPT | Performed by: PHYSICAL MEDICINE & REHABILITATION

## 2022-09-13 RX ORDER — METHYLPREDNISOLONE ACETATE 40 MG/ML
40 INJECTION, SUSPENSION INTRA-ARTICULAR; INTRALESIONAL; INTRAMUSCULAR; SOFT TISSUE ONCE
Status: COMPLETED | OUTPATIENT
Start: 2022-09-13 | End: 2022-09-13

## 2022-09-13 RX ORDER — BUPIVACAINE HYDROCHLORIDE 2.5 MG/ML
5 INJECTION, SOLUTION EPIDURAL; INFILTRATION; INTRACAUDAL ONCE
Status: COMPLETED | OUTPATIENT
Start: 2022-09-13 | End: 2022-09-13

## 2022-09-13 RX ADMIN — IOPAMIDOL 2 ML: 408 INJECTION, SOLUTION INTRATHECAL at 09:39

## 2022-09-13 RX ADMIN — BUPIVACAINE HYDROCHLORIDE 5 ML: 2.5 INJECTION, SOLUTION EPIDURAL; INFILTRATION; INTRACAUDAL; PERINEURAL at 09:40

## 2022-09-13 RX ADMIN — METHYLPREDNISOLONE ACETATE 40 MG: 40 INJECTION, SUSPENSION INTRA-ARTICULAR; INTRALESIONAL; INTRAMUSCULAR; INTRASYNOVIAL; SOFT TISSUE at 09:40

## 2022-09-13 RX ADMIN — METHYLPREDNISOLONE ACETATE 40 MG: 40 INJECTION, SUSPENSION INTRA-ARTICULAR; INTRALESIONAL; INTRAMUSCULAR; INTRASYNOVIAL; SOFT TISSUE at 09:41

## 2022-09-13 NOTE — PROCEDURES
Procedures     Chronic low back pain, nonradiating, also left knee pain and b/l hip pain, 6/10 at worst, 3/10 at best, always present, varies, began 2018 with workplace injury, burning, tingling, worse with standing and walking, interferes with ADLs, sleep, failed PT. 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 Saw PCP, notes reviewed, as above, with referral for pain management, stable on Norco 10mg TID prn, no red flags notes. No FH of substance abuse. Increased to Norco 10mg QID prn with better relief. Worsening pain over b/l SIJ radiating to front, worse with standing after sitting, climbing down from truck. Also worsening pain over right CMC joint.    UDS in order 11/6/21.  Discussed risks and benefits of opioid treatment for chonic pain with patient, including expectations related to prescription requests, alternative modalities to opioids for managing pain, her treatment plan, risks of dependency and addiction, and safe storage practices for prescribed opioids, as well as proper and improper disposal of all medications.  Treatment plan will consist of continuing current medication as long as it remains effective and is necessary, while evaluating patient at each visit and determining if the medication can be lowered or discontinued, while also using nonopioid therapies to reduce reliance on opioids.  Increased to Norco 10mg QID prn, doing well now.  Constipation controlled with OTC stool softener.  Discussed MBB and RFA of b/l L3-5 facet joints in depth, he will consider it.  Perform b/l SIJ injections.  May inject L knee, b/l hips in future.  RTC in 3 months for f/u.      Sacroiliac Joint Injection    PREOPERATIVE DIAGNOSIS: Sacroilitis    POSTOPERATIVE DIAGNOSIS: Sacroilitis    PROCEDURE PERFORMED:  BILATERAL SACROILIAC JOINT INJECTION    The patient understands the risks and benefits of the procedure and wishes to proceed. The patient was seen in the preoperative area. Patient's consent was  obtained and updated. Vitals were taken. Patient was then brought to the procedure suite and placed in prone position for a sacroiliac joint injection. The appropriate anatomic area was widely prepped with Chloraprep and draped in a sterile fashion. Under fluoroscopic guidance using a contralateral oblique view, a 25 gauge curved tip spinal needle was passed through skin anesthetized with 1% Lidocaine without epinephrine. The needle tip was guided to the lower pole of the joint using fluoroscopy. 1mL of preservative free contrast was injected into the joint to confirm location. A clear outline was obtained and 3 mL of steroid solution containing 2 mL 0.25% bupivacaine, and 1mL 40mg Depomedrol was injected in total.  The procedure was repeated in all respects on the opposite side. The patient tolerated with no belinda-procedural complications.  A sterile dressing was placed over the puncture sites.          INSPECT REPORT     As part of the patient's treatment plan, I am prescribing controlled substances. The patient has been made aware of appropriate use of such medications, including potential risk of somnolence, limited ability to drive and/or work safely, and the potential for dependence or overdose. It has also bee made clear that these medications are for use by this patient only, without concomitant use of alcohol or other substances unless prescribed.      Patient has completed prescribing agreement detailing terms of continued prescribing of controlled substances, including monitoring INSPECT reports, urine drug screening, and pill counts if necessary. The patient is aware that inappropriate use will results in cessation of prescribing such medications.     INSPECT report has been reviewed and scanned into the patient's chart.     As the clinician, I personally reviewed the INSPECT while the patient was in the office today.     History and physical exam exhibit continued safe and appropriate use of controlled  substances.

## 2022-09-14 ENCOUNTER — TELEPHONE (OUTPATIENT)
Dept: PAIN MEDICINE | Facility: HOSPITAL | Age: 62
End: 2022-09-14

## 2022-09-14 NOTE — TELEPHONE ENCOUNTER
Attempted post procedure phone call but no answer.  Message left on answering machine to call us with any questions or concerns.

## 2022-10-23 ENCOUNTER — HOSPITAL ENCOUNTER (INPATIENT)
Facility: HOSPITAL | Age: 62
LOS: 3 days | Discharge: HOME | End: 2022-10-26
Attending: EMERGENCY MEDICINE

## 2022-10-23 ENCOUNTER — APPOINTMENT (OUTPATIENT)
Dept: GENERAL RADIOLOGY | Facility: HOSPITAL | Age: 62
End: 2022-10-23

## 2022-10-23 DIAGNOSIS — J18.9 PNEUMONIA OF BOTH LUNGS DUE TO INFECTIOUS ORGANISM, UNSPECIFIED PART OF LUNG: ICD-10-CM

## 2022-10-23 DIAGNOSIS — Z92.29 COVID-19 VACCINE SERIES COMPLETED: ICD-10-CM

## 2022-10-23 DIAGNOSIS — R06.00 DYSPNEA, UNSPECIFIED TYPE: Primary | ICD-10-CM

## 2022-10-23 DIAGNOSIS — J96.01 ACUTE RESPIRATORY FAILURE WITH HYPOXIA AND HYPERCAPNIA: ICD-10-CM

## 2022-10-23 DIAGNOSIS — E87.29 RESPIRATORY ACIDOSIS: ICD-10-CM

## 2022-10-23 DIAGNOSIS — J96.02 ACUTE RESPIRATORY FAILURE WITH HYPOXIA AND HYPERCAPNIA: ICD-10-CM

## 2022-10-23 DIAGNOSIS — Z72.0 TOBACCO ABUSE: ICD-10-CM

## 2022-10-23 DIAGNOSIS — U07.1 COVID-19: ICD-10-CM

## 2022-10-23 PROBLEM — J96.92 HYPERCAPNIC RESPIRATORY FAILURE: Status: ACTIVE | Noted: 2022-10-23

## 2022-10-23 LAB
ALBUMIN SERPL-MCNC: 3.8 G/DL (ref 3.5–5.2)
ALBUMIN SERPL-MCNC: 4 G/DL (ref 3.5–5.2)
ALBUMIN/GLOB SERPL: 1.4 G/DL
ALP SERPL-CCNC: 87 U/L (ref 39–117)
ALP SERPL-CCNC: 98 U/L (ref 39–117)
ALT SERPL W P-5'-P-CCNC: 30 U/L (ref 1–41)
ALT SERPL W P-5'-P-CCNC: 31 U/L (ref 1–41)
ANION GAP SERPL CALCULATED.3IONS-SCNC: 6 MMOL/L (ref 5–15)
ARTERIAL PATENCY WRIST A: POSITIVE
AST SERPL-CCNC: 26 U/L (ref 1–40)
AST SERPL-CCNC: 27 U/L (ref 1–40)
ATMOSPHERIC PRESS: ABNORMAL MM[HG]
BASE EXCESS BLDA CALC-SCNC: 3 MMOL/L (ref 0–3)
BASOPHILS # BLD AUTO: 0.1 10*3/MM3 (ref 0–0.2)
BASOPHILS NFR BLD AUTO: 0.7 % (ref 0–1.5)
BDY SITE: ABNORMAL
BILIRUB CONJ SERPL-MCNC: <0.2 MG/DL (ref 0–0.3)
BILIRUB INDIRECT SERPL-MCNC: NORMAL MG/DL
BILIRUB SERPL-MCNC: 0.2 MG/DL (ref 0–1.2)
BILIRUB SERPL-MCNC: 0.2 MG/DL (ref 0–1.2)
BUN SERPL-MCNC: 18 MG/DL (ref 8–23)
BUN/CREAT SERPL: 20.9 (ref 7–25)
CALCIUM SPEC-SCNC: 8.9 MG/DL (ref 8.6–10.5)
CHLORIDE SERPL-SCNC: 98 MMOL/L (ref 98–107)
CO2 BLDA-SCNC: 33.6 MMOL/L (ref 22–29)
CO2 SERPL-SCNC: 35 MMOL/L (ref 22–29)
CREAT SERPL-MCNC: 0.86 MG/DL (ref 0.76–1.27)
CREAT SERPL-MCNC: 0.91 MG/DL (ref 0.76–1.27)
CRP SERPL-MCNC: 1.2 MG/DL (ref 0–0.5)
D DIMER PPP FEU-MCNC: 0.51 MG/L (FEU) (ref 0–0.59)
D-LACTATE SERPL-SCNC: 0.6 MMOL/L (ref 0.5–2)
D-LACTATE SERPL-SCNC: 1 MMOL/L (ref 0.5–2)
DEPRECATED RDW RBC AUTO: 50.3 FL (ref 37–54)
EGFRCR SERPLBLD CKD-EPI 2021: 95.9 ML/MIN/1.73
EGFRCR SERPLBLD CKD-EPI 2021: 98.5 ML/MIN/1.73
EOSINOPHIL # BLD AUTO: 0 10*3/MM3 (ref 0–0.4)
EOSINOPHIL NFR BLD AUTO: 0.1 % (ref 0.3–6.2)
ERYTHROCYTE [DISTWIDTH] IN BLOOD BY AUTOMATED COUNT: 14 % (ref 12.3–15.4)
GLOBULIN UR ELPH-MCNC: 2.8 GM/DL
GLUCOSE SERPL-MCNC: 107 MG/DL (ref 65–99)
HCO3 BLDA-SCNC: 31.7 MMOL/L (ref 21–28)
HCT VFR BLD AUTO: 53.4 % (ref 37.5–51)
HEMODILUTION: NO
HGB BLD-MCNC: 17.5 G/DL (ref 13–17.7)
INHALED O2 CONCENTRATION: 36 %
L PNEUMO1 AG UR QL IA: NEGATIVE
LDH SERPL-CCNC: 252 U/L (ref 135–225)
LYMPHOCYTES # BLD AUTO: 1 10*3/MM3 (ref 0.7–3.1)
LYMPHOCYTES NFR BLD AUTO: 7.4 % (ref 19.6–45.3)
MCH RBC QN AUTO: 32 PG (ref 26.6–33)
MCHC RBC AUTO-ENTMCNC: 32.7 G/DL (ref 31.5–35.7)
MCV RBC AUTO: 97.6 FL (ref 79–97)
MODALITY: ABNORMAL
MONOCYTES # BLD AUTO: 0.9 10*3/MM3 (ref 0.1–0.9)
MONOCYTES NFR BLD AUTO: 6.8 % (ref 5–12)
NEUTROPHILS NFR BLD AUTO: 11.8 10*3/MM3 (ref 1.7–7)
NEUTROPHILS NFR BLD AUTO: 85 % (ref 42.7–76)
NRBC BLD AUTO-RTO: 0.2 /100 WBC (ref 0–0.2)
NT-PROBNP SERPL-MCNC: 8.3 PG/ML (ref 0–900)
PCO2 BLDA: 61.4 MM HG (ref 35–48)
PH BLDA: 7.32 PH UNITS (ref 7.35–7.45)
PLATELET # BLD AUTO: 132 10*3/MM3 (ref 140–450)
PMV BLD AUTO: 7.9 FL (ref 6–12)
PO2 BLDA: 66.9 MM HG (ref 83–108)
POTASSIUM SERPL-SCNC: 4.1 MMOL/L (ref 3.5–5.2)
PROCALCITONIN SERPL-MCNC: 0.09 NG/ML (ref 0–0.25)
PROT SERPL-MCNC: 6.6 G/DL (ref 6–8.5)
PROT SERPL-MCNC: 6.8 G/DL (ref 6–8.5)
RBC # BLD AUTO: 5.47 10*6/MM3 (ref 4.14–5.8)
S PNEUM AG SPEC QL LA: NEGATIVE
SAO2 % BLDCOA: 90.6 % (ref 94–98)
SODIUM SERPL-SCNC: 139 MMOL/L (ref 136–145)
TROPONIN T SERPL-MCNC: <0.01 NG/ML (ref 0–0.03)
WBC NRBC COR # BLD: 13.9 10*3/MM3 (ref 3.4–10.8)

## 2022-10-23 PROCEDURE — 25010000002 HEPARIN (PORCINE) PER 1000 UNITS: Performed by: INTERNAL MEDICINE

## 2022-10-23 PROCEDURE — 85025 COMPLETE CBC W/AUTO DIFF WBC: CPT | Performed by: NURSE PRACTITIONER

## 2022-10-23 PROCEDURE — 25010000002 KETOROLAC TROMETHAMINE PER 15 MG: Performed by: INTERNAL MEDICINE

## 2022-10-23 PROCEDURE — 25010000002 REMDESIVIR 100 MG RECONSTITUTED SOLUTION: Performed by: INTERNAL MEDICINE

## 2022-10-23 PROCEDURE — 82248 BILIRUBIN DIRECT: CPT | Performed by: NURSE PRACTITIONER

## 2022-10-23 PROCEDURE — 82803 BLOOD GASES ANY COMBINATION: CPT

## 2022-10-23 PROCEDURE — 25010000002 FUROSEMIDE PER 20 MG: Performed by: INTERNAL MEDICINE

## 2022-10-23 PROCEDURE — 83605 ASSAY OF LACTIC ACID: CPT

## 2022-10-23 PROCEDURE — 84145 PROCALCITONIN (PCT): CPT | Performed by: NURSE PRACTITIONER

## 2022-10-23 PROCEDURE — 82565 ASSAY OF CREATININE: CPT | Performed by: INTERNAL MEDICINE

## 2022-10-23 PROCEDURE — 25010000002 CEFTRIAXONE PER 250 MG: Performed by: INTERNAL MEDICINE

## 2022-10-23 PROCEDURE — 80053 COMPREHEN METABOLIC PANEL: CPT | Performed by: NURSE PRACTITIONER

## 2022-10-23 PROCEDURE — 84484 ASSAY OF TROPONIN QUANT: CPT | Performed by: NURSE PRACTITIONER

## 2022-10-23 PROCEDURE — 83880 ASSAY OF NATRIURETIC PEPTIDE: CPT | Performed by: NURSE PRACTITIONER

## 2022-10-23 PROCEDURE — 87899 AGENT NOS ASSAY W/OPTIC: CPT | Performed by: NURSE PRACTITIONER

## 2022-10-23 PROCEDURE — 94761 N-INVAS EAR/PLS OXIMETRY MLT: CPT

## 2022-10-23 PROCEDURE — 86140 C-REACTIVE PROTEIN: CPT | Performed by: NURSE PRACTITIONER

## 2022-10-23 PROCEDURE — 87449 NOS EACH ORGANISM AG IA: CPT | Performed by: INTERNAL MEDICINE

## 2022-10-23 PROCEDURE — 71045 X-RAY EXAM CHEST 1 VIEW: CPT

## 2022-10-23 PROCEDURE — XW033E5 INTRODUCTION OF REMDESIVIR ANTI-INFECTIVE INTO PERIPHERAL VEIN, PERCUTANEOUS APPROACH, NEW TECHNOLOGY GROUP 5: ICD-10-PCS | Performed by: HOSPITALIST

## 2022-10-23 PROCEDURE — 94799 UNLISTED PULMONARY SVC/PX: CPT

## 2022-10-23 PROCEDURE — 83605 ASSAY OF LACTIC ACID: CPT | Performed by: NURSE PRACTITIONER

## 2022-10-23 PROCEDURE — 87040 BLOOD CULTURE FOR BACTERIA: CPT | Performed by: NURSE PRACTITIONER

## 2022-10-23 PROCEDURE — 94660 CPAP INITIATION&MGMT: CPT

## 2022-10-23 PROCEDURE — 36600 WITHDRAWAL OF ARTERIAL BLOOD: CPT

## 2022-10-23 PROCEDURE — 93005 ELECTROCARDIOGRAM TRACING: CPT | Performed by: EMERGENCY MEDICINE

## 2022-10-23 PROCEDURE — 99285 EMERGENCY DEPT VISIT HI MDM: CPT

## 2022-10-23 PROCEDURE — 85379 FIBRIN DEGRADATION QUANT: CPT | Performed by: NURSE PRACTITIONER

## 2022-10-23 PROCEDURE — 83615 LACTATE (LD) (LDH) ENZYME: CPT | Performed by: NURSE PRACTITIONER

## 2022-10-23 PROCEDURE — 25010000002 DEXAMETHASONE PER 1 MG: Performed by: INTERNAL MEDICINE

## 2022-10-23 RX ORDER — ONDANSETRON 4 MG/1
4 TABLET, FILM COATED ORAL EVERY 6 HOURS PRN
Status: DISCONTINUED | OUTPATIENT
Start: 2022-10-23 | End: 2022-10-26 | Stop reason: HOSPADM

## 2022-10-23 RX ORDER — KETOROLAC TROMETHAMINE 15 MG/ML
15 INJECTION, SOLUTION INTRAMUSCULAR; INTRAVENOUS EVERY 6 HOURS PRN
Status: DISPENSED | OUTPATIENT
Start: 2022-10-23 | End: 2022-10-25

## 2022-10-23 RX ORDER — FUROSEMIDE 10 MG/ML
40 INJECTION INTRAMUSCULAR; INTRAVENOUS DAILY
Status: DISCONTINUED | OUTPATIENT
Start: 2022-10-23 | End: 2022-10-26 | Stop reason: HOSPADM

## 2022-10-23 RX ORDER — SODIUM CHLORIDE 0.9 % (FLUSH) 0.9 %
10 SYRINGE (ML) INJECTION EVERY 12 HOURS SCHEDULED
Status: DISCONTINUED | OUTPATIENT
Start: 2022-10-23 | End: 2022-10-26 | Stop reason: HOSPADM

## 2022-10-23 RX ORDER — POLYETHYLENE GLYCOL 3350 17 G/17G
17 POWDER, FOR SOLUTION ORAL DAILY
Status: DISCONTINUED | OUTPATIENT
Start: 2022-10-24 | End: 2022-10-26 | Stop reason: HOSPADM

## 2022-10-23 RX ORDER — ACETAMINOPHEN 160 MG/5ML
650 SOLUTION ORAL EVERY 4 HOURS PRN
Status: DISCONTINUED | OUTPATIENT
Start: 2022-10-23 | End: 2022-10-26 | Stop reason: HOSPADM

## 2022-10-23 RX ORDER — CHOLECALCIFEROL (VITAMIN D3) 125 MCG
5 CAPSULE ORAL NIGHTLY PRN
Status: DISCONTINUED | OUTPATIENT
Start: 2022-10-23 | End: 2022-10-26 | Stop reason: HOSPADM

## 2022-10-23 RX ORDER — DEXAMETHASONE SODIUM PHOSPHATE 4 MG/ML
6 INJECTION, SOLUTION INTRA-ARTICULAR; INTRALESIONAL; INTRAMUSCULAR; INTRAVENOUS; SOFT TISSUE EVERY 12 HOURS
Status: DISCONTINUED | OUTPATIENT
Start: 2022-10-23 | End: 2022-10-25

## 2022-10-23 RX ORDER — BISACODYL 10 MG
10 SUPPOSITORY, RECTAL RECTAL DAILY
Status: DISCONTINUED | OUTPATIENT
Start: 2022-10-24 | End: 2022-10-26 | Stop reason: HOSPADM

## 2022-10-23 RX ORDER — ONDANSETRON 2 MG/ML
4 INJECTION INTRAMUSCULAR; INTRAVENOUS EVERY 6 HOURS PRN
Status: DISCONTINUED | OUTPATIENT
Start: 2022-10-23 | End: 2022-10-26 | Stop reason: HOSPADM

## 2022-10-23 RX ORDER — SODIUM CHLORIDE 0.9 % (FLUSH) 0.9 %
10 SYRINGE (ML) INJECTION AS NEEDED
Status: DISCONTINUED | OUTPATIENT
Start: 2022-10-23 | End: 2022-10-26 | Stop reason: HOSPADM

## 2022-10-23 RX ORDER — ACETAMINOPHEN 325 MG/1
650 TABLET ORAL EVERY 4 HOURS PRN
Status: DISCONTINUED | OUTPATIENT
Start: 2022-10-23 | End: 2022-10-26 | Stop reason: HOSPADM

## 2022-10-23 RX ORDER — ACETAMINOPHEN 650 MG/1
650 SUPPOSITORY RECTAL EVERY 4 HOURS PRN
Status: DISCONTINUED | OUTPATIENT
Start: 2022-10-23 | End: 2022-10-26 | Stop reason: HOSPADM

## 2022-10-23 RX ORDER — NITROGLYCERIN 0.4 MG/1
0.4 TABLET SUBLINGUAL
Status: DISCONTINUED | OUTPATIENT
Start: 2022-10-23 | End: 2022-10-26 | Stop reason: HOSPADM

## 2022-10-23 RX ORDER — HEPARIN SODIUM 5000 [USP'U]/ML
5000 INJECTION, SOLUTION INTRAVENOUS; SUBCUTANEOUS EVERY 8 HOURS SCHEDULED
Status: DISCONTINUED | OUTPATIENT
Start: 2022-10-23 | End: 2022-10-26 | Stop reason: HOSPADM

## 2022-10-23 RX ORDER — ALUMINA, MAGNESIA, AND SIMETHICONE 2400; 2400; 240 MG/30ML; MG/30ML; MG/30ML
15 SUSPENSION ORAL EVERY 6 HOURS PRN
Status: DISCONTINUED | OUTPATIENT
Start: 2022-10-23 | End: 2022-10-26 | Stop reason: HOSPADM

## 2022-10-23 RX ADMIN — CEFTRIAXONE 2 G: 2 INJECTION, POWDER, FOR SOLUTION INTRAMUSCULAR; INTRAVENOUS at 18:49

## 2022-10-23 RX ADMIN — Medication 10 ML: at 22:08

## 2022-10-23 RX ADMIN — KETOROLAC TROMETHAMINE 15 MG: 15 INJECTION, SOLUTION INTRAMUSCULAR; INTRAVENOUS at 22:07

## 2022-10-23 RX ADMIN — REMDESIVIR 200 MG: 100 INJECTION, POWDER, LYOPHILIZED, FOR SOLUTION INTRAVENOUS at 22:07

## 2022-10-23 RX ADMIN — DEXAMETHASONE SODIUM PHOSPHATE 6 MG: 4 INJECTION, SOLUTION INTRAMUSCULAR; INTRAVENOUS at 18:49

## 2022-10-23 RX ADMIN — HEPARIN SODIUM 5000 UNITS: 5000 INJECTION INTRAVENOUS; SUBCUTANEOUS at 22:07

## 2022-10-23 RX ADMIN — FUROSEMIDE 40 MG: 10 INJECTION, SOLUTION INTRAMUSCULAR; INTRAVENOUS at 22:07

## 2022-10-24 ENCOUNTER — APPOINTMENT (OUTPATIENT)
Dept: CARDIOLOGY | Facility: HOSPITAL | Age: 62
End: 2022-10-24

## 2022-10-24 LAB
ALBUMIN SERPL-MCNC: 3.8 G/DL (ref 3.5–5.2)
ALBUMIN/GLOB SERPL: 1.5 G/DL
ALP SERPL-CCNC: 82 U/L (ref 39–117)
ALT SERPL W P-5'-P-CCNC: 28 U/L (ref 1–41)
ANION GAP SERPL CALCULATED.3IONS-SCNC: 9 MMOL/L (ref 5–15)
AST SERPL-CCNC: 24 U/L (ref 1–40)
BASOPHILS # BLD AUTO: 0 10*3/MM3 (ref 0–0.2)
BASOPHILS NFR BLD AUTO: 0 % (ref 0–1.5)
BILIRUB CONJ SERPL-MCNC: <0.2 MG/DL (ref 0–0.3)
BILIRUB SERPL-MCNC: <0.2 MG/DL (ref 0–1.2)
BUN SERPL-MCNC: 16 MG/DL (ref 8–23)
BUN/CREAT SERPL: 19 (ref 7–25)
CALCIUM SPEC-SCNC: 8.4 MG/DL (ref 8.6–10.5)
CHLORIDE SERPL-SCNC: 97 MMOL/L (ref 98–107)
CO2 SERPL-SCNC: 33 MMOL/L (ref 22–29)
CREAT SERPL-MCNC: 0.84 MG/DL (ref 0.76–1.27)
CRP SERPL-MCNC: 7.18 MG/DL (ref 0–0.5)
DEPRECATED RDW RBC AUTO: 48.1 FL (ref 37–54)
EGFRCR SERPLBLD CKD-EPI 2021: 99.2 ML/MIN/1.73
EOSINOPHIL # BLD AUTO: 0.4 10*3/MM3 (ref 0–0.4)
EOSINOPHIL NFR BLD AUTO: 2.3 % (ref 0.3–6.2)
ERYTHROCYTE [DISTWIDTH] IN BLOOD BY AUTOMATED COUNT: 13.8 % (ref 12.3–15.4)
ERYTHROCYTE [SEDIMENTATION RATE] IN BLOOD: 31 MM/HR (ref 0–20)
GLOBULIN UR ELPH-MCNC: 2.5 GM/DL
GLUCOSE SERPL-MCNC: 133 MG/DL (ref 65–99)
HCT VFR BLD AUTO: 50.4 % (ref 37.5–51)
HGB BLD-MCNC: 16.3 G/DL (ref 13–17.7)
L PNEUMO1 AG UR QL IA: NEGATIVE
LYMPHOCYTES # BLD AUTO: 2.9 10*3/MM3 (ref 0.7–3.1)
LYMPHOCYTES NFR BLD AUTO: 15.9 % (ref 19.6–45.3)
MAGNESIUM SERPL-MCNC: 1.5 MG/DL (ref 1.6–2.4)
MCH RBC QN AUTO: 32.2 PG (ref 26.6–33)
MCHC RBC AUTO-ENTMCNC: 32.4 G/DL (ref 31.5–35.7)
MCV RBC AUTO: 99.3 FL (ref 79–97)
MONOCYTES # BLD AUTO: 2.2 10*3/MM3 (ref 0.1–0.9)
MONOCYTES NFR BLD AUTO: 12.5 % (ref 5–12)
MRSA DNA SPEC QL NAA+PROBE: NORMAL
NEUTROPHILS NFR BLD AUTO: 12.5 10*3/MM3 (ref 1.7–7)
NEUTROPHILS NFR BLD AUTO: 69.3 % (ref 42.7–76)
NRBC BLD AUTO-RTO: 0.1 /100 WBC (ref 0–0.2)
PHOSPHATE SERPL-MCNC: 3.6 MG/DL (ref 2.5–4.5)
PLATELET # BLD AUTO: 126 10*3/MM3 (ref 140–450)
PMV BLD AUTO: 8.3 FL (ref 6–12)
POTASSIUM SERPL-SCNC: 4.2 MMOL/L (ref 3.5–5.2)
PROCALCITONIN SERPL-MCNC: 0.12 NG/ML (ref 0–0.25)
PROT SERPL-MCNC: 6.3 G/DL (ref 6–8.5)
QT INTERVAL: 355 MS
RBC # BLD AUTO: 5.08 10*6/MM3 (ref 4.14–5.8)
S PNEUM AG SPEC QL LA: NEGATIVE
SODIUM SERPL-SCNC: 139 MMOL/L (ref 136–145)
WBC NRBC COR # BLD: 18 10*3/MM3 (ref 3.4–10.8)

## 2022-10-24 PROCEDURE — 85652 RBC SED RATE AUTOMATED: CPT | Performed by: INTERNAL MEDICINE

## 2022-10-24 PROCEDURE — 94799 UNLISTED PULMONARY SVC/PX: CPT

## 2022-10-24 PROCEDURE — 36415 COLL VENOUS BLD VENIPUNCTURE: CPT | Performed by: INTERNAL MEDICINE

## 2022-10-24 PROCEDURE — 87641 MR-STAPH DNA AMP PROBE: CPT | Performed by: HOSPITALIST

## 2022-10-24 PROCEDURE — 84100 ASSAY OF PHOSPHORUS: CPT | Performed by: INTERNAL MEDICINE

## 2022-10-24 PROCEDURE — 84145 PROCALCITONIN (PCT): CPT | Performed by: INTERNAL MEDICINE

## 2022-10-24 PROCEDURE — 87205 SMEAR GRAM STAIN: CPT | Performed by: HOSPITALIST

## 2022-10-24 PROCEDURE — 94761 N-INVAS EAR/PLS OXIMETRY MLT: CPT

## 2022-10-24 PROCEDURE — 83735 ASSAY OF MAGNESIUM: CPT | Performed by: INTERNAL MEDICINE

## 2022-10-24 PROCEDURE — 80053 COMPREHEN METABOLIC PANEL: CPT | Performed by: INTERNAL MEDICINE

## 2022-10-24 PROCEDURE — 87449 NOS EACH ORGANISM AG IA: CPT | Performed by: HOSPITALIST

## 2022-10-24 PROCEDURE — 86140 C-REACTIVE PROTEIN: CPT | Performed by: INTERNAL MEDICINE

## 2022-10-24 PROCEDURE — 87899 AGENT NOS ASSAY W/OPTIC: CPT | Performed by: HOSPITALIST

## 2022-10-24 PROCEDURE — 25010000002 CEFTRIAXONE PER 250 MG: Performed by: INTERNAL MEDICINE

## 2022-10-24 PROCEDURE — 25010000002 DEXAMETHASONE PER 1 MG: Performed by: INTERNAL MEDICINE

## 2022-10-24 PROCEDURE — 25010000002 HEPARIN (PORCINE) PER 1000 UNITS: Performed by: INTERNAL MEDICINE

## 2022-10-24 PROCEDURE — 82248 BILIRUBIN DIRECT: CPT | Performed by: INTERNAL MEDICINE

## 2022-10-24 PROCEDURE — 85025 COMPLETE CBC W/AUTO DIFF WBC: CPT | Performed by: INTERNAL MEDICINE

## 2022-10-24 PROCEDURE — 25010000002 REMDESIVIR 100 MG/20ML SOLUTION 1 EACH VIAL: Performed by: INTERNAL MEDICINE

## 2022-10-24 PROCEDURE — 87070 CULTURE OTHR SPECIMN AEROBIC: CPT | Performed by: HOSPITALIST

## 2022-10-24 RX ORDER — METFORMIN HYDROCHLORIDE 500 MG/1
500 TABLET, EXTENDED RELEASE ORAL
COMMUNITY
End: 2022-10-28 | Stop reason: SDUPTHER

## 2022-10-24 RX ADMIN — DEXAMETHASONE SODIUM PHOSPHATE 6 MG: 4 INJECTION, SOLUTION INTRAMUSCULAR; INTRAVENOUS at 17:46

## 2022-10-24 RX ADMIN — ACETAMINOPHEN 650 MG: 325 TABLET, FILM COATED ORAL at 07:51

## 2022-10-24 RX ADMIN — CEFTRIAXONE 2 G: 2 INJECTION, POWDER, FOR SOLUTION INTRAMUSCULAR; INTRAVENOUS at 17:47

## 2022-10-24 RX ADMIN — Medication 10 ML: at 08:53

## 2022-10-24 RX ADMIN — HEPARIN SODIUM 5000 UNITS: 5000 INJECTION INTRAVENOUS; SUBCUTANEOUS at 14:37

## 2022-10-24 RX ADMIN — BISACODYL 10 MG: 10 SUPPOSITORY RECTAL at 08:53

## 2022-10-24 RX ADMIN — HEPARIN SODIUM 5000 UNITS: 5000 INJECTION INTRAVENOUS; SUBCUTANEOUS at 22:11

## 2022-10-24 RX ADMIN — DEXAMETHASONE SODIUM PHOSPHATE 6 MG: 4 INJECTION, SOLUTION INTRAMUSCULAR; INTRAVENOUS at 07:55

## 2022-10-24 RX ADMIN — HEPARIN SODIUM 5000 UNITS: 5000 INJECTION INTRAVENOUS; SUBCUTANEOUS at 07:53

## 2022-10-24 RX ADMIN — Medication 10 ML: at 20:54

## 2022-10-24 RX ADMIN — REMDESIVIR 100 MG: 100 INJECTION, POWDER, LYOPHILIZED, FOR SOLUTION INTRAVENOUS at 20:55

## 2022-10-24 RX ADMIN — POLYETHYLENE GLYCOL 3350 17 G: 17 POWDER, FOR SOLUTION ORAL at 08:53

## 2022-10-25 ENCOUNTER — APPOINTMENT (OUTPATIENT)
Dept: CARDIOLOGY | Facility: HOSPITAL | Age: 62
End: 2022-10-25

## 2022-10-25 LAB
ALBUMIN SERPL-MCNC: 3.3 G/DL (ref 3.5–5.2)
ALP SERPL-CCNC: 72 U/L (ref 39–117)
ALT SERPL W P-5'-P-CCNC: 24 U/L (ref 1–41)
ANION GAP SERPL CALCULATED.3IONS-SCNC: 5 MMOL/L (ref 5–15)
AST SERPL-CCNC: 22 U/L (ref 1–40)
BASOPHILS # BLD AUTO: 0.1 10*3/MM3 (ref 0–0.2)
BASOPHILS NFR BLD AUTO: 0.6 % (ref 0–1.5)
BILIRUB CONJ SERPL-MCNC: <0.2 MG/DL (ref 0–0.3)
BILIRUB INDIRECT SERPL-MCNC: ABNORMAL MG/DL
BILIRUB SERPL-MCNC: <0.2 MG/DL (ref 0–1.2)
BUN SERPL-MCNC: 23 MG/DL (ref 8–23)
BUN/CREAT SERPL: 25.3 (ref 7–25)
CALCIUM SPEC-SCNC: 8.4 MG/DL (ref 8.6–10.5)
CHLORIDE SERPL-SCNC: 97 MMOL/L (ref 98–107)
CO2 SERPL-SCNC: 37 MMOL/L (ref 22–29)
CREAT SERPL-MCNC: 0.91 MG/DL (ref 0.76–1.27)
CRP SERPL-MCNC: 8.05 MG/DL (ref 0–0.5)
DEPRECATED RDW RBC AUTO: 49.4 FL (ref 37–54)
EGFRCR SERPLBLD CKD-EPI 2021: 95.9 ML/MIN/1.73
EOSINOPHIL # BLD AUTO: 0 10*3/MM3 (ref 0–0.4)
EOSINOPHIL NFR BLD AUTO: 0 % (ref 0.3–6.2)
ERYTHROCYTE [DISTWIDTH] IN BLOOD BY AUTOMATED COUNT: 14.1 % (ref 12.3–15.4)
GLUCOSE SERPL-MCNC: 99 MG/DL (ref 65–99)
HCT VFR BLD AUTO: 49.6 % (ref 37.5–51)
HGB BLD-MCNC: 15.7 G/DL (ref 13–17.7)
LYMPHOCYTES # BLD AUTO: 2.2 10*3/MM3 (ref 0.7–3.1)
LYMPHOCYTES NFR BLD AUTO: 14.4 % (ref 19.6–45.3)
MCH RBC QN AUTO: 31.8 PG (ref 26.6–33)
MCHC RBC AUTO-ENTMCNC: 31.7 G/DL (ref 31.5–35.7)
MCV RBC AUTO: 100.5 FL (ref 79–97)
MONOCYTES # BLD AUTO: 0.9 10*3/MM3 (ref 0.1–0.9)
MONOCYTES NFR BLD AUTO: 5.8 % (ref 5–12)
NEUTROPHILS NFR BLD AUTO: 12.3 10*3/MM3 (ref 1.7–7)
NEUTROPHILS NFR BLD AUTO: 79.2 % (ref 42.7–76)
NRBC BLD AUTO-RTO: 0 /100 WBC (ref 0–0.2)
PLATELET # BLD AUTO: 116 10*3/MM3 (ref 140–450)
PMV BLD AUTO: 8.3 FL (ref 6–12)
POTASSIUM SERPL-SCNC: 4.7 MMOL/L (ref 3.5–5.2)
PROT SERPL-MCNC: 5.9 G/DL (ref 6–8.5)
RBC # BLD AUTO: 4.94 10*6/MM3 (ref 4.14–5.8)
SODIUM SERPL-SCNC: 139 MMOL/L (ref 136–145)
TSH SERPL DL<=0.05 MIU/L-ACNC: 0.89 UIU/ML (ref 0.27–4.2)
WBC NRBC COR # BLD: 15.6 10*3/MM3 (ref 3.4–10.8)

## 2022-10-25 PROCEDURE — 84443 ASSAY THYROID STIM HORMONE: CPT | Performed by: HOSPITALIST

## 2022-10-25 PROCEDURE — 36415 COLL VENOUS BLD VENIPUNCTURE: CPT | Performed by: HOSPITALIST

## 2022-10-25 PROCEDURE — 94799 UNLISTED PULMONARY SVC/PX: CPT

## 2022-10-25 PROCEDURE — 25010000002 FUROSEMIDE PER 20 MG: Performed by: INTERNAL MEDICINE

## 2022-10-25 PROCEDURE — 25010000002 DEXAMETHASONE PER 1 MG: Performed by: INTERNAL MEDICINE

## 2022-10-25 PROCEDURE — 85025 COMPLETE CBC W/AUTO DIFF WBC: CPT | Performed by: HOSPITALIST

## 2022-10-25 PROCEDURE — 86140 C-REACTIVE PROTEIN: CPT | Performed by: HOSPITALIST

## 2022-10-25 PROCEDURE — 80048 BASIC METABOLIC PNL TOTAL CA: CPT | Performed by: HOSPITALIST

## 2022-10-25 PROCEDURE — 63710000001 DEXAMETHASONE PER 0.25 MG: Performed by: HOSPITALIST

## 2022-10-25 PROCEDURE — 25010000002 REMDESIVIR 100 MG/20ML SOLUTION 1 EACH VIAL: Performed by: INTERNAL MEDICINE

## 2022-10-25 PROCEDURE — 97161 PT EVAL LOW COMPLEX 20 MIN: CPT

## 2022-10-25 PROCEDURE — 80076 HEPATIC FUNCTION PANEL: CPT | Performed by: INTERNAL MEDICINE

## 2022-10-25 PROCEDURE — 25010000002 CEFTRIAXONE PER 250 MG: Performed by: INTERNAL MEDICINE

## 2022-10-25 PROCEDURE — 93325 DOPPLER ECHO COLOR FLOW MAPG: CPT

## 2022-10-25 PROCEDURE — 25010000002 HEPARIN (PORCINE) PER 1000 UNITS: Performed by: INTERNAL MEDICINE

## 2022-10-25 PROCEDURE — 94660 CPAP INITIATION&MGMT: CPT

## 2022-10-25 PROCEDURE — 93308 TTE F-UP OR LMTD: CPT

## 2022-10-25 PROCEDURE — 97166 OT EVAL MOD COMPLEX 45 MIN: CPT

## 2022-10-25 RX ORDER — HYDROCODONE BITARTRATE AND ACETAMINOPHEN 10; 325 MG/1; MG/1
1 TABLET ORAL EVERY 6 HOURS PRN
Status: DISCONTINUED | OUTPATIENT
Start: 2022-10-25 | End: 2022-10-26 | Stop reason: HOSPADM

## 2022-10-25 RX ORDER — DEXAMETHASONE 4 MG/1
6 TABLET ORAL EVERY 12 HOURS SCHEDULED
Status: DISCONTINUED | OUTPATIENT
Start: 2022-10-25 | End: 2022-10-26 | Stop reason: HOSPADM

## 2022-10-25 RX ORDER — ATORVASTATIN CALCIUM 20 MG/1
20 TABLET, FILM COATED ORAL NIGHTLY
Status: DISCONTINUED | OUTPATIENT
Start: 2022-10-25 | End: 2022-10-26 | Stop reason: HOSPADM

## 2022-10-25 RX ORDER — PANTOPRAZOLE SODIUM 40 MG/1
40 TABLET, DELAYED RELEASE ORAL DAILY
Status: DISCONTINUED | OUTPATIENT
Start: 2022-10-25 | End: 2022-10-26 | Stop reason: HOSPADM

## 2022-10-25 RX ORDER — AMLODIPINE BESYLATE 2.5 MG/1
2.5 TABLET ORAL DAILY
Status: DISCONTINUED | OUTPATIENT
Start: 2022-10-25 | End: 2022-10-26 | Stop reason: HOSPADM

## 2022-10-25 RX ORDER — ASPIRIN 81 MG/1
81 TABLET ORAL DAILY
Status: DISCONTINUED | OUTPATIENT
Start: 2022-10-25 | End: 2022-10-26 | Stop reason: HOSPADM

## 2022-10-25 RX ORDER — NICOTINE 21 MG/24HR
1 PATCH, TRANSDERMAL 24 HOURS TRANSDERMAL
Status: DISCONTINUED | OUTPATIENT
Start: 2022-10-25 | End: 2022-10-26 | Stop reason: HOSPADM

## 2022-10-25 RX ADMIN — HYDROCODONE BITARTRATE AND ACETAMINOPHEN 1 TABLET: 10; 325 TABLET ORAL at 09:06

## 2022-10-25 RX ADMIN — DEXAMETHASONE 6 MG: 4 TABLET ORAL at 20:42

## 2022-10-25 RX ADMIN — Medication 10 ML: at 20:40

## 2022-10-25 RX ADMIN — ATORVASTATIN CALCIUM 20 MG: 20 TABLET, FILM COATED ORAL at 20:41

## 2022-10-25 RX ADMIN — Medication 10 ML: at 09:07

## 2022-10-25 RX ADMIN — HYDROCODONE BITARTRATE AND ACETAMINOPHEN 1 TABLET: 10; 325 TABLET ORAL at 20:41

## 2022-10-25 RX ADMIN — FUROSEMIDE 40 MG: 10 INJECTION, SOLUTION INTRAMUSCULAR; INTRAVENOUS at 09:06

## 2022-10-25 RX ADMIN — HEPARIN SODIUM 5000 UNITS: 5000 INJECTION INTRAVENOUS; SUBCUTANEOUS at 05:23

## 2022-10-25 RX ADMIN — Medication 81 MG: at 09:06

## 2022-10-25 RX ADMIN — AMLODIPINE BESYLATE 2.5 MG: 2.5 TABLET ORAL at 09:06

## 2022-10-25 RX ADMIN — HEPARIN SODIUM 5000 UNITS: 5000 INJECTION INTRAVENOUS; SUBCUTANEOUS at 20:43

## 2022-10-25 RX ADMIN — CEFTRIAXONE 2 G: 2 INJECTION, POWDER, FOR SOLUTION INTRAMUSCULAR; INTRAVENOUS at 17:56

## 2022-10-25 RX ADMIN — DEXAMETHASONE SODIUM PHOSPHATE 6 MG: 4 INJECTION, SOLUTION INTRAMUSCULAR; INTRAVENOUS at 05:23

## 2022-10-25 RX ADMIN — PANTOPRAZOLE SODIUM 40 MG: 40 TABLET, DELAYED RELEASE ORAL at 09:06

## 2022-10-25 RX ADMIN — REMDESIVIR 100 MG: 100 INJECTION, POWDER, LYOPHILIZED, FOR SOLUTION INTRAVENOUS at 20:43

## 2022-10-25 RX ADMIN — NICOTINE 1 PATCH: 21 PATCH, EXTENDED RELEASE TRANSDERMAL at 09:31

## 2022-10-25 RX ADMIN — HEPARIN SODIUM 5000 UNITS: 5000 INJECTION INTRAVENOUS; SUBCUTANEOUS at 13:52

## 2022-10-26 ENCOUNTER — READMISSION MANAGEMENT (OUTPATIENT)
Dept: CALL CENTER | Facility: HOSPITAL | Age: 62
End: 2022-10-26

## 2022-10-26 VITALS
WEIGHT: 315 LBS | RESPIRATION RATE: 16 BRPM | HEART RATE: 67 BPM | DIASTOLIC BLOOD PRESSURE: 92 MMHG | OXYGEN SATURATION: 93 % | HEIGHT: 71 IN | TEMPERATURE: 98.2 F | BODY MASS INDEX: 44.1 KG/M2 | SYSTOLIC BLOOD PRESSURE: 142 MMHG

## 2022-10-26 LAB
ALBUMIN SERPL-MCNC: 3.5 G/DL (ref 3.5–5.2)
ALP SERPL-CCNC: 68 U/L (ref 39–117)
ALT SERPL W P-5'-P-CCNC: 32 U/L (ref 1–41)
ANION GAP SERPL CALCULATED.3IONS-SCNC: 6 MMOL/L (ref 5–15)
AST SERPL-CCNC: 24 U/L (ref 1–40)
BASOPHILS # BLD AUTO: 0 10*3/MM3 (ref 0–0.2)
BASOPHILS NFR BLD AUTO: 0.3 % (ref 0–1.5)
BILIRUB CONJ SERPL-MCNC: <0.2 MG/DL (ref 0–0.3)
BILIRUB INDIRECT SERPL-MCNC: ABNORMAL MG/DL
BILIRUB SERPL-MCNC: 0.2 MG/DL (ref 0–1.2)
BUN SERPL-MCNC: 20 MG/DL (ref 8–23)
BUN/CREAT SERPL: 31.3 (ref 7–25)
CALCIUM SPEC-SCNC: 8 MG/DL (ref 8.6–10.5)
CHLORIDE SERPL-SCNC: 96 MMOL/L (ref 98–107)
CO2 SERPL-SCNC: 37 MMOL/L (ref 22–29)
CREAT SERPL-MCNC: 0.64 MG/DL (ref 0.76–1.27)
DEPRECATED RDW RBC AUTO: 48.1 FL (ref 37–54)
EGFRCR SERPLBLD CKD-EPI 2021: 107.7 ML/MIN/1.73
EOSINOPHIL # BLD AUTO: 0 10*3/MM3 (ref 0–0.4)
EOSINOPHIL NFR BLD AUTO: 0 % (ref 0.3–6.2)
ERYTHROCYTE [DISTWIDTH] IN BLOOD BY AUTOMATED COUNT: 13.8 % (ref 12.3–15.4)
GLUCOSE SERPL-MCNC: 139 MG/DL (ref 65–99)
HCT VFR BLD AUTO: 48.2 % (ref 37.5–51)
HGB BLD-MCNC: 15.7 G/DL (ref 13–17.7)
LYMPHOCYTES # BLD AUTO: 1 10*3/MM3 (ref 0.7–3.1)
LYMPHOCYTES NFR BLD AUTO: 11.6 % (ref 19.6–45.3)
MCH RBC QN AUTO: 32.7 PG (ref 26.6–33)
MCHC RBC AUTO-ENTMCNC: 32.7 G/DL (ref 31.5–35.7)
MCV RBC AUTO: 100.1 FL (ref 79–97)
MONOCYTES # BLD AUTO: 0.4 10*3/MM3 (ref 0.1–0.9)
MONOCYTES NFR BLD AUTO: 4.6 % (ref 5–12)
NEUTROPHILS NFR BLD AUTO: 7.2 10*3/MM3 (ref 1.7–7)
NEUTROPHILS NFR BLD AUTO: 83.5 % (ref 42.7–76)
NRBC BLD AUTO-RTO: 0.1 /100 WBC (ref 0–0.2)
PLATELET # BLD AUTO: 114 10*3/MM3 (ref 140–450)
PMV BLD AUTO: 8.4 FL (ref 6–12)
POTASSIUM SERPL-SCNC: 4.5 MMOL/L (ref 3.5–5.2)
PROT SERPL-MCNC: 5.9 G/DL (ref 6–8.5)
RBC # BLD AUTO: 4.81 10*6/MM3 (ref 4.14–5.8)
SODIUM SERPL-SCNC: 139 MMOL/L (ref 136–145)
WBC NRBC COR # BLD: 8.6 10*3/MM3 (ref 3.4–10.8)

## 2022-10-26 PROCEDURE — 36415 COLL VENOUS BLD VENIPUNCTURE: CPT | Performed by: HOSPITALIST

## 2022-10-26 PROCEDURE — 80048 BASIC METABOLIC PNL TOTAL CA: CPT | Performed by: HOSPITALIST

## 2022-10-26 PROCEDURE — 94799 UNLISTED PULMONARY SVC/PX: CPT

## 2022-10-26 PROCEDURE — 94618 PULMONARY STRESS TESTING: CPT

## 2022-10-26 PROCEDURE — 25010000002 FUROSEMIDE PER 20 MG: Performed by: INTERNAL MEDICINE

## 2022-10-26 PROCEDURE — 25010000002 HEPARIN (PORCINE) PER 1000 UNITS: Performed by: INTERNAL MEDICINE

## 2022-10-26 PROCEDURE — 85025 COMPLETE CBC W/AUTO DIFF WBC: CPT | Performed by: HOSPITALIST

## 2022-10-26 PROCEDURE — 80076 HEPATIC FUNCTION PANEL: CPT | Performed by: INTERNAL MEDICINE

## 2022-10-26 PROCEDURE — 63710000001 DEXAMETHASONE PER 0.25 MG: Performed by: HOSPITALIST

## 2022-10-26 RX ORDER — CEFDINIR 300 MG/1
300 CAPSULE ORAL 2 TIMES DAILY
Qty: 6 CAPSULE | Refills: 0 | Status: SHIPPED | OUTPATIENT
Start: 2022-10-26 | End: 2022-10-29

## 2022-10-26 RX ORDER — DEXAMETHASONE 6 MG/1
6 TABLET ORAL
Qty: 6 TABLET | Refills: 0 | Status: SHIPPED | OUTPATIENT
Start: 2022-10-26 | End: 2022-11-01

## 2022-10-26 RX ORDER — FUROSEMIDE 40 MG/1
40 TABLET ORAL DAILY
Qty: 30 TABLET | Refills: 0 | Status: SHIPPED | OUTPATIENT
Start: 2022-10-26 | End: 2022-11-25

## 2022-10-26 RX ADMIN — NICOTINE 1 PATCH: 21 PATCH, EXTENDED RELEASE TRANSDERMAL at 08:20

## 2022-10-26 RX ADMIN — POLYETHYLENE GLYCOL 3350 17 G: 17 POWDER, FOR SOLUTION ORAL at 08:18

## 2022-10-26 RX ADMIN — PANTOPRAZOLE SODIUM 40 MG: 40 TABLET, DELAYED RELEASE ORAL at 08:18

## 2022-10-26 RX ADMIN — HYDROCODONE BITARTRATE AND ACETAMINOPHEN 1 TABLET: 10; 325 TABLET ORAL at 11:50

## 2022-10-26 RX ADMIN — DEXAMETHASONE 6 MG: 4 TABLET ORAL at 08:18

## 2022-10-26 RX ADMIN — Medication 81 MG: at 08:18

## 2022-10-26 RX ADMIN — Medication 10 ML: at 08:19

## 2022-10-26 RX ADMIN — FUROSEMIDE 40 MG: 10 INJECTION, SOLUTION INTRAMUSCULAR; INTRAVENOUS at 08:18

## 2022-10-26 RX ADMIN — HYDROCODONE BITARTRATE AND ACETAMINOPHEN 1 TABLET: 10; 325 TABLET ORAL at 05:23

## 2022-10-26 RX ADMIN — HEPARIN SODIUM 5000 UNITS: 5000 INJECTION INTRAVENOUS; SUBCUTANEOUS at 05:24

## 2022-10-26 RX ADMIN — AMLODIPINE BESYLATE 2.5 MG: 2.5 TABLET ORAL at 08:18

## 2022-10-27 ENCOUNTER — TRANSITIONAL CARE MANAGEMENT TELEPHONE ENCOUNTER (OUTPATIENT)
Dept: CALL CENTER | Facility: HOSPITAL | Age: 62
End: 2022-10-27

## 2022-10-27 LAB
BACTERIA SPEC RESP CULT: NORMAL
GRAM STN SPEC: NORMAL

## 2022-10-27 NOTE — OUTREACH NOTE
Call Center TCM Note    Flowsheet Row Responses   [facility] facility patient discharged from? [facility]   Does the patient have one of the following disease processes/diagnoses(primary or secondary)? COVID-19   COVID-19 underlying condition? None   TCM attempt successful? No   Unsuccessful attempts Attempt 2   Discharge diagnosis  COVID-19          Mey Simmons RN    10/27/2022, 16:03 EDT

## 2022-10-27 NOTE — OUTREACH NOTE
Prep Survey    Flowsheet Row Responses   [facility] patient discharged from? [facility]   Is LACE score < 7 ? No   Emergency Room discharge w/ pulse ox? No   Eligibility [facility]   Date of Admission 10/23/22   Date of Discharge 10/26/22   Discharge Disposition Home or Self Care   Discharge diagnosis  COVID-19   Does the patient have one of the following disease processes/diagnoses(primary or secondary)? COVID-19   Does the patient have Home health ordered? No   Is there a DME ordered? Yes   What DME was ordered? [facility] - home O2   Prep survey completed? Yes          ESCOBAR BURROUGHS - Registered Nurse

## 2022-10-27 NOTE — OUTREACH NOTE
Call Center TCM Note    Flowsheet Row Responses   [facility] patient discharged from? [facility]   Does the patient have one of the following disease processes/diagnoses(primary or secondary)? COVID-19   COVID-19 underlying condition? None   TCM attempt successful? No   Unsuccessful attempts Attempt 1   Discharge diagnosis  COVID-19          Mey Simmons RN    10/27/2022, 14:41 EDT

## 2022-10-28 ENCOUNTER — TRANSITIONAL CARE MANAGEMENT TELEPHONE ENCOUNTER (OUTPATIENT)
Dept: CALL CENTER | Facility: HOSPITAL | Age: 62
End: 2022-10-28

## 2022-10-28 ENCOUNTER — TELEPHONE (OUTPATIENT)
Dept: FAMILY MEDICINE CLINIC | Facility: CLINIC | Age: 62
End: 2022-10-28

## 2022-10-28 DIAGNOSIS — I10 PRIMARY HYPERTENSION: ICD-10-CM

## 2022-10-28 DIAGNOSIS — G89.29 CHRONIC MIDLINE LOW BACK PAIN WITH LEFT-SIDED SCIATICA: ICD-10-CM

## 2022-10-28 DIAGNOSIS — M79.604 PAIN IN BOTH LOWER EXTREMITIES: ICD-10-CM

## 2022-10-28 DIAGNOSIS — M79.605 PAIN IN BOTH LOWER EXTREMITIES: ICD-10-CM

## 2022-10-28 DIAGNOSIS — M54.42 CHRONIC MIDLINE LOW BACK PAIN WITH LEFT-SIDED SCIATICA: ICD-10-CM

## 2022-10-28 DIAGNOSIS — M54.50 CHRONIC MIDLINE LOW BACK PAIN WITHOUT SCIATICA: ICD-10-CM

## 2022-10-28 DIAGNOSIS — G89.29 CHRONIC MIDLINE LOW BACK PAIN WITHOUT SCIATICA: ICD-10-CM

## 2022-10-28 LAB
BACTERIA SPEC AEROBE CULT: NORMAL
BACTERIA SPEC AEROBE CULT: NORMAL

## 2022-10-28 NOTE — TELEPHONE ENCOUNTER
Caller: James TAVAREZ Chi E    Relationship: Self    Best call back number: [phone]  Requested Prescriptions:   Requested Prescriptions     Pending Prescriptions Disp Refills   • losartan-hydrochlorothiazide (HYZAAR) 100-25 MG per tablet 90 tablet 0     Sig: Take 1 tablet by mouth Daily.   • HYDROcodone-acetaminophen (Norco)  MG per tablet 120 tablet 0     Sig: Take 1 tablet by mouth Every 6 (Six) Hours As Needed for Moderate Pain.   • meloxicam (MOBIC) 15 MG tablet 90 tablet 1     Sig: Take 1 tablet by mouth Daily.   • metFORMIN ER (GLUCOPHAGE-XR) 500 MG 24 hr tablet       Sig: Take 1 tablet by mouth Daily With Dinner.        Pharmacy where request should be sent: [facility] [id number] [place] [address] - [phone] [facility] [phone] FX     Additional details provided by patient:     Does the patient have less than a 3 day supply:  [x] Yes  [] No    Jm Mayes Rep   10/28/22 15:08 EDT

## 2022-10-28 NOTE — OUTREACH NOTE
Call Center TCM Note    Flowsheet Row Responses   [facility] patient discharged from? [facility]   Does the patient have one of the following disease processes/diagnoses(primary or secondary)? COVID-19   COVID-19 underlying condition? None   TCM attempt successful? No   Unsuccessful attempts Attempt 3   Discharge diagnosis  COVID-19          Radha Metz RN    10/28/2022, 11:27 EDT

## 2022-10-31 RX ORDER — HYDROCODONE BITARTRATE AND ACETAMINOPHEN 10; 325 MG/1; MG/1
1 TABLET ORAL EVERY 6 HOURS PRN
Qty: 120 TABLET | Refills: 0 | Status: SHIPPED | OUTPATIENT
Start: 2022-10-31 | End: 2022-12-02 | Stop reason: SDUPTHER

## 2022-10-31 RX ORDER — MELOXICAM 15 MG/1
15 TABLET ORAL DAILY
Qty: 90 TABLET | Refills: 1 | Status: SHIPPED | OUTPATIENT
Start: 2022-10-31

## 2022-10-31 RX ORDER — METFORMIN HYDROCHLORIDE 500 MG/1
500 TABLET, EXTENDED RELEASE ORAL
Qty: 90 TABLET | Refills: 1 | Status: SHIPPED | OUTPATIENT
Start: 2022-10-31

## 2022-10-31 RX ORDER — LOSARTAN POTASSIUM AND HYDROCHLOROTHIAZIDE 25; 100 MG/1; MG/1
1 TABLET ORAL DAILY
Qty: 90 TABLET | Refills: 1 | Status: SHIPPED | OUTPATIENT
Start: 2022-10-31

## 2022-11-02 ENCOUNTER — OFFICE VISIT (OUTPATIENT)
Dept: FAMILY MEDICINE CLINIC | Facility: CLINIC | Age: 62
End: 2022-11-02

## 2022-11-02 VITALS
BODY MASS INDEX: 41.58 KG/M2 | DIASTOLIC BLOOD PRESSURE: 74 MMHG | SYSTOLIC BLOOD PRESSURE: 120 MMHG | OXYGEN SATURATION: 93 % | WEIGHT: 297 LBS | TEMPERATURE: 98.2 F | HEIGHT: 71 IN | HEART RATE: 75 BPM | RESPIRATION RATE: 24 BRPM

## 2022-11-02 DIAGNOSIS — J12.82 PNEUMONIA DUE TO COVID-19 VIRUS: Primary | ICD-10-CM

## 2022-11-02 DIAGNOSIS — F17.200 TOBACCO USE DISORDER: ICD-10-CM

## 2022-11-02 DIAGNOSIS — U07.1 PNEUMONIA DUE TO COVID-19 VIRUS: Primary | ICD-10-CM

## 2022-11-02 DIAGNOSIS — R97.20 ELEVATED PSA: ICD-10-CM

## 2022-11-02 LAB
MAXIMAL PREDICTED HEART RATE: 159 BPM
STRESS TARGET HR: 135 BPM

## 2022-11-02 PROCEDURE — 99213 OFFICE O/P EST LOW 20 MIN: CPT | Performed by: STUDENT IN AN ORGANIZED HEALTH CARE EDUCATION/TRAINING PROGRAM

## 2022-11-02 NOTE — PROGRESS NOTES
"Chief Complaint  Chief Complaint   Patient presents with   • Transitional Care Management     Pt states he was in hospital for  several days with Covid   • Establish Care     Seeing urologist for High PSA     Subjective        Chi Ramirez II is a 61 y.o. male who presents to [facility].    History of Present Illness  Recently hospitalized from 10/23 - 10/26 for covid pna, had to go home with oxygen but has weaned himself off since going home.  No breathing difficulties today.  Plans to return to work tomorrow.      Seeing a urologist for high PSA level of 5.  Plan is to recheck next May and if still elevated to do a biopsy.  No FH of prostate ca that he is aware of.      Recently quit smoker, had smoked on and off for the last 30 years.  Was 1.5 ppd for the last 10-12 yrs.  Plans to quit cold turkey but has nicotine replacement patches [facility] at home if he needs them.  Works as a , local.      Objective   /74   Pulse 75   Temp 98.2 °F (36.8 °C)   Resp 24   Ht 180.3 cm (71\")   Wt 135 kg (297 lb)   SpO2 93%   BMI 41.42 kg/m²     Estimated body mass index is 41.42 kg/m² as calculated from the following:    Height as of this encounter: 180.3 cm (71\").    Weight as of this encounter: 135 kg (297 lb).     Physical Exam   GEN: In no acute distress, non toxic appearing  HEENT: Pupils equal and reactive to light, sclera clear.  CV: Regular rate and rhythm, no murmurs  RESP: Lungs with end expiratory wheezes, otherwise clear.  No IWOB    Result Review :    The following data was reviewed by: Darell Tello DO on 11/02/2022:  CMP    CMP 10/24/22 10/25/22 10/25/22 10/26/22 10/26/22     0048 0048 0405 0405   Glucose 133 (A)  99  139 (A)   BUN 16  23  20   Creatinine 0.84  0.91  0.64 (A)   Sodium 139  139  139   Potassium 4.2  4.7  4.5   Chloride 97 (A)  97 (A)  96 (A)   Calcium 8.4 (A)  8.4 (A)  8.0 (A)   Albumin 3.80 3.30 (A)  3.50    Total Bilirubin <0.2 " <0.2  0.2    Alkaline Phosphatase 82 72  68    AST (SGOT) 24 22  24    ALT (SGPT) 28 24  32    (A) Abnormal value       Comments are available for some flowsheets but are not being displayed.           CBC    CBC 10/24/22 10/25/22 10/26/22   WBC 18.00 (A) 15.60 (A) 8.60   RBC 5.08 4.94 4.81   Hemoglobin 16.3 15.7 15.7   Hematocrit 50.4 49.6 48.2   MCV 99.3 (A) 100.5 (A) 100.1 (A)   MCH 32.2 31.8 32.7   MCHC 32.4 31.7 32.7   RDW 13.8 14.1 13.8   Platelets 126 (A) 116 (A) 114 (A)   (A) Abnormal value            TSH    TSH 3/5/22 10/25/22   TSH 1.080 0.888           Data reviewed: Recent hospitalization notes discharge summary from hospitalization 10/23 - 10/26      Assessment and Plan     Diagnoses and all orders for this visit:    1. Pneumonia due to COVID-19 virus (Primary)  Overall appears much improved.  Slightly wheezy on exam today but oxygen appropriate and no IWOB.  Already completed course of dexamethasone, will hold off on further steroids given overall reassuring appearance today.  Agree with stopping O2, continue to monitor closely at home.      2. Tobacco use disorder  Encouraged continued complete abstinence.  Discussed NRT, wellbutrin, and chantix which are options if he is having difficulty quitting.  Encouraged to find something to replace habit.  He is working to get family members at home to also quit smoking    3. Elevated PSA  Continue f/u with urology, plan for repeat PSA in May w/ biopsy if still elevated      Follow Up     Return in about 6 months (around 5/2/2023) for Annual physical.

## 2022-11-03 RX ORDER — METOPROLOL SUCCINATE 50 MG/1
TABLET, EXTENDED RELEASE ORAL
Qty: 90 TABLET | Refills: 3 | OUTPATIENT
Start: 2022-11-03

## 2022-11-03 RX ORDER — OMEPRAZOLE 40 MG/1
CAPSULE, DELAYED RELEASE ORAL
Qty: 90 CAPSULE | Refills: 3 | Status: SHIPPED | OUTPATIENT
Start: 2022-11-03

## 2022-11-04 ENCOUNTER — TELEPHONE (OUTPATIENT)
Dept: FAMILY MEDICINE CLINIC | Facility: CLINIC | Age: 62
End: 2022-11-04

## 2022-11-04 DIAGNOSIS — I10 PRIMARY HYPERTENSION: Primary | ICD-10-CM

## 2022-11-04 NOTE — TELEPHONE ENCOUNTER
Caller: TIANA VALERIO    Relationship: Emergency Contact    Best call back number: [phone]    Requested Prescriptions: Metoprolol Succinate 50 MG TAKE ONE TABLET BY MOUTH DAILY    Pharmacy where request should be sent: [facility] [id number] - [place], [place] - [address] [place] [address] - [phone] [facility] [phone] FX     Additional details provided by patient: THE PATIENT IS ALMOST OUT OF THIS MEDICATION AND HAD A RECENT REQUEST DENIED. PLEASE ADVISE.    Does the patient have less than a 3 day supply:  [x] Yes  [] No    Jm Dawn Rep   11/04/22 12:35 EDT

## 2022-11-07 RX ORDER — METOPROLOL SUCCINATE 50 MG/1
50 TABLET, EXTENDED RELEASE ORAL DAILY
Qty: 90 TABLET | Refills: 1 | Status: SHIPPED | OUTPATIENT
Start: 2022-11-07 | End: 2023-03-09 | Stop reason: HOSPADM

## 2022-12-02 ENCOUNTER — OFFICE VISIT (OUTPATIENT)
Dept: PAIN MEDICINE | Facility: CLINIC | Age: 62
End: 2022-12-02

## 2022-12-02 VITALS
SYSTOLIC BLOOD PRESSURE: 122 MMHG | HEART RATE: 71 BPM | RESPIRATION RATE: 16 BRPM | DIASTOLIC BLOOD PRESSURE: 75 MMHG | OXYGEN SATURATION: 92 %

## 2022-12-02 DIAGNOSIS — M25.551 BILATERAL HIP PAIN: ICD-10-CM

## 2022-12-02 DIAGNOSIS — G89.29 CHRONIC MIDLINE LOW BACK PAIN WITHOUT SCIATICA: Primary | ICD-10-CM

## 2022-12-02 DIAGNOSIS — M25.562 CHRONIC PAIN OF LEFT KNEE: ICD-10-CM

## 2022-12-02 DIAGNOSIS — G89.29 CHRONIC PAIN OF LEFT KNEE: ICD-10-CM

## 2022-12-02 DIAGNOSIS — M25.511 CHRONIC PAIN OF BOTH SHOULDERS: ICD-10-CM

## 2022-12-02 DIAGNOSIS — M47.816 SPONDYLOSIS OF LUMBAR REGION WITHOUT MYELOPATHY OR RADICULOPATHY: ICD-10-CM

## 2022-12-02 DIAGNOSIS — M12.811 ROTATOR CUFF ARTHROPATHY OF BOTH SHOULDERS: ICD-10-CM

## 2022-12-02 DIAGNOSIS — Z79.899 HIGH RISK MEDICATION USE: Primary | ICD-10-CM

## 2022-12-02 DIAGNOSIS — M25.512 CHRONIC PAIN OF BOTH SHOULDERS: ICD-10-CM

## 2022-12-02 DIAGNOSIS — G89.29 CHRONIC PAIN OF BOTH SHOULDERS: ICD-10-CM

## 2022-12-02 DIAGNOSIS — M54.50 CHRONIC MIDLINE LOW BACK PAIN WITHOUT SCIATICA: Primary | ICD-10-CM

## 2022-12-02 DIAGNOSIS — M12.812 ROTATOR CUFF ARTHROPATHY OF BOTH SHOULDERS: ICD-10-CM

## 2022-12-02 DIAGNOSIS — M46.1 SACROILIITIS: ICD-10-CM

## 2022-12-02 DIAGNOSIS — M25.552 BILATERAL HIP PAIN: ICD-10-CM

## 2022-12-02 PROCEDURE — 99214 OFFICE O/P EST MOD 30 MIN: CPT | Performed by: PHYSICAL MEDICINE & REHABILITATION

## 2022-12-02 RX ORDER — HYDROCODONE BITARTRATE AND ACETAMINOPHEN 10; 325 MG/1; MG/1
1 TABLET ORAL EVERY 6 HOURS PRN
Qty: 120 TABLET | Refills: 0 | Status: SHIPPED | OUTPATIENT
Start: 2022-12-02 | End: 2023-02-24 | Stop reason: SDUPTHER

## 2022-12-02 NOTE — PROGRESS NOTES
Subjective   Chi Ramirez II is a 61 y.o. male.     History of Present Illness  Chronic low back pain, nonradiating, also left knee pain and b/l hip pain, 6/10 at worst, 3/10 at best, always present, varies, began 2018 with workplace injury, burning, tingling, worse with standing and walking, interferes with ADLs, sleep, failed PT. 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 Saw PCP, notes reviewed, as above, with referral for pain management, stable on Norco 10mg TID prn, no red flags notes. No FH of substance abuse. Increased to Norco 10mg QID prn with better relief. Worsening pain over b/l SIJ radiating to front, worse with standing after sitting, climbing down from truck. Also worsening pain over right CMC joint.       The following portions of the patient's history were reviewed and updated as appropriate: allergies, current medications, past family history, past medical history, past social history, past surgical history and problem list.    Review of Systems   Constitutional: Positive for fatigue. Negative for chills and fever.   HENT: Positive for hearing loss. Negative for trouble swallowing.    Eyes: Negative for visual disturbance.   Respiratory: Positive for shortness of breath.    Cardiovascular: Negative for chest pain.   Gastrointestinal: Positive for constipation. Negative for abdominal pain, diarrhea, nausea and vomiting.   Genitourinary: Negative for urinary incontinence.   Musculoskeletal: Positive for arthralgias, back pain and joint swelling. Negative for myalgias and neck pain.   Neurological: Positive for headache. Negative for dizziness, weakness and numbness.       Objective   Physical Exam  Constitutional:       Appearance: Normal appearance. He is well-developed.   HENT:      Head: Normocephalic and atraumatic.   Eyes:      Extraocular Movements: Extraocular movements intact.      Pupils: Pupils are equal, round, and reactive to light.   Cardiovascular:      Rate and Rhythm:  Normal rate and regular rhythm.      Heart sounds: Normal heart sounds.   Pulmonary:      Effort: Pulmonary effort is normal.      Breath sounds: Normal breath sounds.   Abdominal:      General: Bowel sounds are normal. There is no distension.      Palpations: Abdomen is soft.      Tenderness: There is no abdominal tenderness.   Musculoskeletal:      Cervical back: Normal range of motion.      Comments: Pain in b/l groin with FADIR  B/l shoulder: (+) empty can test R>>L   Neurological:      Mental Status: He is alert and oriented to person, place, and time.      Sensory: No sensory deficit.      Deep Tendon Reflexes: Reflexes are normal and symmetric.   Psychiatric:         Mood and Affect: Mood normal.         Behavior: Behavior normal.         Thought Content: Thought content normal.         Judgment: Judgment normal.           Assessment & Plan   Diagnoses and all orders for this visit:    1. Chronic midline low back pain without sciatica (Primary)    2. Bilateral hip pain    3. Chronic pain of left knee    4. Sacroiliitis (HCC)    5. Spondylosis of lumbar region without myelopathy or radiculopathy      UDS in order 11/6/21.  Discussed risks and benefits of opioid treatment for chonic pain with patient, including expectations related to prescription requests, alternative modalities to opioids for managing pain, her treatment plan, risks of dependency and addiction, and safe storage practices for prescribed opioids, as well as proper and improper disposal of all medications.  Treatment plan will consist of continuing current medication as long as it remains effective and is necessary, while evaluating patient at each visit and determining if the medication can be lowered or discontinued, while also using nonopioid therapies to reduce reliance on opioids.  Increased to Norco 10mg QID prn, doing well now.  Constipation controlled with OTC stool softener.  Discussed MBB and RFA of b/l L3-5 facet joints in depth, he  will consider it.  Performed b/l SIJ injections with good relief.  Schedule b/l shoulder subacromial injections.  May inject L knee, b/l hips in future.  RTC for b/l shoulder inj then in 3 months for f/u.      INSPECT REPORT     As part of the patient's treatment plan, I am prescribing controlled substances. The patient has been made aware of appropriate use of such medications, including potential risk of somnolence, limited ability to drive and/or work safely, and the potential for dependence or overdose. It has also bee made clear that these medications are for use by this patient only, without concomitant use of alcohol or other substances unless prescribed.      Patient has completed prescribing agreement detailing terms of continued prescribing of controlled substances, including monitoring INSPECT reports, urine drug screening, and pill counts if necessary. The patient is aware that inappropriate use will results in cessation of prescribing such medications.     INSPECT report has been reviewed and scanned into the patient's chart.     As the clinician, I personally reviewed the INSPECT while the patient was in the office today.     History and physical exam exhibit continued safe and appropriate use of controlled substances.

## 2022-12-20 ENCOUNTER — HOSPITAL ENCOUNTER (OUTPATIENT)
Dept: PAIN MEDICINE | Facility: HOSPITAL | Age: 62
Discharge: HOME | End: 2022-12-20
Admitting: PHYSICAL MEDICINE & REHABILITATION

## 2022-12-20 VITALS
WEIGHT: 300 LBS | SYSTOLIC BLOOD PRESSURE: 116 MMHG | HEIGHT: 71 IN | RESPIRATION RATE: 16 BRPM | HEART RATE: 57 BPM | BODY MASS INDEX: 42 KG/M2 | TEMPERATURE: 98 F | DIASTOLIC BLOOD PRESSURE: 79 MMHG | OXYGEN SATURATION: 94 %

## 2022-12-20 DIAGNOSIS — G89.29 CHRONIC PAIN OF BOTH SHOULDERS: ICD-10-CM

## 2022-12-20 DIAGNOSIS — M25.511 CHRONIC PAIN OF BOTH SHOULDERS: ICD-10-CM

## 2022-12-20 DIAGNOSIS — M12.812 ROTATOR CUFF ARTHROPATHY OF BOTH SHOULDERS: Primary | ICD-10-CM

## 2022-12-20 DIAGNOSIS — M25.512 CHRONIC PAIN OF BOTH SHOULDERS: ICD-10-CM

## 2022-12-20 DIAGNOSIS — M12.811 ROTATOR CUFF ARTHROPATHY OF BOTH SHOULDERS: Primary | ICD-10-CM

## 2022-12-20 PROCEDURE — 25010000002 METHYLPREDNISOLONE PER 40 MG: Performed by: PHYSICAL MEDICINE & REHABILITATION

## 2022-12-20 PROCEDURE — 20610 DRAIN/INJ JOINT/BURSA W/O US: CPT | Performed by: PHYSICAL MEDICINE & REHABILITATION

## 2022-12-20 RX ORDER — METHYLPREDNISOLONE ACETATE 40 MG/ML
40 INJECTION, SUSPENSION INTRA-ARTICULAR; INTRALESIONAL; INTRAMUSCULAR; SOFT TISSUE ONCE
Status: COMPLETED | OUTPATIENT
Start: 2022-12-20 | End: 2022-12-20

## 2022-12-20 RX ORDER — BUPIVACAINE HYDROCHLORIDE 2.5 MG/ML
5 INJECTION, SOLUTION EPIDURAL; INFILTRATION; INTRACAUDAL ONCE
Status: COMPLETED | OUTPATIENT
Start: 2022-12-20 | End: 2022-12-20

## 2022-12-20 RX ADMIN — BUPIVACAINE HYDROCHLORIDE 2.5 ML: 2.5 INJECTION, SOLUTION EPIDURAL; INFILTRATION; INTRACAUDAL; PERINEURAL at 09:24

## 2022-12-20 RX ADMIN — BUPIVACAINE HYDROCHLORIDE 2.5 ML: 2.5 INJECTION, SOLUTION EPIDURAL; INFILTRATION; INTRACAUDAL; PERINEURAL at 09:22

## 2022-12-20 RX ADMIN — METHYLPREDNISOLONE ACETATE 40 MG: 40 INJECTION, SUSPENSION INTRA-ARTICULAR; INTRALESIONAL; INTRAMUSCULAR; INTRASYNOVIAL; SOFT TISSUE at 09:22

## 2022-12-20 RX ADMIN — METHYLPREDNISOLONE ACETATE 40 MG: 40 INJECTION, SUSPENSION INTRA-ARTICULAR; INTRALESIONAL; INTRAMUSCULAR; INTRASYNOVIAL; SOFT TISSUE at 09:24

## 2022-12-20 NOTE — PROCEDURES
Procedures     Chronic low back pain, nonradiating, also left knee pain and b/l hip pain, 6/10 at worst, 3/10 at best, always present, varies, began 2018 with workplace injury, burning, tingling, worse with standing and walking, interferes with ADLs, sleep, failed PT. 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 Saw PCP, notes reviewed, as above, with referral for pain management, stable on Norco 10mg TID prn, no red flags notes. No FH of substance abuse. Increased to Norco 10mg QID prn with better relief. Worsening pain over b/l SIJ radiating to front, worse with standing after sitting, climbing down from truck. Also worsening pain over right CMC joint.    UDS in order 12/3/22.  Discussed risks and benefits of opioid treatment for chonic pain with patient, including expectations related to prescription requests, alternative modalities to opioids for managing pain, her treatment plan, risks of dependency and addiction, and safe storage practices for prescribed opioids, as well as proper and improper disposal of all medications.  Treatment plan will consist of continuing current medication as long as it remains effective and is necessary, while evaluating patient at each visit and determining if the medication can be lowered or discontinued, while also using nonopioid therapies to reduce reliance on opioids.  Increased to Norco 10mg QID prn, doing well now.  Constipation controlled with OTC stool softener.  Discussed MBB and RFA of b/l L3-5 facet joints in depth, he will consider it.  Performed b/l SIJ injections with good relief.  Perform b/l shoulder subacromial injections.  May inject L knee, b/l hips in future.  RTC in 3 months for f/u.      Shoulder Subacromial Injection    PREOPERATIVE DIAGNOSIS: Shoulder pain    POSTOPERATIVE DIAGNOSIS: Shoulder pain    PROCEDURE PERFORMED: BILATERAL SUBACROMIAL SHOULDER INJECTION    The patient understands the risks and benefits of the procedure and wishes to proceed.  "The patient was seen in the preoperative area. Patient's consent was obtained and updated. Vitals were taken. Patient was then placed in a seated position for the injection. Site marked for a lateral approach and prepped with alcohol swabs x 4. A 25 gauge 1.5\"  needle was introduced into the joint space and 3mL of steroid solution containing 2mL 0.25% bupivacaine, and 1mL 40mg Depomedrol was injected after negative aspiration without resistance. The procedure was repeated in all respects on the opposite side. The patient tolerated with no belinda-procedural complications.  A sterile dressing was placed over the puncture site.      "

## 2022-12-21 ENCOUNTER — TELEPHONE (OUTPATIENT)
Dept: PAIN MEDICINE | Facility: HOSPITAL | Age: 62
End: 2022-12-21

## 2022-12-21 NOTE — TELEPHONE ENCOUNTER
Post procedure phone call completed.  Pt states they are doing good but still having pain in shoulders. Instructed to give it a few days for the steroid medication to work and then call us if no relief. Verbalized understanding.

## 2023-02-02 DIAGNOSIS — I10 PRIMARY HYPERTENSION: ICD-10-CM

## 2023-02-02 RX ORDER — ATORVASTATIN CALCIUM 20 MG/1
TABLET, FILM COATED ORAL
Qty: 90 TABLET | Refills: 0 | Status: SHIPPED | OUTPATIENT
Start: 2023-02-02

## 2023-02-02 RX ORDER — AMLODIPINE BESYLATE 2.5 MG/1
TABLET ORAL
Qty: 90 TABLET | Refills: 0 | Status: SHIPPED | OUTPATIENT
Start: 2023-02-02

## 2023-02-24 ENCOUNTER — OFFICE VISIT (OUTPATIENT)
Dept: PAIN MEDICINE | Facility: CLINIC | Age: 63
End: 2023-02-24
Payer: COMMERCIAL

## 2023-02-24 VITALS
SYSTOLIC BLOOD PRESSURE: 136 MMHG | RESPIRATION RATE: 16 BRPM | OXYGEN SATURATION: 93 % | HEART RATE: 66 BPM | DIASTOLIC BLOOD PRESSURE: 79 MMHG

## 2023-02-24 DIAGNOSIS — M47.816 SPONDYLOSIS OF LUMBAR REGION WITHOUT MYELOPATHY OR RADICULOPATHY: ICD-10-CM

## 2023-02-24 DIAGNOSIS — M25.511 CHRONIC PAIN OF BOTH SHOULDERS: ICD-10-CM

## 2023-02-24 DIAGNOSIS — M12.812 ROTATOR CUFF ARTHROPATHY OF BOTH SHOULDERS: ICD-10-CM

## 2023-02-24 DIAGNOSIS — G89.29 CHRONIC PAIN OF BOTH SHOULDERS: ICD-10-CM

## 2023-02-24 DIAGNOSIS — M46.1 SACROILIITIS: ICD-10-CM

## 2023-02-24 DIAGNOSIS — M54.50 CHRONIC MIDLINE LOW BACK PAIN WITHOUT SCIATICA: Primary | ICD-10-CM

## 2023-02-24 DIAGNOSIS — M25.551 BILATERAL HIP PAIN: ICD-10-CM

## 2023-02-24 DIAGNOSIS — M12.811 ROTATOR CUFF ARTHROPATHY OF BOTH SHOULDERS: ICD-10-CM

## 2023-02-24 DIAGNOSIS — G89.29 CHRONIC PAIN OF LEFT KNEE: ICD-10-CM

## 2023-02-24 DIAGNOSIS — G89.29 CHRONIC MIDLINE LOW BACK PAIN WITHOUT SCIATICA: Primary | ICD-10-CM

## 2023-02-24 DIAGNOSIS — M25.562 CHRONIC PAIN OF LEFT KNEE: ICD-10-CM

## 2023-02-24 DIAGNOSIS — M25.512 CHRONIC PAIN OF BOTH SHOULDERS: ICD-10-CM

## 2023-02-24 DIAGNOSIS — M25.552 BILATERAL HIP PAIN: ICD-10-CM

## 2023-02-24 PROCEDURE — 99213 OFFICE O/P EST LOW 20 MIN: CPT | Performed by: PHYSICAL MEDICINE & REHABILITATION

## 2023-02-24 RX ORDER — HYDROCODONE BITARTRATE AND ACETAMINOPHEN 10; 325 MG/1; MG/1
1 TABLET ORAL EVERY 6 HOURS PRN
Qty: 120 TABLET | Refills: 0 | Status: SHIPPED | OUTPATIENT
Start: 2023-02-24

## 2023-02-24 RX ORDER — HYDROCODONE BITARTRATE AND ACETAMINOPHEN 10; 325 MG/1; MG/1
1 TABLET ORAL EVERY 6 HOURS PRN
Qty: 120 TABLET | Refills: 0 | Status: ON HOLD | OUTPATIENT
Start: 2023-02-24 | End: 2023-03-06

## 2023-02-24 NOTE — PROGRESS NOTES
Subjective   Chi Ramirez II is a 62 y.o. male.     History of Present Illness  Chronic low back pain, nonradiating, also left knee pain and b/l hip pain, 6/10 at worst, 3/10 at best, always present, varies, began 2018 with workplace injury, burning, tingling, worse with standing and walking, interferes with ADLs, sleep, failed PT. 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 Saw PCP, notes reviewed, as above, with referral for pain management, stable on Norco 10mg TID prn, no red flags notes. No FH of substance abuse. Increased to Norco 10mg QID prn with better relief. Worsening pain over b/l SIJ radiating to front, worse with standing after sitting, climbing down from truck. Also worsening pain over right CMC joint.       The following portions of the patient's history were reviewed and updated as appropriate: allergies, current medications, past family history, past medical history, past social history, past surgical history and problem list.    Review of Systems   Constitutional: Positive for fatigue. Negative for chills and fever.   HENT: Positive for hearing loss. Negative for trouble swallowing.    Eyes: Negative for visual disturbance.   Respiratory: Positive for shortness of breath.    Cardiovascular: Negative for chest pain.   Gastrointestinal: Positive for constipation. Negative for abdominal pain, diarrhea, nausea and vomiting.   Genitourinary: Negative for urinary incontinence.   Musculoskeletal: Positive for arthralgias, back pain and joint swelling. Negative for myalgias and neck pain.   Neurological: Positive for headache. Negative for dizziness, weakness and numbness.       Objective   Physical Exam  Constitutional:       Appearance: Normal appearance. He is well-developed.   HENT:      Head: Normocephalic and atraumatic.   Eyes:      Extraocular Movements: Extraocular movements intact.      Pupils: Pupils are equal, round, and reactive to light.   Cardiovascular:      Rate and Rhythm:  Normal rate and regular rhythm.      Heart sounds: Normal heart sounds.   Pulmonary:      Effort: Pulmonary effort is normal.      Breath sounds: Normal breath sounds.   Abdominal:      General: Bowel sounds are normal. There is no distension.      Palpations: Abdomen is soft.      Tenderness: There is no abdominal tenderness.   Musculoskeletal:      Cervical back: Normal range of motion.      Comments: Pain in b/l groin with FADIR  B/l shoulder: (+) empty can test R>>L   Neurological:      Mental Status: He is alert and oriented to person, place, and time.      Sensory: No sensory deficit.      Deep Tendon Reflexes: Reflexes are normal and symmetric.   Psychiatric:         Mood and Affect: Mood normal.         Behavior: Behavior normal.         Thought Content: Thought content normal.         Judgment: Judgment normal.           Assessment & Plan   Diagnoses and all orders for this visit:    1. Chronic midline low back pain without sciatica (Primary)    2. Chronic pain of both shoulders    3. Chronic pain of left knee    4. Bilateral hip pain    5. Rotator cuff arthropathy of both shoulders    6. Sacroiliitis (HCC)    7. Spondylosis of lumbar region without myelopathy or radiculopathy      UDS in order 12/3/22.  Discussed risks and benefits of opioid treatment for chonic pain with patient, including expectations related to prescription requests, alternative modalities to opioids for managing pain, her treatment plan, risks of dependency and addiction, and safe storage practices for prescribed opioids, as well as proper and improper disposal of all medications.  Treatment plan will consist of continuing current medication as long as it remains effective and is necessary, while evaluating patient at each visit and determining if the medication can be lowered or discontinued, while also using nonopioid therapies to reduce reliance on opioids.  Increased to Norco 10mg QID prn, doing well now.  Constipation controlled  with OTC stool softener.  Discussed MBB and RFA of b/l L3-5 facet joints in depth, he will consider it.  Performed b/l SIJ injections with good relief.  Performed b/l shoulder subacromial injections with same-day relief only, will not repeat, declines referral for surgical eval at this time.  May inject L knee, b/l hips in future.  RTC in 3 months for f/u.      INSPECT REPORT     As part of the patient's treatment plan, I am prescribing controlled substances. The patient has been made aware of appropriate use of such medications, including potential risk of somnolence, limited ability to drive and/or work safely, and the potential for dependence or overdose. It has also bee made clear that these medications are for use by this patient only, without concomitant use of alcohol or other substances unless prescribed.      Patient has completed prescribing agreement detailing terms of continued prescribing of controlled substances, including monitoring INSPECT reports, urine drug screening, and pill counts if necessary. The patient is aware that inappropriate use will results in cessation of prescribing such medications.     INSPECT report has been reviewed and scanned into the patient's chart.     As the clinician, I personally reviewed the INSPECT while the patient was in the office today.     History and physical exam exhibit continued safe and appropriate use of controlled substances.

## 2023-03-04 ENCOUNTER — APPOINTMENT (OUTPATIENT)
Dept: GENERAL RADIOLOGY | Facility: HOSPITAL | Age: 63
DRG: 177 | End: 2023-03-04
Payer: COMMERCIAL

## 2023-03-04 ENCOUNTER — HOSPITAL ENCOUNTER (INPATIENT)
Facility: HOSPITAL | Age: 63
LOS: 4 days | Discharge: HOME | DRG: 177 | End: 2023-03-09
Attending: EMERGENCY MEDICINE | Admitting: HOSPITALIST
Payer: COMMERCIAL

## 2023-03-04 DIAGNOSIS — J44.1 ACUTE EXACERBATION OF CHRONIC OBSTRUCTIVE PULMONARY DISEASE (COPD): Primary | ICD-10-CM

## 2023-03-04 DIAGNOSIS — U07.1 PNEUMONIA DUE TO COVID-19 VIRUS: ICD-10-CM

## 2023-03-04 DIAGNOSIS — J18.9 MULTIFOCAL PNEUMONIA: ICD-10-CM

## 2023-03-04 DIAGNOSIS — M79.604 PAIN IN BOTH LOWER EXTREMITIES: ICD-10-CM

## 2023-03-04 DIAGNOSIS — M79.605 PAIN IN BOTH LOWER EXTREMITIES: ICD-10-CM

## 2023-03-04 DIAGNOSIS — M54.42 CHRONIC MIDLINE LOW BACK PAIN WITH LEFT-SIDED SCIATICA: ICD-10-CM

## 2023-03-04 DIAGNOSIS — G89.29 CHRONIC MIDLINE LOW BACK PAIN WITH LEFT-SIDED SCIATICA: ICD-10-CM

## 2023-03-04 DIAGNOSIS — J12.82 PNEUMONIA DUE TO COVID-19 VIRUS: ICD-10-CM

## 2023-03-04 DIAGNOSIS — R09.02 HYPOXEMIA: ICD-10-CM

## 2023-03-04 LAB
ALBUMIN SERPL-MCNC: 3.9 G/DL (ref 3.5–5.2)
ALBUMIN SERPL-MCNC: 4.1 G/DL (ref 3.5–5.2)
ALBUMIN/GLOB SERPL: 1.3 G/DL
ALP SERPL-CCNC: 91 U/L (ref 39–117)
ALP SERPL-CCNC: 96 U/L (ref 39–117)
ALT SERPL W P-5'-P-CCNC: 22 U/L (ref 1–41)
ALT SERPL W P-5'-P-CCNC: 25 U/L (ref 1–41)
ANION GAP SERPL CALCULATED.3IONS-SCNC: 10 MMOL/L (ref 5–15)
ANION GAP SERPL CALCULATED.3IONS-SCNC: 11 MMOL/L (ref 5–15)
AST SERPL-CCNC: 20 U/L (ref 1–40)
AST SERPL-CCNC: 23 U/L (ref 1–40)
BASOPHILS # BLD AUTO: 0 10*3/MM3 (ref 0–0.2)
BASOPHILS # BLD AUTO: 0.1 10*3/MM3 (ref 0–0.2)
BASOPHILS NFR BLD AUTO: 0.3 % (ref 0–1.5)
BASOPHILS NFR BLD AUTO: 0.5 % (ref 0–1.5)
BILIRUB CONJ SERPL-MCNC: <0.2 MG/DL (ref 0–0.3)
BILIRUB INDIRECT SERPL-MCNC: NORMAL MG/DL
BILIRUB SERPL-MCNC: 0.3 MG/DL (ref 0–1.2)
BILIRUB SERPL-MCNC: 0.5 MG/DL (ref 0–1.2)
BUN SERPL-MCNC: 16 MG/DL (ref 8–23)
BUN SERPL-MCNC: 18 MG/DL (ref 8–23)
BUN/CREAT SERPL: 19 (ref 7–25)
BUN/CREAT SERPL: 23.1 (ref 7–25)
CALCIUM SPEC-SCNC: 9.1 MG/DL (ref 8.6–10.5)
CALCIUM SPEC-SCNC: 9.5 MG/DL (ref 8.6–10.5)
CHLORIDE SERPL-SCNC: 97 MMOL/L (ref 98–107)
CHLORIDE SERPL-SCNC: 99 MMOL/L (ref 98–107)
CO2 SERPL-SCNC: 29 MMOL/L (ref 22–29)
CO2 SERPL-SCNC: 29 MMOL/L (ref 22–29)
CREAT SERPL-MCNC: 0.78 MG/DL (ref 0.76–1.27)
CREAT SERPL-MCNC: 0.84 MG/DL (ref 0.76–1.27)
CRP SERPL-MCNC: 0.52 MG/DL (ref 0–0.5)
D-LACTATE SERPL-SCNC: 0.9 MMOL/L (ref 0.3–2)
DEPRECATED RDW RBC AUTO: 45.9 FL (ref 37–54)
DEPRECATED RDW RBC AUTO: 45.9 FL (ref 37–54)
EGFRCR SERPLBLD CKD-EPI 2021: 100.8 ML/MIN/1.73
EGFRCR SERPLBLD CKD-EPI 2021: 98.6 ML/MIN/1.73
EOSINOPHIL # BLD AUTO: 0 10*3/MM3 (ref 0–0.4)
EOSINOPHIL # BLD AUTO: 0.1 10*3/MM3 (ref 0–0.4)
EOSINOPHIL NFR BLD AUTO: 0.1 % (ref 0.3–6.2)
EOSINOPHIL NFR BLD AUTO: 0.6 % (ref 0.3–6.2)
ERYTHROCYTE [DISTWIDTH] IN BLOOD BY AUTOMATED COUNT: 13.4 % (ref 12.3–15.4)
ERYTHROCYTE [DISTWIDTH] IN BLOOD BY AUTOMATED COUNT: 13.5 % (ref 12.3–15.4)
FLUAV SUBTYP SPEC NAA+PROBE: NOT DETECTED
FLUBV RNA ISLT QL NAA+PROBE: NOT DETECTED
GLOBULIN UR ELPH-MCNC: 3.1 GM/DL
GLUCOSE SERPL-MCNC: 139 MG/DL (ref 65–99)
GLUCOSE SERPL-MCNC: 99 MG/DL (ref 65–99)
HCT VFR BLD AUTO: 54.7 % (ref 37.5–51)
HCT VFR BLD AUTO: 57.9 % (ref 37.5–51)
HGB BLD-MCNC: 17.6 G/DL (ref 13–17.7)
HGB BLD-MCNC: 18.7 G/DL (ref 13–17.7)
HOLD SPECIMEN: NORMAL
INR PPP: 1.01 (ref 0.93–1.1)
LYMPHOCYTES # BLD AUTO: 0.6 10*3/MM3 (ref 0.7–3.1)
LYMPHOCYTES # BLD AUTO: 1.6 10*3/MM3 (ref 0.7–3.1)
LYMPHOCYTES NFR BLD AUTO: 11.2 % (ref 19.6–45.3)
LYMPHOCYTES NFR BLD AUTO: 4.5 % (ref 19.6–45.3)
MAGNESIUM SERPL-MCNC: 1.5 MG/DL (ref 1.6–2.4)
MCH RBC QN AUTO: 31.7 PG (ref 26.6–33)
MCH RBC QN AUTO: 31.9 PG (ref 26.6–33)
MCHC RBC AUTO-ENTMCNC: 32.2 G/DL (ref 31.5–35.7)
MCHC RBC AUTO-ENTMCNC: 32.2 G/DL (ref 31.5–35.7)
MCV RBC AUTO: 98.3 FL (ref 79–97)
MCV RBC AUTO: 98.9 FL (ref 79–97)
MONOCYTES # BLD AUTO: 0.1 10*3/MM3 (ref 0.1–0.9)
MONOCYTES # BLD AUTO: 0.8 10*3/MM3 (ref 0.1–0.9)
MONOCYTES NFR BLD AUTO: 0.8 % (ref 5–12)
MONOCYTES NFR BLD AUTO: 5.7 % (ref 5–12)
NEUTROPHILS NFR BLD AUTO: 12 10*3/MM3 (ref 1.7–7)
NEUTROPHILS NFR BLD AUTO: 13.5 10*3/MM3 (ref 1.7–7)
NEUTROPHILS NFR BLD AUTO: 82 % (ref 42.7–76)
NEUTROPHILS NFR BLD AUTO: 94.3 % (ref 42.7–76)
NRBC BLD AUTO-RTO: 0 /100 WBC (ref 0–0.2)
NRBC BLD AUTO-RTO: 0.1 /100 WBC (ref 0–0.2)
NT-PROBNP SERPL-MCNC: <36 PG/ML (ref 0–900)
PHOSPHATE SERPL-MCNC: 2.9 MG/DL (ref 2.5–4.5)
PLATELET # BLD AUTO: 161 10*3/MM3 (ref 140–450)
PLATELET # BLD AUTO: 178 10*3/MM3 (ref 140–450)
PMV BLD AUTO: 7.6 FL (ref 6–12)
PMV BLD AUTO: 8.2 FL (ref 6–12)
POTASSIUM SERPL-SCNC: 3.8 MMOL/L (ref 3.5–5.2)
POTASSIUM SERPL-SCNC: 4.3 MMOL/L (ref 3.5–5.2)
PROCALCITONIN SERPL-MCNC: 0.05 NG/ML (ref 0–0.25)
PROT SERPL-MCNC: 7 G/DL (ref 6–8.5)
PROT SERPL-MCNC: 7.2 G/DL (ref 6–8.5)
PROTHROMBIN TIME: 10.4 SECONDS (ref 9.6–11.7)
RBC # BLD AUTO: 5.56 10*6/MM3 (ref 4.14–5.8)
RBC # BLD AUTO: 5.86 10*6/MM3 (ref 4.14–5.8)
SARS-COV-2 RNA RESP QL NAA+PROBE: DETECTED
SODIUM SERPL-SCNC: 137 MMOL/L (ref 136–145)
SODIUM SERPL-SCNC: 138 MMOL/L (ref 136–145)
TROPONIN T SERPL HS-MCNC: 17 NG/L
WBC NRBC COR # BLD: 14.3 10*3/MM3 (ref 3.4–10.8)
WBC NRBC COR # BLD: 14.6 10*3/MM3 (ref 3.4–10.8)
WHOLE BLOOD HOLD COAG: NORMAL
WHOLE BLOOD HOLD SPECIMEN: NORMAL

## 2023-03-04 PROCEDURE — 80048 BASIC METABOLIC PNL TOTAL CA: CPT

## 2023-03-04 PROCEDURE — 87636 SARSCOV2 & INF A&B AMP PRB: CPT | Performed by: EMERGENCY MEDICINE

## 2023-03-04 PROCEDURE — 86140 C-REACTIVE PROTEIN: CPT | Performed by: EMERGENCY MEDICINE

## 2023-03-04 PROCEDURE — 94660 CPAP INITIATION&MGMT: CPT

## 2023-03-04 PROCEDURE — G0378 HOSPITAL OBSERVATION PER HR: HCPCS

## 2023-03-04 PROCEDURE — 80053 COMPREHEN METABOLIC PANEL: CPT | Performed by: EMERGENCY MEDICINE

## 2023-03-04 PROCEDURE — 25010000002 METHYLPREDNISOLONE PER 125 MG: Performed by: EMERGENCY MEDICINE

## 2023-03-04 PROCEDURE — 94799 UNLISTED PULMONARY SVC/PX: CPT

## 2023-03-04 PROCEDURE — 93005 ELECTROCARDIOGRAM TRACING: CPT

## 2023-03-04 PROCEDURE — 5A09357 ASSISTANCE WITH RESPIRATORY VENTILATION, LESS THAN 24 CONSECUTIVE HOURS, CONTINUOUS POSITIVE AIRWAY PRESSURE: ICD-10-PCS | Performed by: HOSPITALIST

## 2023-03-04 PROCEDURE — 84145 PROCALCITONIN (PCT): CPT | Performed by: EMERGENCY MEDICINE

## 2023-03-04 PROCEDURE — 25010000002 CEFTRIAXONE PER 250 MG: Performed by: EMERGENCY MEDICINE

## 2023-03-04 PROCEDURE — 94640 AIRWAY INHALATION TREATMENT: CPT

## 2023-03-04 PROCEDURE — 85025 COMPLETE CBC W/AUTO DIFF WBC: CPT

## 2023-03-04 PROCEDURE — 25010000002 ONDANSETRON PER 1 MG: Performed by: EMERGENCY MEDICINE

## 2023-03-04 PROCEDURE — 84484 ASSAY OF TROPONIN QUANT: CPT | Performed by: EMERGENCY MEDICINE

## 2023-03-04 PROCEDURE — 84100 ASSAY OF PHOSPHORUS: CPT

## 2023-03-04 PROCEDURE — 83605 ASSAY OF LACTIC ACID: CPT

## 2023-03-04 PROCEDURE — 82248 BILIRUBIN DIRECT: CPT | Performed by: EMERGENCY MEDICINE

## 2023-03-04 PROCEDURE — 85610 PROTHROMBIN TIME: CPT

## 2023-03-04 PROCEDURE — 85025 COMPLETE CBC W/AUTO DIFF WBC: CPT | Performed by: EMERGENCY MEDICINE

## 2023-03-04 PROCEDURE — 71045 X-RAY EXAM CHEST 1 VIEW: CPT

## 2023-03-04 PROCEDURE — 83880 ASSAY OF NATRIURETIC PEPTIDE: CPT | Performed by: EMERGENCY MEDICINE

## 2023-03-04 PROCEDURE — 83735 ASSAY OF MAGNESIUM: CPT

## 2023-03-04 PROCEDURE — 94761 N-INVAS EAR/PLS OXIMETRY MLT: CPT

## 2023-03-04 PROCEDURE — 93005 ELECTROCARDIOGRAM TRACING: CPT | Performed by: EMERGENCY MEDICINE

## 2023-03-04 PROCEDURE — 84484 ASSAY OF TROPONIN QUANT: CPT

## 2023-03-04 PROCEDURE — 87040 BLOOD CULTURE FOR BACTERIA: CPT | Performed by: EMERGENCY MEDICINE

## 2023-03-04 PROCEDURE — 99285 EMERGENCY DEPT VISIT HI MDM: CPT

## 2023-03-04 RX ORDER — SODIUM CHLORIDE 0.9 % (FLUSH) 0.9 %
10 SYRINGE (ML) INJECTION AS NEEDED
Status: DISCONTINUED | OUTPATIENT
Start: 2023-03-04 | End: 2023-03-09 | Stop reason: HOSPADM

## 2023-03-04 RX ORDER — IPRATROPIUM BROMIDE AND ALBUTEROL SULFATE 2.5; .5 MG/3ML; MG/3ML
3 SOLUTION RESPIRATORY (INHALATION) ONCE
Status: COMPLETED | OUTPATIENT
Start: 2023-03-04 | End: 2023-03-04

## 2023-03-04 RX ORDER — ACETAMINOPHEN 650 MG/1
650 SUPPOSITORY RECTAL EVERY 4 HOURS PRN
Status: DISCONTINUED | OUTPATIENT
Start: 2023-03-04 | End: 2023-03-09 | Stop reason: HOSPADM

## 2023-03-04 RX ORDER — ONDANSETRON 2 MG/ML
4 INJECTION INTRAMUSCULAR; INTRAVENOUS EVERY 6 HOURS PRN
Status: DISCONTINUED | OUTPATIENT
Start: 2023-03-04 | End: 2023-03-09 | Stop reason: HOSPADM

## 2023-03-04 RX ORDER — ACETAMINOPHEN 325 MG/1
650 TABLET ORAL EVERY 4 HOURS PRN
Status: DISCONTINUED | OUTPATIENT
Start: 2023-03-04 | End: 2023-03-09 | Stop reason: HOSPADM

## 2023-03-04 RX ORDER — METHYLPREDNISOLONE SODIUM SUCCINATE 125 MG/2ML
125 INJECTION, POWDER, LYOPHILIZED, FOR SOLUTION INTRAMUSCULAR; INTRAVENOUS ONCE
Status: COMPLETED | OUTPATIENT
Start: 2023-03-04 | End: 2023-03-04

## 2023-03-04 RX ORDER — SODIUM CHLORIDE 9 MG/ML
40 INJECTION, SOLUTION INTRAVENOUS AS NEEDED
Status: DISCONTINUED | OUTPATIENT
Start: 2023-03-04 | End: 2023-03-09 | Stop reason: HOSPADM

## 2023-03-04 RX ORDER — IPRATROPIUM BROMIDE AND ALBUTEROL SULFATE 2.5; .5 MG/3ML; MG/3ML
3 SOLUTION RESPIRATORY (INHALATION) EVERY 6 HOURS PRN
Status: DISCONTINUED | OUTPATIENT
Start: 2023-03-04 | End: 2023-03-09 | Stop reason: HOSPADM

## 2023-03-04 RX ORDER — NITROGLYCERIN 0.4 MG/1
0.4 TABLET SUBLINGUAL
Status: DISCONTINUED | OUTPATIENT
Start: 2023-03-04 | End: 2023-03-09 | Stop reason: HOSPADM

## 2023-03-04 RX ORDER — ONDANSETRON 2 MG/ML
4 INJECTION INTRAMUSCULAR; INTRAVENOUS ONCE
Status: COMPLETED | OUTPATIENT
Start: 2023-03-04 | End: 2023-03-04

## 2023-03-04 RX ORDER — ACETAMINOPHEN 160 MG/5ML
650 SOLUTION ORAL EVERY 4 HOURS PRN
Status: DISCONTINUED | OUTPATIENT
Start: 2023-03-04 | End: 2023-03-09 | Stop reason: HOSPADM

## 2023-03-04 RX ORDER — CHOLECALCIFEROL (VITAMIN D3) 125 MCG
5 CAPSULE ORAL NIGHTLY PRN
Status: DISCONTINUED | OUTPATIENT
Start: 2023-03-04 | End: 2023-03-09 | Stop reason: HOSPADM

## 2023-03-04 RX ORDER — MAGNESIUM SULFATE HEPTAHYDRATE 40 MG/ML
2 INJECTION, SOLUTION INTRAVENOUS
Status: COMPLETED | OUTPATIENT
Start: 2023-03-05 | End: 2023-03-05

## 2023-03-04 RX ORDER — DEXAMETHASONE SODIUM PHOSPHATE 4 MG/ML
6 INJECTION, SOLUTION INTRA-ARTICULAR; INTRALESIONAL; INTRAMUSCULAR; INTRAVENOUS; SOFT TISSUE DAILY
Status: DISCONTINUED | OUTPATIENT
Start: 2023-03-05 | End: 2023-03-07

## 2023-03-04 RX ORDER — DEXAMETHASONE 6 MG/1
6 TABLET ORAL DAILY
Status: DISCONTINUED | OUTPATIENT
Start: 2023-03-05 | End: 2023-03-09 | Stop reason: HOSPADM

## 2023-03-04 RX ORDER — ONDANSETRON 4 MG/1
4 TABLET, FILM COATED ORAL EVERY 6 HOURS PRN
Status: DISCONTINUED | OUTPATIENT
Start: 2023-03-04 | End: 2023-03-09 | Stop reason: HOSPADM

## 2023-03-04 RX ORDER — NICOTINE 21 MG/24HR
1 PATCH, TRANSDERMAL 24 HOURS TRANSDERMAL
Status: DISCONTINUED | OUTPATIENT
Start: 2023-03-04 | End: 2023-03-06

## 2023-03-04 RX ORDER — SODIUM CHLORIDE 0.9 % (FLUSH) 0.9 %
10 SYRINGE (ML) INJECTION EVERY 12 HOURS SCHEDULED
Status: DISCONTINUED | OUTPATIENT
Start: 2023-03-04 | End: 2023-03-09 | Stop reason: HOSPADM

## 2023-03-04 RX ADMIN — DOXYCYCLINE 100 MG: 100 INJECTION, POWDER, LYOPHILIZED, FOR SOLUTION INTRAVENOUS at 20:07

## 2023-03-04 RX ADMIN — NITROGLYCERIN 1 INCH: 20 OINTMENT TOPICAL at 20:07

## 2023-03-04 RX ADMIN — METHYLPREDNISOLONE SODIUM SUCCINATE 125 MG: 125 INJECTION, POWDER, FOR SOLUTION INTRAMUSCULAR; INTRAVENOUS at 20:06

## 2023-03-04 RX ADMIN — CEFTRIAXONE SODIUM 2 G: 2 INJECTION, POWDER, FOR SOLUTION INTRAMUSCULAR; INTRAVENOUS at 20:07

## 2023-03-04 RX ADMIN — ONDANSETRON 4 MG: 2 INJECTION INTRAMUSCULAR; INTRAVENOUS at 20:06

## 2023-03-04 RX ADMIN — IPRATROPIUM BROMIDE AND ALBUTEROL SULFATE 3 ML: 2.5; .5 SOLUTION RESPIRATORY (INHALATION) at 19:56

## 2023-03-05 LAB
ALBUMIN SERPL-MCNC: 3.7 G/DL (ref 3.5–5.2)
ALP SERPL-CCNC: 86 U/L (ref 39–117)
ALT SERPL W P-5'-P-CCNC: 22 U/L (ref 1–41)
ARTERIAL PATENCY WRIST A: POSITIVE
AST SERPL-CCNC: 22 U/L (ref 1–40)
ATMOSPHERIC PRESS: ABNORMAL MM[HG]
BASE EXCESS BLDA CALC-SCNC: 2.5 MMOL/L (ref 0–3)
BDY SITE: ABNORMAL
BILIRUB CONJ SERPL-MCNC: <0.2 MG/DL (ref 0–0.3)
BILIRUB INDIRECT SERPL-MCNC: NORMAL MG/DL
BILIRUB SERPL-MCNC: 0.3 MG/DL (ref 0–1.2)
CO2 BLDA-SCNC: 33.8 MMOL/L (ref 22–29)
GEN 5 2HR TROPONIN T REFLEX: 12 NG/L
HCO3 BLDA-SCNC: 31.8 MMOL/L (ref 21–28)
HEMODILUTION: NO
INHALED O2 CONCENTRATION: 100 %
MODALITY: ABNORMAL
PAW @ PEAK INSP FLOW SETTING VENT: 16 CMH2O
PCO2 BLDA: 64 MM HG (ref 35–48)
PEEP RESPIRATORY: 10 CM[H2O]
PH BLDA: 7.3 PH UNITS (ref 7.35–7.45)
PO2 BLDA: 115.4 MM HG (ref 83–108)
PROT SERPL-MCNC: 6.6 G/DL (ref 6–8.5)
RESPIRATORY RATE: 21
SAO2 % BLDCOA: 97.9 % (ref 94–98)
TROPONIN T DELTA: -2 NG/L
TROPONIN T SERPL HS-MCNC: 14 NG/L

## 2023-03-05 PROCEDURE — 94761 N-INVAS EAR/PLS OXIMETRY MLT: CPT

## 2023-03-05 PROCEDURE — 84484 ASSAY OF TROPONIN QUANT: CPT

## 2023-03-05 PROCEDURE — 36415 COLL VENOUS BLD VENIPUNCTURE: CPT

## 2023-03-05 PROCEDURE — 36600 WITHDRAWAL OF ARTERIAL BLOOD: CPT

## 2023-03-05 PROCEDURE — 25010000002 CEFTRIAXONE PER 250 MG: Performed by: EMERGENCY MEDICINE

## 2023-03-05 PROCEDURE — XW033E5 INTRODUCTION OF REMDESIVIR ANTI-INFECTIVE INTO PERIPHERAL VEIN, PERCUTANEOUS APPROACH, NEW TECHNOLOGY GROUP 5: ICD-10-PCS | Performed by: HOSPITALIST

## 2023-03-05 PROCEDURE — 94660 CPAP INITIATION&MGMT: CPT

## 2023-03-05 PROCEDURE — 25010000002 REMDESIVIR 100 MG RECONSTITUTED SOLUTION

## 2023-03-05 PROCEDURE — 82803 BLOOD GASES ANY COMBINATION: CPT

## 2023-03-05 PROCEDURE — 94799 UNLISTED PULMONARY SVC/PX: CPT

## 2023-03-05 PROCEDURE — 80076 HEPATIC FUNCTION PANEL: CPT

## 2023-03-05 PROCEDURE — 25010000002 MAGNESIUM SULFATE 2 GM/50ML SOLUTION

## 2023-03-05 RX ORDER — MAGNESIUM SULFATE HEPTAHYDRATE 40 MG/ML
4 INJECTION, SOLUTION INTRAVENOUS AS NEEDED
Status: DISCONTINUED | OUTPATIENT
Start: 2023-03-05 | End: 2023-03-09 | Stop reason: HOSPADM

## 2023-03-05 RX ORDER — MAGNESIUM SULFATE HEPTAHYDRATE 40 MG/ML
2 INJECTION, SOLUTION INTRAVENOUS AS NEEDED
Status: DISCONTINUED | OUTPATIENT
Start: 2023-03-05 | End: 2023-03-09 | Stop reason: HOSPADM

## 2023-03-05 RX ORDER — POTASSIUM CHLORIDE 1.5 G/1.77G
40 POWDER, FOR SOLUTION ORAL AS NEEDED
Status: DISCONTINUED | OUTPATIENT
Start: 2023-03-05 | End: 2023-03-09 | Stop reason: HOSPADM

## 2023-03-05 RX ORDER — LOSARTAN POTASSIUM 50 MG/1
50 TABLET ORAL
Status: DISCONTINUED | OUTPATIENT
Start: 2023-03-05 | End: 2023-03-09 | Stop reason: HOSPADM

## 2023-03-05 RX ORDER — HYDROCHLOROTHIAZIDE 12.5 MG/1
12.5 TABLET ORAL
Status: DISCONTINUED | OUTPATIENT
Start: 2023-03-05 | End: 2023-03-09 | Stop reason: HOSPADM

## 2023-03-05 RX ORDER — BENZONATATE 100 MG/1
200 CAPSULE ORAL 3 TIMES DAILY PRN
Status: DISCONTINUED | OUTPATIENT
Start: 2023-03-05 | End: 2023-03-09 | Stop reason: HOSPADM

## 2023-03-05 RX ORDER — ATORVASTATIN CALCIUM 20 MG/1
20 TABLET, FILM COATED ORAL DAILY
Status: DISCONTINUED | OUTPATIENT
Start: 2023-03-05 | End: 2023-03-09 | Stop reason: HOSPADM

## 2023-03-05 RX ORDER — HYDROCODONE BITARTRATE AND ACETAMINOPHEN 10; 325 MG/1; MG/1
1 TABLET ORAL EVERY 6 HOURS PRN
Status: DISCONTINUED | OUTPATIENT
Start: 2023-03-05 | End: 2023-03-09 | Stop reason: HOSPADM

## 2023-03-05 RX ORDER — ASPIRIN 81 MG/1
81 TABLET ORAL DAILY
Status: DISCONTINUED | OUTPATIENT
Start: 2023-03-05 | End: 2023-03-09 | Stop reason: HOSPADM

## 2023-03-05 RX ORDER — POTASSIUM CHLORIDE 20 MEQ/1
40 TABLET, EXTENDED RELEASE ORAL AS NEEDED
Status: DISCONTINUED | OUTPATIENT
Start: 2023-03-05 | End: 2023-03-09 | Stop reason: HOSPADM

## 2023-03-05 RX ORDER — GUAIFENESIN 200 MG/10ML
200 LIQUID ORAL EVERY 4 HOURS PRN
Status: DISCONTINUED | OUTPATIENT
Start: 2023-03-05 | End: 2023-03-09 | Stop reason: HOSPADM

## 2023-03-05 RX ORDER — AMLODIPINE BESYLATE 5 MG/1
2.5 TABLET ORAL DAILY
Status: DISCONTINUED | OUTPATIENT
Start: 2023-03-05 | End: 2023-03-09 | Stop reason: HOSPADM

## 2023-03-05 RX ADMIN — DOXYCYCLINE 100 MG: 100 INJECTION, POWDER, LYOPHILIZED, FOR SOLUTION INTRAVENOUS at 08:36

## 2023-03-05 RX ADMIN — MAGNESIUM SULFATE HEPTAHYDRATE 2 G: 2 INJECTION, SOLUTION INTRAVENOUS at 05:08

## 2023-03-05 RX ADMIN — MAGNESIUM SULFATE HEPTAHYDRATE 2 G: 2 INJECTION, SOLUTION INTRAVENOUS at 02:00

## 2023-03-05 RX ADMIN — Medication 10 ML: at 21:31

## 2023-03-05 RX ADMIN — DOXYCYCLINE 100 MG: 100 INJECTION, POWDER, LYOPHILIZED, FOR SOLUTION INTRAVENOUS at 21:30

## 2023-03-05 RX ADMIN — Medication 10 ML: at 08:38

## 2023-03-05 RX ADMIN — REMDESIVIR 200 MG: 100 INJECTION, POWDER, LYOPHILIZED, FOR SOLUTION INTRAVENOUS at 02:00

## 2023-03-05 RX ADMIN — CEFTRIAXONE SODIUM 2 G: 2 INJECTION, POWDER, FOR SOLUTION INTRAMUSCULAR; INTRAVENOUS at 23:13

## 2023-03-05 RX ADMIN — NICOTINE 1 PATCH: 21 PATCH, EXTENDED RELEASE TRANSDERMAL at 16:34

## 2023-03-05 RX ADMIN — LOSARTAN POTASSIUM 50 MG: 50 TABLET, FILM COATED ORAL at 11:48

## 2023-03-05 RX ADMIN — HYDROCHLOROTHIAZIDE 12.5 MG: 12.5 TABLET ORAL at 11:48

## 2023-03-05 RX ADMIN — AMLODIPINE BESYLATE 2.5 MG: 5 TABLET ORAL at 11:48

## 2023-03-05 RX ADMIN — ASPIRIN 81 MG: 81 TABLET, COATED ORAL at 11:48

## 2023-03-05 RX ADMIN — DEXAMETHASONE 6 MG: 6 TABLET ORAL at 08:36

## 2023-03-05 RX ADMIN — ACETAMINOPHEN 650 MG: 325 TABLET, FILM COATED ORAL at 16:26

## 2023-03-05 RX ADMIN — ATORVASTATIN CALCIUM 20 MG: 20 TABLET, FILM COATED ORAL at 11:48

## 2023-03-05 NOTE — PROGRESS NOTES
[facility] Daily Progress Note    Patient Name: Chi Ramirez II  : 1960  MRN: [id number]  Primary Care Physician:  Darell Tello DO  Date of admission: 3/4/2023      Subjective      Chief Complaint: Shortness of breath/cough      Patient Reports continued cough, shortness of breath although chronic with patient's history of COPD.  Patient denies any fevers, chills or chest pain or palpitations.    ROS   Negative except as mentioned HPI    Objective      Vitals:   Temp:  [97.7 °F (36.5 °C)-98.1 °F (36.7 °C)] 98.1 °F (36.7 °C)  Heart Rate:  [] 93  Resp:  [19-21] 21  BP: (110-151)/(65-96) 116/76  Flow (L/min):  [4-15] 15    Physical Exam  HENT:      Head: Normocephalic.      Mouth/Throat:      Mouth: Mucous membranes are moist.   Eyes:      Extraocular Movements: Extraocular movements intact.      Pupils: Pupils are equal, round, and reactive to light.   Cardiovascular:      Rate and Rhythm: Normal rate and regular rhythm.   Pulmonary:      Effort: Pulmonary effort is normal.      Breath sounds: Wheezing and rales present.   Abdominal:      General: Bowel sounds are normal. There is no distension.      Palpations: Abdomen is soft.      Tenderness: There is no abdominal tenderness.   Neurological:      General: No focal deficit present.      Mental Status: He is alert and oriented to person, place, and time.            Result Review    Result Review:  I have personally reviewed the results from the time of this admission to 3/5/2023 11:25 EST and agree with these findings:  [x]  Laboratory  []  Microbiology  [x]  Radiology  []  EKG/Telemetry   []  Cardiology/Vascular   []  Pathology  []  Old records  []  Other:  Most notable findings include:           Assessment & Plan      Brief Patient Summary:  Chi Ramirez II is a 62 y.o. male who       amLODIPine, 2.5 mg, Oral, Daily  aspirin, 81 mg, Oral, Daily  atorvastatin, 20 mg, Oral, Daily  cefTRIAXone,  2 g, Intravenous, Q24H  dexamethasone, 6 mg, Oral, Daily   Or  dexamethasone, 6 mg, Intravenous, Daily  doxycycline, 100 mg, Intravenous, Q12H  losartan, 50 mg, Oral, Q24H   And  hydroCHLOROthiazide, 12.5 mg, Oral, Q24H  nicotine, 1 patch, Transdermal, Q24H  remdesivir, 100 mg, Intravenous, Q24H  sodium chloride, 10 mL, Intravenous, Q12H       Pharmacy Consult - Remdesivir,          Active Hospital Problems:  Active Hospital Problems    Diagnosis    • **COVID-19    • Acute exacerbation of chronic obstructive pulmonary disease (COPD) (HCC)    • Chronic pain of both shoulders    • Chronic pain of left knee    • Chronic midline low back pain without sciatica    • Pure hypercholesterolemia    • Tobacco use disorder    • Hypertension      Plan:     COVID-19  Acute hypoxic respiratory failure  -SPO2 81% on room air, currently 100% on continuous BiPAP  -Chest x-ray reviewed, showing bilateral infiltrates consistent with viral pneumonia  -COVID-19 + 3/4/2023  -Continue remdesivir 5-day course  -Continue dexamethasone 6 mg daily  -Empiric ceftriaxone and azithromycin  -Supportive care      Essential Hypertension  -Controlled  -Monitor BP  -Continue home amlodipine, losartan, hydrochlorothiazide, metoprolol,      Hyperlipidemia  -Continue atorvastatin     Chronic pain  -Continue home [place], inspect verified     Tobacco abuse  -Encourage cessation    DVT prophylaxis:  Mechanical DVT prophylaxis orders are present.    CODE STATUS:    Code Status (Patient has no pulse and is not breathing): CPR (Attempt to Resuscitate)  Medical Interventions (Patient has pulse or is breathing): Full Support      Disposition:  I expect patient to be discharged in 5 days.        Electronically signed by Prosper Atkins MD, 03/05/23, 11:25 EST.  [facility] Hospitalist Team

## 2023-03-05 NOTE — H&P
[facility] Medicine Services  History & Physical    Patient Name: Chi Ramirez II  : 1960  MRN: [id number]  Primary Care Physician:  Darell Tello DO  Date of admission: 3/4/2023  Date and Time of Service: 3/4/2023 at 2300    Subjective      Chief Complaint: Shortness of breath    History of Present Illness: Chi Ramirez II is a 62 y.o. male with a past medical history of tobacco abuse, COPD, hypertension, hyperlipidemia, obesity, and chronic pain who presented to [facility] on 3/4/2023 complaining of increasing shortness of breath over the past 3 days.  He also reports a productive cough, the sputum was initially clear but has now turned to a brown-colored sputum.  He reports subjective fevers with chills along with wheezing and pleuritic chest discomfort.  He also reports that his wife checked his oxygen level on a home pulse oximetry prior to coming to the hospital and it was 81%.  He has no other complaints at this time.    In the ED, on room air his SPO2 was 82% which did not improve above 86% even on a Venturi mask at 100% FiO2.  He was then placed on a continuous BiPAP and is currently 100%.  He is afebrile, all other vital signs are stable.  COVID-19 was positive.  WBC is 14.3, magnesium 1.5, glucose 139, CRP 0.52, lactate 0.9, procalcitonin 0.05, initial troponin 17, repeat 14.  Otherwise, labs are unremarkable.  Chest x-ray shows patchy multifocal infiltrates consistent with COVID-19 along with COPD type changes.    12 point ROS reviewed and negative except as mentioned above    Personal History     Past Medical History:   Diagnosis Date   • Allergic rhinitis    • Amputation, toe, traumatic with complication (HCC)     Multiple toes   • Asthma    • Chronic leg pain    • Chronic low back pain    • Condyloma acuminata    • COPD (chronic obstructive pulmonary disease) (HCC)     Did not respond to inhalers   • COVID     10/2022   • Erectile dysfunction    •  Genital herpes    • Hearing loss    • Hyperlipidemia    • Hypersomnia     Felt secondary to sleep apnea which patient will not get tested   • Hypertension    • Kidney stone    • Myalgia    • Obesity    • Osteoarthritis    • Rib fractures    • Vitamin D deficiency        Past Surgical History:   Procedure Laterality Date   • AMPUTATION FOOT / TOE Left     4 toes    • LIPOMA EXCISION     • OTHER SURGICAL HISTORY      LT KNEE MENISCAL TEAR   • OTHER SURGICAL HISTORY Left     SHOULDER ROTATOR CUFF   • VASECTOMY         Family History: family history includes Alcohol abuse in his sister; Aneurysm in his mother; Coronary artery disease in his father and mother; Dementia in his father; Diabetes in his mother and sister; Lung cancer in his father; No Known Problems in his brother and half-sister; Seizures in his sister. Otherwise pertinent FHx was reviewed and not pertinent to current issue.    Social History:  reports that he has been smoking cigarettes. He has a 37.50 pack-year smoking history. He has never used smokeless tobacco. He reports current alcohol use. He reports that he does not use drugs.    Home Medications:  Prior to Admission Medications     Prescriptions Last Dose Informant Patient Reported? Taking?    amLODIPine (NORVASC) 2.5 MG tablet   No No    TAKE ONE TABLET BY MOUTH DAILY    aspirin 81 MG EC tablet   Yes No    Take 81 mg by mouth Daily.    atorvastatin (LIPITOR) 20 MG tablet   No No    TAKE ONE TABLET BY MOUTH DAILY    furosemide (Lasix) 40 MG tablet   No No    Take 1 tablet by mouth Daily for 30 days.    HYDROcodone-acetaminophen (Norco)  MG per tablet   No No    Take 1 tablet by mouth Every 6 (Six) Hours As Needed for Moderate Pain.    HYDROcodone-acetaminophen (Norco)  MG per tablet   No No    Take 1 tablet by mouth Every 6 (Six) Hours As Needed for Moderate Pain.    HYDROcodone-acetaminophen (Norco)  MG per tablet   No No    Take 1 tablet by mouth Every 6 (Six) Hours As Needed  for Moderate Pain.    losartan-hydrochlorothiazide (HYZAAR) 100-25 MG per tablet   No No    Take 1 tablet by mouth Daily.    meloxicam (MOBIC) 15 MG tablet   No No    Take 1 tablet by mouth Daily.    metFORMIN ER (GLUCOPHAGE-XR) 500 MG 24 hr tablet   No No    Take 1 tablet by mouth Daily With Dinner.    metoprolol succinate XL (TOPROL-XL) 50 MG 24 hr tablet   No No    Take 1 tablet by mouth Daily.    multivitamin with minerals tablet tablet   Yes No    Take 1 tablet by mouth Daily.    omeprazole (priLOSEC) 40 MG capsule   No No    TAKE ONE CAPSULE BY MOUTH DAILY        Allergies:  No Known Allergies    Objective      Vitals:   Temp:  [97.7 °F (36.5 °C)] 97.7 °F (36.5 °C)  Heart Rate:  [] 91  Resp:  [19-20] 19  BP: (110-151)/(65-96) 111/66  Flow (L/min):  [4-15] 15    Physical Exam  Vitals and nursing note reviewed.   Constitutional:       Appearance: Normal appearance. He is morbidly obese.      Interventions: Face mask in place.   HENT:      Head: Normocephalic and atraumatic.      Nose: Nose normal.      Mouth/Throat:      Mouth: Mucous membranes are moist.   Eyes:      Extraocular Movements: Extraocular movements intact.      Pupils: Pupils are equal, round, and reactive to light.   Cardiovascular:      Rate and Rhythm: Normal rate and regular rhythm.      Pulses: Normal pulses.      Heart sounds: Normal heart sounds.   Pulmonary:      Effort: Pulmonary effort is normal.      Breath sounds: Examination of the right-upper field reveals wheezing. Examination of the left-upper field reveals wheezing. Examination of the right-lower field reveals wheezing and rhonchi. Examination of the left-lower field reveals wheezing and rhonchi. Wheezing and rhonchi present.   Abdominal:      General: Bowel sounds are normal.      Palpations: Abdomen is soft.   Musculoskeletal:         General: Normal range of motion.      Cervical back: Normal range of motion.   Skin:     General: Skin is warm and dry.   Neurological:       General: No focal deficit present.      Mental Status: He is alert and oriented to person, place, and time. Mental status is at baseline.   Psychiatric:         Mood and Affect: Mood normal.         Behavior: Behavior normal.       Result Review    Result Review:  I have personally reviewed the results from the time of this admission to 3/4/2023 23:52 EST and agree with these findings:  [x]  Laboratory  []  Microbiology  [x]  Radiology  []  EKG/Telemetry   []  Cardiology/Vascular   []  Pathology  []  Old records  []  Other:  Most notable findings include: as above    Assessment & Plan        Active Hospital Problems:  Active Hospital Problems    Diagnosis    • **COVID-19    • Acute exacerbation of chronic obstructive pulmonary disease (COPD) (HCC)    • Chronic pain of both shoulders    • Chronic pain of left knee    • Chronic midline low back pain without sciatica    • Pure hypercholesterolemia    • Tobacco use disorder    • Hypertension      Plan:     COVID-19  -SPO2 81% on room air, currently 100% on continuous BiPAP  -ABG ordered, PH 7.3, CO2 64, HCO3 31.8, PO2 115.4, monitor  -Chest x-ray reviewed  -COVID-19 + 3/4/2023  -Pharmacy consult for remdesivir due to hypoxia  -Dexamethasone ordered  -Risk stratification labs ordered  -Supportive care     Essential Hypertension  -Controlled  -Monitor BP  -Continue home amlodipine, losartan, hydrochlorothiazide, metoprolol,     Hyperlipidemia  -Continue atorvastatin    Chronic pain  -Continue home [place], inspect verified    Tobacco abuse  -Encourage cessation  -Nicotine patch ordered    DVT prophylaxis:  Mechanical DVT prophylaxis orders are present.    CODE STATUS:    Code Status (Patient has no pulse and is not breathing): CPR (Attempt to Resuscitate)  Medical Interventions (Patient has pulse or is breathing): Full Support    Admission Status:  I believe this patient meets inpatient status.    I discussed the patient's findings and my recommendations with  patient.    This patient has been examined wearing appropriate Personal Protective Equipment . 03/04/23      Signature: Electronically signed by Seema Brown DNP, APRN, 03/04/23, 23:52 EST.  [facility] Hospitalist Team

## 2023-03-05 NOTE — PLAN OF CARE
"Goal Outcome Evaluation:   Pt sat up in bed most of the day, eating and drinking well, making urine. Sating 90-94% on 15L O2 nonrebreathing mask. RT at end of shift moved pt to aerovo. Pt appears a bit stronger at end of shift than he did this morning. Pt reports as if he is heading the right direction\".              "

## 2023-03-05 NOTE — ED PROVIDER NOTES
Subjective   History of Present Illness  62-year-old male complaining of increasing shortness of breath over the last 3 days.  He states his cough is initially productive of clear sputum.  He states he is now coughing up brown-colored sputum.  He reports has had subjective fever and shaking chills today.  He reports his feet and ankle area feels a little tight to him.  He reports that he has had no vomiting or diarrhea.  Reports he has had wheezing and some pleuritic chest discomfort.  He states he has previously been diagnosed as having COPD    He states his pulse oximetry using his wife's device was 81% prior to coming to the hospital    Review of Systems   Constitutional: Positive for chills, fatigue and fever.   HENT: Negative for trouble swallowing.    Eyes: Negative for discharge.   Respiratory: Positive for cough, chest tightness and shortness of breath.    Cardiovascular: Positive for chest pain and leg swelling. Negative for palpitations.   Gastrointestinal: Negative for nausea.   Endocrine: Negative for polydipsia, polyphagia and polyuria.   Neurological: Negative for numbness.   All other systems reviewed and are negative.      Past Medical History:   Diagnosis Date   • Allergic rhinitis    • Amputation, toe, traumatic with complication (HCC)     Multiple toes   • Asthma    • Chronic leg pain    • Chronic low back pain    • Condyloma acuminata    • COPD (chronic obstructive pulmonary disease) (HCC)     Did not respond to inhalers   • COVID     10/2022   • Erectile dysfunction    • Genital herpes    • Hearing loss    • Hyperlipidemia    • Hypersomnia     Felt secondary to sleep apnea which patient will not get tested   • Hypertension    • Kidney stone    • Myalgia    • Obesity    • Osteoarthritis    • Rib fractures    • Vitamin D deficiency        No Known Allergies    Past Surgical History:   Procedure Laterality Date   • AMPUTATION FOOT / TOE Left     4 toes    • LIPOMA EXCISION     • OTHER SURGICAL  HISTORY      LT KNEE MENISCAL TEAR   • OTHER SURGICAL HISTORY Left     SHOULDER ROTATOR CUFF   • VASECTOMY         Family History   Problem Relation Age of Onset   • Aneurysm Mother         Brain   • Diabetes Mother    • Coronary artery disease Mother    • Lung cancer Father    • Coronary artery disease Father    • Dementia Father    • Diabetes Sister    • Alcohol abuse Sister    • Seizures Sister    • No Known Problems Brother    • No Known Problems Half-Sister        Social History     Socioeconomic History   • Marital status:    Tobacco Use   • Smoking status: Every Day     Packs/day: 1.50     Years: 25.00     Pack years: 37.50     Types: Cigarettes   • Smokeless tobacco: Never   • Tobacco comments:     stop smokiking    Vaping Use   • Vaping Use: Never used   Substance and Sexual Activity   • Alcohol use: Yes     Comment: rare   • Drug use: Never   • Sexual activity: Not Currently       States that he continues to smoke some    Objective   Physical Exam  Alert Scarlett Coma Scale 15 work of respirations obviously increased in the patient's O2 saturations on 4 L are 87%   HEENT: Pupils equal and reactive to light. Conjunctivae are not injected. Normal tympanic membranes. Oropharynx and nares are normal.   Neck: Supple. Midline trachea. No JVD. No goiter.   Chest: Left-sided rales are noted there are scattered rhonchi and expiratory wheezes bilaterally and equal breath sounds bilaterally, regular rate and rhythm without murmur or rub.  No S3 or S4   Abdomen: Positive bowel sounds, nontender, nondistended. No rebound or peritoneal signs. No CVA tenderness.   Extremities no clubbing. cyanosis or edema. Motor sensory exam is normal. The full range of motion is intact   Skin: Warm and dry, no rashes or petechia.   Lymphatic: No regional lymphadenopathy. No calf pain, swelling or Homans sign    Procedures           ED Course           Labs Reviewed   COVID-19 AND FLU A/B, NP SWAB IN TRANSPORT MEDIA 8-12 HR TAT -  Abnormal; Notable for the following components:       Result Value    COVID19 Detected (*)     All other components within normal limits    Narrative:     Fact sheet for providers: https://www.fda.gov/media/373669/download    Fact sheet for patients: https://www.fda.gov/media/067770/download    Test performed by PCR.  Influenza A and Influenza B negative results should be considered presumptive in samples that have a positive SARS-CoV-2 result.    Competitive inhibition studies showed that SARS-CoV-2 virus, when present at concentrations above 3.6E+04 copies/mL, can inhibit the detection and amplification of influenza A and influenza B virus RNA if present at or below 1.8E+02 copies/mL or 4.9E+02 copies/mL, respectively, and may lead to false negative influenza virus results. If co-infection with influenza A or influenza B virus is suspected in samples with a positive SARS-CoV-2 result, the sample should be re-tested with another FDA cleared, approved, or authorized influenza test, if influenza virus detection would change clinical management.   COMPREHENSIVE METABOLIC PANEL - Abnormal; Notable for the following components:    Chloride 97 (*)     All other components within normal limits    Narrative:     GFR Normal >60  Chronic Kidney Disease <60  Kidney Failure <15     SINGLE HSTROPONIN T - Abnormal; Notable for the following components:    HS Troponin T 17 (*)     All other components within normal limits    Narrative:     High Sensitive Troponin T Reference Range:  <10.0 ng/L- Negative Female for AMI  <15.0 ng/L- Negative Male for AMI  >=10 - Abnormal Female indicating possible myocardial injury.  >=15 - Abnormal Male indicating possible myocardial injury.   Clinicians would have to utilize clinical acumen, EKG, Troponin, and serial changes to determine if it is an Acute Myocardial Infarction or myocardial injury due to an underlying chronic condition.        C-REACTIVE PROTEIN - Abnormal; Notable for the  "following components:    C-Reactive Protein 0.52 (*)     All other components within normal limits   CBC WITH AUTO DIFFERENTIAL - Abnormal; Notable for the following components:    WBC 14.60 (*)     RBC 5.86 (*)     Hemoglobin 18.7 (*)     Hematocrit 57.9 (*)     MCV 98.9 (*)     Neutrophil % 82.0 (*)     Lymphocyte % 11.2 (*)     Neutrophils, Absolute 12.00 (*)     All other components within normal limits   BNP (IN-HOUSE) - Normal    Narrative:     Among patients with dyspnea, NT-proBNP is highly sensitive for the detection of acute congestive heart failure. In addition NT-proBNP of <300 pg/ml effectively rules out acute congestive heart failure with 99% negative predictive value.    Results may be falsely decreased if patient taking Biotin.     PROCALCITONIN - Normal    Narrative:     As a Marker for Sepsis (Non-Neonates):    1. <0.5 ng/mL represents a low risk of severe sepsis and/or septic shock.  2. >2 ng/mL represents a high risk of severe sepsis and/or septic shock.    As a Marker for Lower Respiratory Tract Infections that require antibiotic therapy:    PCT on Admission    Antibiotic Therapy       6-12 Hrs later    >0.5                Strongly Recommended  >0.25 - <0.5        Recommended   0.1 - 0.25          Discouraged              Remeasure/reassess PCT  <0.1                Strongly Discouraged     Remeasure/reassess PCT    As 28 day mortality risk marker: \"Change in Procalcitonin Result\" (>80% or <=80%) if Day 0 (or Day 1) and Day 4 values are available. Refer to http://www.[employer]-pct-calculator.com    Change in PCT <=80%  A decrease of PCT levels below or equal to 80% defines a positive change in PCT test result representing a higher risk for 28-day all-cause mortality of patients diagnosed with severe sepsis for septic shock.    Change in PCT >80%  A decrease of PCT levels of more than 80% defines a negative change in PCT result representing a lower risk for 28-day all-cause mortality of patients " diagnosed with severe sepsis or septic shock.      POC LACTATE - Normal   BLOOD CULTURE   BLOOD CULTURE   RAINBOW DRAW    Narrative:     The following orders were created for panel order [place] Draw.  Procedure                               Abnormality         Status                     ---------                               -----------         ------                     Green Top (Gel)[[id number]]                                  Final result               Lavender Top[[id number]]                                     Final result               Gold Top - SST[[id number]]                                   Final result               Light Blue Top[[id number]]                                   Final result                 Please view results for these tests on the individual orders.   POC LACTATE   GREEN TOP   LAVENDER TOP   GOLD TOP - SST   LIGHT BLUE TOP   CBC AND DIFFERENTIAL    Narrative:     The following orders were created for panel order CBC & Differential.  Procedure                               Abnormality         Status                     ---------                               -----------         ------                     CBC Auto Differential[[id number]]        Abnormal            Final result                 Please view results for these tests on the individual orders.     Medications   doxycycline (VIBRAMYCIN) 100 mg in sodium chloride 0.9 % 100 mL IVPB (100 mg Intravenous New Bag 3/4/23 2007)   cefTRIAXone (ROCEPHIN) 2 g in sodium chloride 0.9 % 100 mL IVPB (0 g Intravenous Stopped 3/4/23 2039)   sodium chloride 0.9 % flush 10 mL (has no administration in time range)   sodium chloride 0.9 % flush 10 mL (has no administration in time range)   sodium chloride 0.9 % infusion 40 mL (has no administration in time range)   acetaminophen (TYLENOL) tablet 650 mg (has no administration in time range)     Or   acetaminophen (TYLENOL) 160 MG/5ML solution 650 mg (has no administration in time range)     Or    acetaminophen (TYLENOL) suppository 650 mg (has no administration in time range)   ondansetron (ZOFRAN) tablet 4 mg (has no administration in time range)     Or   ondansetron (ZOFRAN) injection 4 mg (has no administration in time range)   melatonin tablet 5 mg (has no administration in time range)   nitroglycerin (NITROSTAT) SL tablet 0.4 mg (has no administration in time range)   ipratropium-albuterol (DUO-NEB) nebulizer solution 3 mL (3 mL Nebulization Given 3/4/23 1956)   nitroglycerin (NITROSTAT) ointment 1 inch (1 inch Topical Given 3/4/23 2007)   ondansetron (ZOFRAN) injection 4 mg (4 mg Intravenous Given 3/4/23 2006)   methylPREDNISolone sodium succinate (SOLU-Medrol) injection 125 mg (125 mg Intravenous Given 3/4/23 2006)     No radiology results for the last day                                  Medical Decision Making  To saturation improved into the low 90s with 50% Venturi and optimizer.  The patient was hemodynamically stable.  Patient was treated with IV steroids as well as antibiotics.  The patient will be assessed for novel coronavirus antiviral agents.  He was agreeable to this plan of treatment    Amount and/or Complexity of Data Reviewed  Independent Historian: spouse  External Data Reviewed: notes.     Details: Prehospital pulse oximetry  Labs: ordered. Decision-making details documented in ED Course.  Radiology: ordered and independent interpretation performed. Decision-making details documented in ED Course.  ECG/medicine tests: ordered and independent interpretation performed.  Discussion of management or test interpretation with external provider(s): The case was discussed with the hospitalist provider    Risk  Prescription drug management.  Decision regarding hospitalization.          Final diagnoses:   Acute exacerbation of chronic obstructive pulmonary disease (COPD) (HCC)   Hypoxemia   Pneumonia due to COVID-19 virus   Multifocal pneumonia       ED Disposition  ED Disposition     ED  Disposition   Decision to Admit    Condition   --    Comment   Level of Care: Telemetry [5]   Diagnosis: Acute exacerbation of chronic obstructive pulmonary disease (COPD) ([facility]) [[id number]]   Admitting Physician: ARIAS WATSON [[id number]]   Attending Physician: ARIAS WATSON [[id number]]               No follow-up provider specified.       Medication List      No changes were made to your prescriptions during this visit.          Dl Wilkinson MD  03/04/23 7388

## 2023-03-05 NOTE — PLAN OF CARE
Goal Outcome Evaluation:      Pt arrived to PCU this shift. 2 nurse skin assessment was completed (see Flowsheets. Pt has had slight complaints of pain in back just from laying in bed to which he was helped to position himself better. Pt has been wearing the BIPAP so far this shift and dipping into high 80's frequently. Respiratory therapy has been working with pt and bipap as well. Pt is still getting antibiotics and magnesium as well due to low mag levels (see MAR). Pt is currently resting and VSS so far since arriving to PCU this shift.       Problem: Adult Inpatient Plan of Care  Goal: Plan of Care Review  Outcome: Ongoing, Progressing  Goal: Patient-Specific Goal (Individualized)  Outcome: Ongoing, Progressing  Goal: Absence of Hospital-Acquired Illness or Injury  Outcome: Ongoing, Progressing  Intervention: Identify and Manage Fall Risk  Recent Flowsheet Documentation  Taken 3/5/2023 0237 by Rupa Houston RN  Safety Promotion/Fall Prevention:   assistive device/personal items within reach   clutter free environment maintained   nonskid shoes/slippers when out of bed   activity supervised   room organization consistent   safety round/check completed  Taken 3/5/2023 0038 by Rupa Houston RN  Safety Promotion/Fall Prevention:   activity supervised   assistive device/personal items within reach   clutter free environment maintained   nonskid shoes/slippers when out of bed   safety round/check completed   room organization consistent  Intervention: Prevent Skin Injury  Recent Flowsheet Documentation  Taken 3/5/2023 0038 by Rupa Houston RN  Body Position: supine  Skin Protection: adhesive use limited  Intervention: Prevent and Manage VTE (Venous Thromboembolism) Risk  Recent Flowsheet Documentation  Taken 3/5/2023 0038 by Rupa Houston RN  Activity Management:   activity adjusted per tolerance   activity encouraged  VTE Prevention/Management: patient refused intervention  Range of Motion: active ROM  (range of motion) encouraged  Intervention: Prevent Infection  Recent Flowsheet Documentation  Taken 3/5/2023 0237 by Rupa Houston RN  Infection Prevention:   hand hygiene promoted   personal protective equipment utilized   single patient room provided   rest/sleep promoted   environmental surveillance performed  Taken 3/5/2023 0038 by Rupa Houston RN  Infection Prevention:   hand hygiene promoted   personal protective equipment utilized   rest/sleep promoted   single patient room provided   environmental surveillance performed  Goal: Optimal Comfort and Wellbeing  Outcome: Ongoing, Progressing  Intervention: Provide Person-Centered Care  Recent Flowsheet Documentation  Taken 3/5/2023 0038 by Rupa Houston RN  Trust Relationship/Rapport: care explained  Goal: Readiness for Transition of Care  Outcome: Ongoing, Progressing  Intervention: Mutually Develop Transition Plan  Recent Flowsheet Documentation  Taken 3/5/2023 0246 by Rupa Houston RN  Equipment Currently Used at Home: none  Taken 3/5/2023 0036 by Rupa Houston RN  Transportation Anticipated: family or friend will provide  Patient/Family Anticipated Services at Transition: none  Patient/Family Anticipates Transition to: home with family     Problem: Chest Pain  Goal: Resolution of Chest Pain Symptoms  Outcome: Ongoing, Progressing  Intervention: Manage Acute Chest Pain  Recent Flowsheet Documentation  Taken 3/5/2023 0038 by Rupa Houston RN  Chest Pain Intervention: (nitro paste)   other (see comments)   oxygen applied     Problem: Asthma Comorbidity  Goal: Maintenance of Asthma Control  Outcome: Ongoing, Progressing  Intervention: Maintain Asthma Symptom Control  Recent Flowsheet Documentation  Taken 3/5/2023 0237 by Rupa Houston RN  Medication Review/Management: medications reviewed  Taken 3/5/2023 0038 by Rupa Houston RN  Medication Review/Management: medications reviewed     Problem: Behavioral Health Comorbidity  Goal:  Maintenance of Behavioral Health Symptom Control  Outcome: Ongoing, Progressing  Intervention: Maintain Behavioral Health Symptom Control  Recent Flowsheet Documentation  Taken 3/5/2023 0237 by Rupa Houston RN  Medication Review/Management: medications reviewed  Taken 3/5/2023 0038 by Rupa Houston RN  Medication Review/Management: medications reviewed     Problem: COPD (Chronic Obstructive Pulmonary Disease) Comorbidity  Goal: Maintenance of COPD Symptom Control  Outcome: Ongoing, Progressing  Intervention: Maintain COPD-Symptom Control  Recent Flowsheet Documentation  Taken 3/5/2023 0237 by Rupa Houston RN  Medication Review/Management: medications reviewed  Taken 3/5/2023 0038 by Rupa Houston RN  Medication Review/Management: medications reviewed     Problem: Diabetes Comorbidity  Goal: Blood Glucose Level Within Targeted Range  Outcome: Ongoing, Progressing     Problem: Heart Failure Comorbidity  Goal: Maintenance of Heart Failure Symptom Control  Outcome: Ongoing, Progressing  Intervention: Maintain Heart Failure-Management  Recent Flowsheet Documentation  Taken 3/5/2023 0237 by Rupa Houston RN  Medication Review/Management: medications reviewed  Taken 3/5/2023 0038 by Rupa Houston RN  Medication Review/Management: medications reviewed     Problem: Hypertension Comorbidity  Goal: Blood Pressure in Desired Range  Outcome: Ongoing, Progressing  Intervention: Maintain Blood Pressure Management  Recent Flowsheet Documentation  Taken 3/5/2023 0237 by Rupa Houston RN  Medication Review/Management: medications reviewed  Taken 3/5/2023 0038 by Rupa Houston RN  Medication Review/Management: medications reviewed     Problem: Obstructive Sleep Apnea Risk or Actual Comorbidity Management  Goal: Unobstructed Breathing During Sleep  Outcome: Ongoing, Progressing  Intervention: Monitor and Manage Obstructive Sleep Apnea  Recent Flowsheet Documentation  Taken 3/5/2023 0038 by Rupa Houston  RN  NPPV/CPAP Maintenance: tubes secured     Problem: Osteoarthritis Comorbidity  Goal: Maintenance of Osteoarthritis Symptom Control  Outcome: Ongoing, Progressing  Intervention: Maintain Osteoarthritis Symptom Control  Recent Flowsheet Documentation  Taken 3/5/2023 0237 by Rupa Houston RN  Medication Review/Management: medications reviewed  Taken 3/5/2023 0038 by Rupa Houston RN  Activity Management:   activity adjusted per tolerance   activity encouraged  Medication Review/Management: medications reviewed     Problem: Pain Chronic (Persistent) (Comorbidity Management)  Goal: Acceptable Pain Control and Functional Ability  Outcome: Ongoing, Progressing  Intervention: Manage Persistent Pain  Recent Flowsheet Documentation  Taken 3/5/2023 0237 by Rupa Houston RN  Medication Review/Management: medications reviewed  Taken 3/5/2023 0038 by Rupa Houston RN  Bowel Elimination Promotion: adequate fluid intake promoted  Sleep/Rest Enhancement: relaxation techniques promoted  Medication Review/Management: medications reviewed  Intervention: Optimize Psychosocial Wellbeing  Recent Flowsheet Documentation  Taken 3/5/2023 0038 by Rupa Houston RN  Diversional Activities:   television   smartphone  Family/Support System Care:   presence promoted   self-care encouraged     Problem: Seizure Disorder Comorbidity  Goal: Maintenance of Seizure Control  Outcome: Ongoing, Progressing

## 2023-03-06 LAB
ALBUMIN SERPL-MCNC: 4 G/DL (ref 3.5–5.2)
ALP SERPL-CCNC: 88 U/L (ref 39–117)
ALT SERPL W P-5'-P-CCNC: 20 U/L (ref 1–41)
ANION GAP SERPL CALCULATED.3IONS-SCNC: 8 MMOL/L (ref 5–15)
AST SERPL-CCNC: 21 U/L (ref 1–40)
BASOPHILS # BLD AUTO: 0 10*3/MM3 (ref 0–0.2)
BASOPHILS NFR BLD AUTO: 0.3 % (ref 0–1.5)
BILIRUB CONJ SERPL-MCNC: <0.2 MG/DL (ref 0–0.3)
BILIRUB INDIRECT SERPL-MCNC: NORMAL MG/DL
BILIRUB SERPL-MCNC: 0.2 MG/DL (ref 0–1.2)
BUN SERPL-MCNC: 25 MG/DL (ref 8–23)
BUN/CREAT SERPL: 26.9 (ref 7–25)
CALCIUM SPEC-SCNC: 8.7 MG/DL (ref 8.6–10.5)
CHLORIDE SERPL-SCNC: 97 MMOL/L (ref 98–107)
CO2 SERPL-SCNC: 33 MMOL/L (ref 22–29)
CREAT SERPL-MCNC: 0.93 MG/DL (ref 0.76–1.27)
DEPRECATED RDW RBC AUTO: 50.3 FL (ref 37–54)
EGFRCR SERPLBLD CKD-EPI 2021: 92.8 ML/MIN/1.73
EOSINOPHIL # BLD AUTO: 0 10*3/MM3 (ref 0–0.4)
EOSINOPHIL NFR BLD AUTO: 0.1 % (ref 0.3–6.2)
ERYTHROCYTE [DISTWIDTH] IN BLOOD BY AUTOMATED COUNT: 13.9 % (ref 12.3–15.4)
GLUCOSE SERPL-MCNC: 109 MG/DL (ref 65–99)
HCT VFR BLD AUTO: 55.1 % (ref 37.5–51)
HGB BLD-MCNC: 17.2 G/DL (ref 13–17.7)
LYMPHOCYTES # BLD AUTO: 1.6 10*3/MM3 (ref 0.7–3.1)
LYMPHOCYTES NFR BLD AUTO: 9.8 % (ref 19.6–45.3)
MAGNESIUM SERPL-MCNC: 1.9 MG/DL (ref 1.6–2.4)
MCH RBC QN AUTO: 32.1 PG (ref 26.6–33)
MCHC RBC AUTO-ENTMCNC: 31.3 G/DL (ref 31.5–35.7)
MCV RBC AUTO: 102.8 FL (ref 79–97)
MONOCYTES # BLD AUTO: 1.5 10*3/MM3 (ref 0.1–0.9)
MONOCYTES NFR BLD AUTO: 9.4 % (ref 5–12)
NEUTROPHILS NFR BLD AUTO: 13.2 10*3/MM3 (ref 1.7–7)
NEUTROPHILS NFR BLD AUTO: 80.4 % (ref 42.7–76)
NRBC BLD AUTO-RTO: 0.2 /100 WBC (ref 0–0.2)
PHOSPHATE SERPL-MCNC: 3.8 MG/DL (ref 2.5–4.5)
PLATELET # BLD AUTO: 154 10*3/MM3 (ref 140–450)
PMV BLD AUTO: 8.4 FL (ref 6–12)
POTASSIUM SERPL-SCNC: 4.7 MMOL/L (ref 3.5–5.2)
PROT SERPL-MCNC: 6.6 G/DL (ref 6–8.5)
RBC # BLD AUTO: 5.36 10*6/MM3 (ref 4.14–5.8)
SODIUM SERPL-SCNC: 138 MMOL/L (ref 136–145)
WBC NRBC COR # BLD: 16.4 10*3/MM3 (ref 3.4–10.8)

## 2023-03-06 PROCEDURE — 80048 BASIC METABOLIC PNL TOTAL CA: CPT

## 2023-03-06 PROCEDURE — 36415 COLL VENOUS BLD VENIPUNCTURE: CPT

## 2023-03-06 PROCEDURE — 85025 COMPLETE CBC W/AUTO DIFF WBC: CPT

## 2023-03-06 PROCEDURE — 84100 ASSAY OF PHOSPHORUS: CPT

## 2023-03-06 PROCEDURE — 25010000002 CEFTRIAXONE PER 250 MG: Performed by: EMERGENCY MEDICINE

## 2023-03-06 PROCEDURE — 80076 HEPATIC FUNCTION PANEL: CPT

## 2023-03-06 PROCEDURE — 94799 UNLISTED PULMONARY SVC/PX: CPT

## 2023-03-06 PROCEDURE — 94660 CPAP INITIATION&MGMT: CPT

## 2023-03-06 PROCEDURE — 83735 ASSAY OF MAGNESIUM: CPT

## 2023-03-06 PROCEDURE — 25010000002 REMDESIVIR 100 MG/20ML SOLUTION 1 EACH VIAL

## 2023-03-06 RX ORDER — NAPROXEN SODIUM 220 MG
660 TABLET ORAL EVERY MORNING
COMMUNITY

## 2023-03-06 RX ORDER — NICOTINE 21 MG/24HR
1 PATCH, TRANSDERMAL 24 HOURS TRANSDERMAL
Status: DISCONTINUED | OUTPATIENT
Start: 2023-03-06 | End: 2023-03-09 | Stop reason: HOSPADM

## 2023-03-06 RX ORDER — METOPROLOL SUCCINATE 50 MG/1
50 TABLET, EXTENDED RELEASE ORAL DAILY
Status: DISCONTINUED | OUTPATIENT
Start: 2023-03-06 | End: 2023-03-09 | Stop reason: HOSPADM

## 2023-03-06 RX ORDER — DOCUSATE SODIUM 100 MG/1
700 CAPSULE, LIQUID FILLED ORAL EVERY MORNING
COMMUNITY

## 2023-03-06 RX ORDER — POLYETHYLENE GLYCOL 3350 17 G/17G
17 POWDER, FOR SOLUTION ORAL DAILY
Status: DISCONTINUED | OUTPATIENT
Start: 2023-03-06 | End: 2023-03-09 | Stop reason: HOSPADM

## 2023-03-06 RX ORDER — SUMATRIPTAN 25 MG/1
25 TABLET, FILM COATED ORAL EVERY 8 HOURS PRN
Status: DISCONTINUED | OUTPATIENT
Start: 2023-03-06 | End: 2023-03-09 | Stop reason: HOSPADM

## 2023-03-06 RX ORDER — DOCUSATE SODIUM 100 MG/1
100 CAPSULE, LIQUID FILLED ORAL 2 TIMES DAILY
Status: DISCONTINUED | OUTPATIENT
Start: 2023-03-06 | End: 2023-03-09 | Stop reason: HOSPADM

## 2023-03-06 RX ADMIN — ACETAMINOPHEN 650 MG: 325 TABLET, FILM COATED ORAL at 02:43

## 2023-03-06 RX ADMIN — POLYETHYLENE GLYCOL 3350 17 G: 17 POWDER, FOR SOLUTION ORAL at 10:21

## 2023-03-06 RX ADMIN — GUAIFENESIN 200 MG: 200 SOLUTION ORAL at 02:43

## 2023-03-06 RX ADMIN — HYDROCODONE BITARTRATE AND ACETAMINOPHEN 1 TABLET: 10; 325 TABLET ORAL at 21:38

## 2023-03-06 RX ADMIN — DOXYCYCLINE 100 MG: 100 INJECTION, POWDER, LYOPHILIZED, FOR SOLUTION INTRAVENOUS at 20:29

## 2023-03-06 RX ADMIN — REMDESIVIR 100 MG: 100 INJECTION, POWDER, LYOPHILIZED, FOR SOLUTION INTRAVENOUS at 00:00

## 2023-03-06 RX ADMIN — ASPIRIN 81 MG: 81 TABLET, COATED ORAL at 08:59

## 2023-03-06 RX ADMIN — DEXAMETHASONE 6 MG: 6 TABLET ORAL at 08:59

## 2023-03-06 RX ADMIN — LOSARTAN POTASSIUM 50 MG: 50 TABLET, FILM COATED ORAL at 08:59

## 2023-03-06 RX ADMIN — SUMATRIPTAN SUCCINATE 25 MG: 25 TABLET ORAL at 15:20

## 2023-03-06 RX ADMIN — METOPROLOL SUCCINATE 50 MG: 50 TABLET, EXTENDED RELEASE ORAL at 15:20

## 2023-03-06 RX ADMIN — AMLODIPINE BESYLATE 2.5 MG: 5 TABLET ORAL at 08:59

## 2023-03-06 RX ADMIN — DOXYCYCLINE 100 MG: 100 INJECTION, POWDER, LYOPHILIZED, FOR SOLUTION INTRAVENOUS at 09:00

## 2023-03-06 RX ADMIN — DOCUSATE SODIUM 100 MG: 100 CAPSULE, LIQUID FILLED ORAL at 20:29

## 2023-03-06 RX ADMIN — HYDROCHLOROTHIAZIDE 12.5 MG: 12.5 TABLET ORAL at 08:59

## 2023-03-06 RX ADMIN — REMDESIVIR 100 MG: 100 INJECTION, POWDER, LYOPHILIZED, FOR SOLUTION INTRAVENOUS at 23:50

## 2023-03-06 RX ADMIN — ATORVASTATIN CALCIUM 20 MG: 20 TABLET, FILM COATED ORAL at 08:59

## 2023-03-06 RX ADMIN — Medication 10 ML: at 09:00

## 2023-03-06 RX ADMIN — GUAIFENESIN 200 MG: 200 SOLUTION ORAL at 08:59

## 2023-03-06 RX ADMIN — NICOTINE 1 PATCH: 14 PATCH, EXTENDED RELEASE TRANSDERMAL at 10:21

## 2023-03-06 RX ADMIN — CEFTRIAXONE SODIUM 2 G: 2 INJECTION, POWDER, FOR SOLUTION INTRAMUSCULAR; INTRAVENOUS at 21:59

## 2023-03-06 RX ADMIN — BENZONATATE 200 MG: 100 CAPSULE ORAL at 02:43

## 2023-03-06 RX ADMIN — HYDROCODONE BITARTRATE AND ACETAMINOPHEN 1 TABLET: 10; 325 TABLET ORAL at 15:20

## 2023-03-06 RX ADMIN — DOCUSATE SODIUM 100 MG: 100 CAPSULE, LIQUID FILLED ORAL at 10:21

## 2023-03-06 RX ADMIN — NICOTINE 1 PATCH: 21 PATCH, EXTENDED RELEASE TRANSDERMAL at 09:01

## 2023-03-06 RX ADMIN — HYDROCODONE BITARTRATE AND ACETAMINOPHEN 1 TABLET: 10; 325 TABLET ORAL at 08:59

## 2023-03-06 RX ADMIN — Medication 10 ML: at 21:59

## 2023-03-06 NOTE — PLAN OF CARE
Goal Outcome Evaluation:              Outcome Evaluation: Pt is doing well today, VSS on 40L airvo. pt has c/o of headache-decreased nicotine patch to 14mg and n/o for imitrex q 8hr prn. toprol reordered as well. WBC elevated-pt on decadron. IV doxy continued. pt up to BSC-independent. adequate output using urinal. Pt has chronic back pain and follows pain management-Norco given q 6hrs PRN.

## 2023-03-06 NOTE — PAYOR COMM NOTE
INPATIENT MEDICAL PRECERT REQUEST  CLINICAL REVIEW  3/4/23- ADMIT TO OBSERVATION  3/5/23- ADMIT TO INPATIENT  =================================  UTILIZATION REVIEW  SERGEY SALDANA RN   PH: [phone]  FAX: [phone]    [facility]  NPI# [id number]  TID # [id number]        ===============================  Met (Selected): Reviewed on 3/5/2023 by Karyna Saldana       Created Using Review Status Review Entered   Indicia® Completed 3/5/2023 1807       Created By   Karyna Saldana       Criteria Set Name - Subset   Viral Illness, Acute RRG - Inpatient Care       Selected?   Yes - Inpatient Care is selected for the Viral Illness, Acute RRG criteria set.      Criteria Review      Inpatient Care    Most Recent : Karyna Saldana Most Recent Date: 3/5/2023 18:07:34 EST    (X) Admission is indicated for  1 or more  of the following  [B] [F]:       (X) Pulmonary manifestation, as indicated by  1 or more  of the following :          (X) Hypoxemia          3/5/2023 18:07:34 EST by Karyna Saldana            3/4RA O2 sat 84%  pH 7.304, pO2 115.5, pCO2 64 Placed on Bipap       Definitions    Hypoxemia    (X) Hypoxemia, as indicated by  1 or more  of the following  (1):       (X) Patient without baseline need for supplemental oxygen with oxygen saturation (SaO2) of less       than 90% or arterial blood gas partial pressure of oxygen (PO2) of less than 60 mm Hg (8.0       kPa) on room air [A] [B]       Notes:    3/5/2023 18:07:34 EST by Karyna Saldana    Subject: Admission    Arrives to ED with SoA, COVID+      Temp 98.1      HR 97      Resp 21      B/P 116/76      Os sate 84% on RA      Labs:      pH 7.304, Glucose 139, HS trop 17, C-reactive protein 0.52, Mag 1.5, WBC 14.6      Meds: Decadron IV, Doxycycline IV, Remdesivir IV                NURSING NOTE 3/6 AT 0622: Progress: improving  Patient alert and oriented x4, vitals stable, on airvo 40L, 87%. Tolerating well, IV antibiotics continued, IV remdesivir, patient still  "has some wheezing.     NURSING NOTE 3/5 AT 1738: Pt sat up in bed most of the day, eating and drinking well, making urine. Sating 90-94% on 15L O2 nonrebreathing mask. RT at end of shift moved pt to aerovo. Pt appears a bit stronger at end of shift than he did this morning. Pt reports as if he is heading the right direction\".  =======================================      Chi Jerry II (62 y.o. Male)     Date of Birth   1960    Social Security Number       Address   [address] [place] [facility]    Home Phone   [phone]    MRN   [id number]       [facility]    Marital Status                               Admission Date   3/4/23    Admission Type   Emergency    Admitting Provider   Ina Barney MD    Attending Provider   Prosper Atkins MD    Department, Room/Bed   [facility], 2127/1       Discharge Date       Discharge Disposition       Discharge Destination                               Attending Provider: Prosper Atkins MD    Allergies: No Known Allergies    Isolation: Enh Drop/Con   Infection: COVID (confirmed) (03/04/23)   Code Status: CPR    Ht: 180.3 cm (71\")   Wt: 135 kg (297 lb 6.4 oz)    Admission Cmt: None   Principal Problem: COVID-19 [U07.1]                 Active Insurance as of 3/4/2023     Primary Coverage     Payor Plan Insurance Group Employer/Plan Group    [facility] [id number]     Payor Plan Address Payor Plan Phone Number Payor Plan Fax Number Effective Dates    [address] [address] [phone]  1/1/2018 - None Entered    [facility] [id number]       Subscriber Name Subscriber Birth Date Member ID       CHI JERRY II 1960 [id number]                 Emergency Contacts      (Rel.) Home Phone Work Phone Mobile Phone    TIANA VALERIO (Spouse) [phone] -- --               History & Physical      Seema Brown APRN at 03/04/23 4365     Attestation signed by Ina Barney MD at 03/05/23 3883    I was " available to provide additional medical advice on behalf of the APRN at the time of admission on an as needed basis. I did not participate in a beside evaluation of the patient or provide direct medical care services to the patient.                     [facility] Medicine Services  History & Physical    Patient Name: Chi Ramirez II  : 1960  MRN: [id number]  Primary Care Physician:  Darell Tello DO  Date of admission: 3/4/2023  Date and Time of Service: 3/4/2023 at 2300    Subjective       Chief Complaint: Shortness of breath    History of Present Illness: Chi Ramirez II is a 62 y.o. male with a past medical history of tobacco abuse, COPD, hypertension, hyperlipidemia, obesity, and chronic pain who presented to [facility] on 3/4/2023 complaining of increasing shortness of breath over the past 3 days.  He also reports a productive cough, the sputum was initially clear but has now turned to a brown-colored sputum.  He reports subjective fevers with chills along with wheezing and pleuritic chest discomfort.  He also reports that his wife checked his oxygen level on a home pulse oximetry prior to coming to the hospital and it was 81%.  He has no other complaints at this time.    In the ED, on room air his SPO2 was 82% which did not improve above 86% even on a Venturi mask at 100% FiO2.  He was then placed on a continuous BiPAP and is currently 100%.  He is afebrile, all other vital signs are stable.  COVID-19 was positive.  WBC is 14.3, magnesium 1.5, glucose 139, CRP 0.52, lactate 0.9, procalcitonin 0.05, initial troponin 17, repeat 14.  Otherwise, labs are unremarkable.  Chest x-ray shows patchy multifocal infiltrates consistent with COVID-19 along with COPD type changes.    12 point ROS reviewed and negative except as mentioned above    Personal History     Past Medical History:   Diagnosis Date   • Allergic rhinitis    • Amputation, toe, traumatic with complication (HCC)      Multiple toes   • Asthma    • Chronic leg pain    • Chronic low back pain    • Condyloma acuminata    • COPD (chronic obstructive pulmonary disease) (HCC)     Did not respond to inhalers   • COVID     10/2022   • Erectile dysfunction    • Genital herpes    • Hearing loss    • Hyperlipidemia    • Hypersomnia     Felt secondary to sleep apnea which patient will not get tested   • Hypertension    • Kidney stone    • Myalgia    • Obesity    • Osteoarthritis    • Rib fractures    • Vitamin D deficiency        Past Surgical History:   Procedure Laterality Date   • AMPUTATION FOOT / TOE Left     4 toes    • LIPOMA EXCISION     • OTHER SURGICAL HISTORY      LT KNEE MENISCAL TEAR   • OTHER SURGICAL HISTORY Left     SHOULDER ROTATOR CUFF   • VASECTOMY         Family History: family history includes Alcohol abuse in his sister; Aneurysm in his mother; Coronary artery disease in his father and mother; Dementia in his father; Diabetes in his mother and sister; Lung cancer in his father; No Known Problems in his brother and half-sister; Seizures in his sister. Otherwise pertinent FHx was reviewed and not pertinent to current issue.    Social History:  reports that he has been smoking cigarettes. He has a 37.50 pack-year smoking history. He has never used smokeless tobacco. He reports current alcohol use. He reports that he does not use drugs.    Home Medications:  Prior to Admission Medications     Prescriptions Last Dose Informant Patient Reported? Taking?    amLODIPine (NORVASC) 2.5 MG tablet   No No    TAKE ONE TABLET BY MOUTH DAILY    aspirin 81 MG EC tablet   Yes No    Take 81 mg by mouth Daily.    atorvastatin (LIPITOR) 20 MG tablet   No No    TAKE ONE TABLET BY MOUTH DAILY    furosemide (Lasix) 40 MG tablet   No No    Take 1 tablet by mouth Daily for 30 days.    HYDROcodone-acetaminophen (Norco)  MG per tablet   No No    Take 1 tablet by mouth Every 6 (Six) Hours As Needed for Moderate Pain.     HYDROcodone-acetaminophen (Norco)  MG per tablet   No No    Take 1 tablet by mouth Every 6 (Six) Hours As Needed for Moderate Pain.    HYDROcodone-acetaminophen (Norco)  MG per tablet   No No    Take 1 tablet by mouth Every 6 (Six) Hours As Needed for Moderate Pain.    losartan-hydrochlorothiazide (HYZAAR) 100-25 MG per tablet   No No    Take 1 tablet by mouth Daily.    meloxicam (MOBIC) 15 MG tablet   No No    Take 1 tablet by mouth Daily.    metFORMIN ER (GLUCOPHAGE-XR) 500 MG 24 hr tablet   No No    Take 1 tablet by mouth Daily With Dinner.    metoprolol succinate XL (TOPROL-XL) 50 MG 24 hr tablet   No No    Take 1 tablet by mouth Daily.    multivitamin with minerals tablet tablet   Yes No    Take 1 tablet by mouth Daily.    omeprazole (priLOSEC) 40 MG capsule   No No    TAKE ONE CAPSULE BY MOUTH DAILY        Allergies:  No Known Allergies    Objective       Vitals:   Temp:  [97.7 °F (36.5 °C)] 97.7 °F (36.5 °C)  Heart Rate:  [] 91  Resp:  [19-20] 19  BP: (110-151)/(65-96) 111/66  Flow (L/min):  [4-15] 15    Physical Exam  Vitals and nursing note reviewed.   Constitutional:       Appearance: Normal appearance. He is morbidly obese.      Interventions: Face mask in place.   HENT:      Head: Normocephalic and atraumatic.      Nose: Nose normal.      Mouth/Throat:      Mouth: Mucous membranes are moist.   Eyes:      Extraocular Movements: Extraocular movements intact.      Pupils: Pupils are equal, round, and reactive to light.   Cardiovascular:      Rate and Rhythm: Normal rate and regular rhythm.      Pulses: Normal pulses.      Heart sounds: Normal heart sounds.   Pulmonary:      Effort: Pulmonary effort is normal.      Breath sounds: Examination of the right-upper field reveals wheezing. Examination of the left-upper field reveals wheezing. Examination of the right-lower field reveals wheezing and rhonchi. Examination of the left-lower field reveals wheezing and rhonchi. Wheezing and rhonchi  present.   Abdominal:      General: Bowel sounds are normal.      Palpations: Abdomen is soft.   Musculoskeletal:         General: Normal range of motion.      Cervical back: Normal range of motion.   Skin:     General: Skin is warm and dry.   Neurological:      General: No focal deficit present.      Mental Status: He is alert and oriented to person, place, and time. Mental status is at baseline.   Psychiatric:         Mood and Affect: Mood normal.         Behavior: Behavior normal.       Result Review    Result Review:  I have personally reviewed the results from the time of this admission to 3/4/2023 23:52 EST and agree with these findings:  [x]  Laboratory  []  Microbiology  [x]  Radiology  []  EKG/Telemetry   []  Cardiology/Vascular   []  Pathology  []  Old records  []  Other:  Most notable findings include: as above    Assessment & Plan        Active Hospital Problems:  Active Hospital Problems    Diagnosis    • **COVID-19    • Acute exacerbation of chronic obstructive pulmonary disease (COPD) (HCC)    • Chronic pain of both shoulders    • Chronic pain of left knee    • Chronic midline low back pain without sciatica    • Pure hypercholesterolemia    • Tobacco use disorder    • Hypertension      Plan:     COVID-19  -SPO2 81% on room air, currently 100% on continuous BiPAP  -ABG ordered, PH 7.3, CO2 64, HCO3 31.8, PO2 115.4, monitor  -Chest x-ray reviewed  -COVID-19 + 3/4/2023  -Pharmacy consult for remdesivir due to hypoxia  -Dexamethasone ordered  -Risk stratification labs ordered  -Supportive care     Essential Hypertension  -Controlled  -Monitor BP  -Continue home amlodipine, losartan, hydrochlorothiazide, metoprolol,     Hyperlipidemia  -Continue atorvastatin    Chronic pain  -Continue home [place], inspect verified    Tobacco abuse  -Encourage cessation  -Nicotine patch ordered    DVT prophylaxis:  Mechanical DVT prophylaxis orders are present.    CODE STATUS:    Code Status (Patient has no pulse and is not  breathing): CPR (Attempt to Resuscitate)  Medical Interventions (Patient has pulse or is breathing): Full Support    Admission Status:  I believe this patient meets inpatient status.    I discussed the patient's findings and my recommendations with patient.    This patient has been examined wearing appropriate Personal Protective Equipment . 03/04/23      Signature: Electronically signed by Seema Brown DNP, APRN, 03/04/23, 23:52 EST.  [facility] Hospitalist Team    Electronically signed by Ina Barney MD at 03/05/23 2160          Emergency Department Notes      lD Wilkinson MD at 03/04/23 2002          Subjective   History of Present Illness  62-year-old male complaining of increasing shortness of breath over the last 3 days.  He states his cough is initially productive of clear sputum.  He states he is now coughing up brown-colored sputum.  He reports has had subjective fever and shaking chills today.  He reports his feet and ankle area feels a little tight to him.  He reports that he has had no vomiting or diarrhea.  Reports he has had wheezing and some pleuritic chest discomfort.  He states he has previously been diagnosed as having COPD    He states his pulse oximetry using his wife's device was 81% prior to coming to the hospital    Review of Systems   Constitutional: Positive for chills, fatigue and fever.   HENT: Negative for trouble swallowing.    Eyes: Negative for discharge.   Respiratory: Positive for cough, chest tightness and shortness of breath.    Cardiovascular: Positive for chest pain and leg swelling. Negative for palpitations.   Gastrointestinal: Negative for nausea.   Endocrine: Negative for polydipsia, polyphagia and polyuria.   Neurological: Negative for numbness.   All other systems reviewed and are negative.      Past Medical History:   Diagnosis Date   • Allergic rhinitis    • Amputation, toe, traumatic with complication (HCC)     Multiple toes   • Asthma    • Chronic leg  pain    • Chronic low back pain    • Condyloma acuminata    • COPD (chronic obstructive pulmonary disease) (HCC)     Did not respond to inhalers   • COVID     10/2022   • Erectile dysfunction    • Genital herpes    • Hearing loss    • Hyperlipidemia    • Hypersomnia     Felt secondary to sleep apnea which patient will not get tested   • Hypertension    • Kidney stone    • Myalgia    • Obesity    • Osteoarthritis    • Rib fractures    • Vitamin D deficiency        No Known Allergies    Past Surgical History:   Procedure Laterality Date   • AMPUTATION FOOT / TOE Left     4 toes    • LIPOMA EXCISION     • OTHER SURGICAL HISTORY      LT KNEE MENISCAL TEAR   • OTHER SURGICAL HISTORY Left     SHOULDER ROTATOR CUFF   • VASECTOMY         Family History   Problem Relation Age of Onset   • Aneurysm Mother         Brain   • Diabetes Mother    • Coronary artery disease Mother    • Lung cancer Father    • Coronary artery disease Father    • Dementia Father    • Diabetes Sister    • Alcohol abuse Sister    • Seizures Sister    • No Known Problems Brother    • No Known Problems Half-Sister        Social History     Socioeconomic History   • Marital status:    Tobacco Use   • Smoking status: Every Day     Packs/day: 1.50     Years: 25.00     Pack years: 37.50     Types: Cigarettes   • Smokeless tobacco: Never   • Tobacco comments:     stop smokiking    Vaping Use   • Vaping Use: Never used   Substance and Sexual Activity   • Alcohol use: Yes     Comment: rare   • Drug use: Never   • Sexual activity: Not Currently       States that he continues to smoke some    Objective   Physical Exam  Alert [place] Coma Scale 15 work of respirations obviously increased in the patient's O2 saturations on 4 L are 87%   HEENT: Pupils equal and reactive to light. Conjunctivae are not injected. Normal tympanic membranes. Oropharynx and nares are normal.   Neck: Supple. Midline trachea. No JVD. No goiter.   Chest: Left-sided rales are noted  there are scattered rhonchi and expiratory wheezes bilaterally and equal breath sounds bilaterally, regular rate and rhythm without murmur or rub.  No S3 or S4   Abdomen: Positive bowel sounds, nontender, nondistended. No rebound or peritoneal signs. No CVA tenderness.   Extremities no clubbing. cyanosis or edema. Motor sensory exam is normal. The full range of motion is intact   Skin: Warm and dry, no rashes or petechia.   Lymphatic: No regional lymphadenopathy. No calf pain, swelling or Homans sign    Procedures          ED Course           Labs Reviewed   COVID-19 AND FLU A/B, NP SWAB IN TRANSPORT MEDIA 8-12 HR TAT - Abnormal; Notable for the following components:       Result Value    COVID19 Detected (*)     All other components within normal limits    Narrative:     Fact sheet for providers: https://www.fda.gov/media/389227/download    Fact sheet for patients: https://www.fda.gov/media/800606/download    Test performed by PCR.  Influenza A and Influenza B negative results should be considered presumptive in samples that have a positive SARS-CoV-2 result.    Competitive inhibition studies showed that SARS-CoV-2 virus, when present at concentrations above 3.6E+04 copies/mL, can inhibit the detection and amplification of influenza A and influenza B virus RNA if present at or below 1.8E+02 copies/mL or 4.9E+02 copies/mL, respectively, and may lead to false negative influenza virus results. If co-infection with influenza A or influenza B virus is suspected in samples with a positive SARS-CoV-2 result, the sample should be re-tested with another FDA cleared, approved, or authorized influenza test, if influenza virus detection would change clinical management.   COMPREHENSIVE METABOLIC PANEL - Abnormal; Notable for the following components:    Chloride 97 (*)     All other components within normal limits    Narrative:     GFR Normal >60  Chronic Kidney Disease <60  Kidney Failure <15     SINGLE HSTROPONIN T -  Abnormal; Notable for the following components:    HS Troponin T 17 (*)     All other components within normal limits    Narrative:     High Sensitive Troponin T Reference Range:  <10.0 ng/L- Negative Female for AMI  <15.0 ng/L- Negative Male for AMI  >=10 - Abnormal Female indicating possible myocardial injury.  >=15 - Abnormal Male indicating possible myocardial injury.   Clinicians would have to utilize clinical acumen, EKG, Troponin, and serial changes to determine if it is an Acute Myocardial Infarction or myocardial injury due to an underlying chronic condition.        C-REACTIVE PROTEIN - Abnormal; Notable for the following components:    C-Reactive Protein 0.52 (*)     All other components within normal limits   CBC WITH AUTO DIFFERENTIAL - Abnormal; Notable for the following components:    WBC 14.60 (*)     RBC 5.86 (*)     Hemoglobin 18.7 (*)     Hematocrit 57.9 (*)     MCV 98.9 (*)     Neutrophil % 82.0 (*)     Lymphocyte % 11.2 (*)     Neutrophils, Absolute 12.00 (*)     All other components within normal limits   BNP (IN-HOUSE) - Normal    Narrative:     Among patients with dyspnea, NT-proBNP is highly sensitive for the detection of acute congestive heart failure. In addition NT-proBNP of <300 pg/ml effectively rules out acute congestive heart failure with 99% negative predictive value.    Results may be falsely decreased if patient taking Biotin.     PROCALCITONIN - Normal    Narrative:     As a Marker for Sepsis (Non-Neonates):    1. <0.5 ng/mL represents a low risk of severe sepsis and/or septic shock.  2. >2 ng/mL represents a high risk of severe sepsis and/or septic shock.    As a Marker for Lower Respiratory Tract Infections that require antibiotic therapy:    PCT on Admission    Antibiotic Therapy       6-12 Hrs later    >0.5                Strongly Recommended  >0.25 - <0.5        Recommended   0.1 - 0.25          Discouraged              Remeasure/reassess PCT  <0.1                Strongly  "Discouraged     Remeasure/reassess PCT    As 28 day mortality risk marker: \"Change in Procalcitonin Result\" (>80% or <=80%) if Day 0 (or Day 1) and Day 4 values are available. Refer to http://www.[facility]-pct-calculator.com    Change in PCT <=80%  A decrease of PCT levels below or equal to 80% defines a positive change in PCT test result representing a higher risk for 28-day all-cause mortality of patients diagnosed with severe sepsis for septic shock.    Change in PCT >80%  A decrease of PCT levels of more than 80% defines a negative change in PCT result representing a lower risk for 28-day all-cause mortality of patients diagnosed with severe sepsis or septic shock.      POC LACTATE - Normal   BLOOD CULTURE   BLOOD CULTURE   RAINBOW DRAW    Narrative:     The following orders were created for panel order [place] Draw.  Procedure                               Abnormality         Status                     ---------                               -----------         ------                     Green Top (Gel)[[id number]]                                  Final result               Lavender Top[[id number]]                                     Final result               Gold Top - SST[[id number]]                                   Final result               Light Blue Top[[id number]]                                   Final result                 Please view results for these tests on the individual orders.   POC LACTATE   GREEN TOP   LAVENDER TOP   GOLD TOP - SST   LIGHT BLUE TOP   CBC AND DIFFERENTIAL    Narrative:     The following orders were created for panel order CBC & Differential.  Procedure                               Abnormality         Status                     ---------                               -----------         ------                     CBC Auto Differential[[id number]]        Abnormal            Final result                 Please view results for these tests on the individual orders.     Medications "   doxycycline (VIBRAMYCIN) 100 mg in sodium chloride 0.9 % 100 mL IVPB (100 mg Intravenous New Bag 3/4/23 2007)   cefTRIAXone (ROCEPHIN) 2 g in sodium chloride 0.9 % 100 mL IVPB (0 g Intravenous Stopped 3/4/23 2039)   sodium chloride 0.9 % flush 10 mL (has no administration in time range)   sodium chloride 0.9 % flush 10 mL (has no administration in time range)   sodium chloride 0.9 % infusion 40 mL (has no administration in time range)   acetaminophen (TYLENOL) tablet 650 mg (has no administration in time range)     Or   acetaminophen (TYLENOL) 160 MG/5ML solution 650 mg (has no administration in time range)     Or   acetaminophen (TYLENOL) suppository 650 mg (has no administration in time range)   ondansetron (ZOFRAN) tablet 4 mg (has no administration in time range)     Or   ondansetron (ZOFRAN) injection 4 mg (has no administration in time range)   melatonin tablet 5 mg (has no administration in time range)   nitroglycerin (NITROSTAT) SL tablet 0.4 mg (has no administration in time range)   ipratropium-albuterol (DUO-NEB) nebulizer solution 3 mL (3 mL Nebulization Given 3/4/23 1956)   nitroglycerin (NITROSTAT) ointment 1 inch (1 inch Topical Given 3/4/23 2007)   ondansetron (ZOFRAN) injection 4 mg (4 mg Intravenous Given 3/4/23 2006)   methylPREDNISolone sodium succinate (SOLU-Medrol) injection 125 mg (125 mg Intravenous Given 3/4/23 2006)     No radiology results for the last day                                  Medical Decision Making  To saturation improved into the low 90s with 50% Venturi and optimizer.  The patient was hemodynamically stable.  Patient was treated with IV steroids as well as antibiotics.  The patient will be assessed for novel coronavirus antiviral agents.  He was agreeable to this plan of treatment    Amount and/or Complexity of Data Reviewed  Independent Historian: [email or web address]  External Data Reviewed: notes.     Details: Prehospital pulse oximetry  Labs: ordered. Decision-making details  documented in ED Course.  Radiology: ordered and independent interpretation performed. Decision-making details documented in ED Course.  ECG/medicine tests: ordered and independent interpretation performed.  Discussion of management or test interpretation with external provider(s): The case was discussed with the hospitalist provider    Risk  Prescription drug management.  Decision regarding hospitalization.          Final diagnoses:   Acute exacerbation of chronic obstructive pulmonary disease (COPD) ([facility])   Hypoxemia   Pneumonia due to COVID-19 virus   Multifocal pneumonia       ED Disposition  ED Disposition     ED Disposition   Decision to Admit    Condition   --    Comment   Level of Care: Telemetry [5]   Diagnosis: Acute exacerbation of chronic obstructive pulmonary disease (COPD) ([facility]) [127520]   Admitting Physician: ARIAS WATSON [[id number]]   Attending Physician: ARIAS WATSON [[id number]]               No follow-up provider specified.       Medication List      No changes were made to your prescriptions during this visit.          Dl Wilkinson MD  03/04/23 2141      Electronically signed by Dl Wilkinson MD at 03/04/23 2141         Respiratory Therapy (last 48 hours)     Respiratory Therapy Flowsheet     Row Name 03/06/23 1004 03/06/23 0735 03/06/23 0600 03/06/23 0400 03/06/23 0200       $ Oximetry Charges    $ O2 Pt/System Assessment -- yes -- -- --       Oxygen Therapy    SpO2 -- 95 % 92 % -- 100 %    Pulse Oximetry Type -- Continuous -- -- --    Device (Oxygen Therapy) -- heated;high-flow nasal cannula -- -- heated;high-flow mask;humidified    Daily Review of Necessity (O2 Therapy) -- completed -- -- --    Flow (L/min) -- 40 -- -- 40    Oxygen Concentration (%) -- 85 -- -- 87    SF Ratio 94 -- -- -- --       Vital Signs    Temp -- -- 98.1 °F (36.7 °C) -- 98.2 °F (36.8 °C)    Temp src -- -- Oral -- Oral    Pulse -- 73 102 -- 79    Heart Rate Source -- -- Monitor -- Monitor    Resp -- -- 18 -- 16     Resp Rate Source -- -- Monitor -- Monitor    BP -- -- 135/76 -- 139/81    Noninvasive MAP (mmHg) -- -- 88 -- 87    BP Location -- -- Right arm -- Right arm    BP Method -- -- Automatic -- Automatic    Patient Position -- -- Sitting -- Lying       Assessment    Respiratory WDL -- -- -- .WDL except;breath sounds;cough;effort/expansion;rhythm/pattern --    Expansion/Accessory Muscles/Retractions -- -- -- no retractions;expansion symmetric --    Cough Frequency -- -- -- frequent --    Cough Type -- -- -- productive --    Skin Integrity -- -- -- bruised (ecchymotic);other (see comments) --       Breath Sounds    Breath Sounds -- -- -- All Fields --    All Lung Fields Breath Sounds -- -- -- wheezes, inspiratory;wheezes, expiratory --    Row Name 03/06/23 0048 03/06/23 0000 03/05/23 2300 03/05/23 2203 03/05/23 4717       $ Oximetry Charges    $ O2 Pt/System Assessment yes -- -- -- yes    $ High Flow Nasal Cannula Set-Up yes -- -- -- --       Oxygen Therapy    SpO2 99 % -- -- -- 94 %    Pulse Oximetry Type Continuous -- -- -- Continuous    Device (Oxygen Therapy) -- -- -- -- heated;high-flow nasal cannula    Flow (L/min) 40 -- -- -- 40    Oxygen Concentration (%) 87 -- -- -- 88    SF Ratio -- -- -- 83 --       Vital Signs    Pulse -- -- 89 -- 96    Heart Rate Source -- -- Monitor -- --    Resp -- -- 25 -- --    Resp Rate Source -- -- Monitor -- --    BP -- -- 134/84 -- --    Noninvasive MAP (mmHg) -- -- 100 -- --    BP Location -- -- Right arm -- --    BP Method -- -- Automatic -- --    Patient Position -- -- Lying -- --       Assessment    Respiratory WDL -- .WDL except;breath sounds;cough;effort/expansion;rhythm/pattern -- -- --    Expansion/Accessory Muscles/Retractions -- no retractions;expansion symmetric -- -- --    Cough Frequency -- frequent -- -- --    Cough Type -- productive -- -- --    Skin Integrity -- bruised (ecchymotic);other (see comments) -- -- --       Breath Sounds    Breath Sounds -- All Fields -- -- --     All Lung Fields Breath Sounds -- wheezes, inspiratory;wheezes, expiratory -- -- --    Row Name 03/05/23 1900 03/05/23 1815 03/05/23 1803 03/05/23 1800 03/05/23 1700       Oxygen Therapy    SpO2 -- 97 % -- 94 % 97 %    SF Ratio -- -- 94 -- --       Vital Signs    Pulse -- 95 -- 92 85    BP -- -- -- 129/76 128/65    Noninvasive MAP (mmHg) -- -- -- 89 86       Assessment    Respiratory WDL .WDL except;breath sounds;cough;effort/expansion;rhythm/pattern -- -- -- --    Expansion/Accessory Muscles/Retractions no retractions;expansion symmetric -- -- -- --    Cough Frequency frequent -- -- -- --    Cough Type productive -- -- -- --    Skin Integrity bruised (ecchymotic);other (see comments) -- -- -- --       Breath Sounds    Breath Sounds All Fields -- -- -- --    All Lung Fields Breath Sounds wheezes, inspiratory;wheezes, expiratory -- -- -- --       Treatment/Therapy    Cough And Deep Breathing done independently per patient -- -- -- --       Care Plan Interventions    Supportive Measures active listening utilized -- -- -- --    Skin Protection adhesive use limited;tubing/devices free from skin contact -- -- -- --    Row Name 03/05/23 1651 03/05/23 1640 03/05/23 1638 03/05/23 1608 03/05/23 1600       $ Oximetry Charges    $ O2 Pt/System Assessment yes -- -- -- --       Oxygen Therapy    SpO2 95 % 94 % -- 94 % 96 %    Pulse Oximetry Type Continuous -- -- Continuous --    Device (Oxygen Therapy) humidified;heated;high-flow nasal cannula -- nonrebreather mask nonrebreather mask --    Flow (L/min) 40 -- 15 15 --    Oxygen Concentration (%) 85 -- -- -- --       Vital Signs    Temp -- -- -- 96.6 °F (35.9 °C) --    Temp src -- -- -- Oral --    Pulse 92 97 -- 90 88    Heart Rate Source Monitor -- -- Monitor --    Resp 16 -- -- 20 --    Resp Rate Source Visual -- -- Monitor --    BP -- -- -- 134/83 133/84    Noninvasive MAP (mmHg) -- -- -- 97 93       Assessment    Respiratory WDL -- -- .WDL except;breath  sounds;cough;effort/expansion;rhythm/pattern -- --    Rhythm/Pattern, Respiratory tachypneic -- shortness of breath -- --    Expansion/Accessory Muscles/Retractions -- -- abdominal muscle use;no retractions;expansion symmetric -- --    Cough Frequency -- -- frequent -- --    Cough Type -- -- dry;nonproductive -- --    Skin Integrity -- -- bruised (ecchymotic);other (see comments)  redness on ears -- --       Breath Sounds    Breath Sounds -- -- All Fields -- --    All Lung Fields Breath Sounds -- -- wheezes, inspiratory;wheezes, expiratory -- --    Row Name 03/05/23 1500 03/05/23 1404 03/05/23 1400 03/05/23 1346 03/05/23 1300       Oxygen Therapy    SpO2 92 % -- 93 % 93 % 95 %    Pulse Oximetry Type -- -- -- Continuous --    Device (Oxygen Therapy) -- -- -- nonrebreather mask --    Flow (L/min) -- -- -- 15 --    SF Ratio -- 80 -- -- --       Vital Signs    Temp -- -- -- 97.8 °F (36.6 °C) --    Temp src -- -- -- Oral --    Pulse 87 -- 94 102 90    Heart Rate Source -- -- -- Monitor --    Resp -- -- -- 20 --    Resp Rate Source -- -- -- Monitor --    /83 -- 119/86 138/81 138/81    Noninvasive MAP (mmHg) 97 -- 98 100 100    Row Name 03/05/23 1230 03/05/23 1200 03/05/23 1153 03/05/23 1150 03/05/23 1148       $ Oximetry Charges    $ O2 Pt/System Assessment yes -- -- -- --       Oxygen Therapy    SpO2 95 % 94 % -- 94 % --    Pulse Oximetry Type Continuous -- -- -- --    Device (Oxygen Therapy) nonrebreather mask -- nonrebreather mask -- --    Flow (L/min) 15 -- 15 -- --    Oxygen Concentration (%) 100 -- -- -- --       Vital Signs    Pulse -- 85 -- 90 86    BP -- 124/76 -- 130/72 130/72    Noninvasive MAP (mmHg) -- 87 -- 91 --       Assessment    Respiratory WDL -- -- .WDL except;breath sounds;cough;effort/expansion;rhythm/pattern -- --    Rhythm/Pattern, Respiratory -- -- shortness of breath -- --    Expansion/Accessory Muscles/Retractions -- -- abdominal muscle use;no retractions;expansion symmetric -- --    Cough  Frequency -- -- frequent -- --    Cough Type -- -- dry;nonproductive -- --    Skin Integrity -- -- bruised (ecchymotic);other (see comments)  redness on ears -- --       Breath Sounds    Breath Sounds -- -- All Fields -- --    All Lung Fields Breath Sounds -- -- wheezes, inspiratory;wheezes, expiratory -- --    Row Name 03/05/23 1134 03/05/23 1100 03/05/23 1003 03/05/23 1000 03/05/23 0945       Oxygen Therapy    SpO2 94 % 94 % -- 90 % 92 %    Pulse Oximetry Type Continuous -- -- -- --    Device (Oxygen Therapy) nonrebreather mask -- -- -- --    Flow (L/min) 15 -- -- -- --    SF Ratio -- -- 81 -- --       Vital Signs    Temp 98 °F (36.7 °C) -- -- -- --    Temp src Oral -- -- -- --    Pulse 86 88 -- 97 101    Heart Rate Source Monitor -- -- -- --    Resp 28 -- -- -- --    Resp Rate Source Monitor -- -- -- --    /81 140/81 -- 123/72 114/77    Noninvasive MAP (mmHg) 95 95 -- 87 85    BP Location Right arm -- -- -- --    BP Method Automatic -- -- -- --    Patient Position Sitting -- -- -- --    Row Name 03/05/23 0915 03/05/23 0900 03/05/23 0830 03/05/23 0815 03/05/23 0800       $ Oximetry Charges    $ O2 Pt/System Assessment -- yes -- -- --       Oxygen Therapy    SpO2 94 % 94 % 95 % 94 % 92 %    Pulse Oximetry Type -- Continuous -- -- --    Device (Oxygen Therapy) -- nonrebreather mask -- -- --    Flow (L/min) -- 15 -- -- --       Vital Signs    Pulse 87 93 89 88 84    /72 135/79 99/63 127/79 131/82    Noninvasive MAP (mmHg) 86 89 75 90 92    Row Name 03/05/23 0747 03/05/23 0745 03/05/23 0730 03/05/23 0715 03/05/23 0700       Oxygen Therapy    SpO2 -- 93 % 92 % 92 % 92 %    Device (Oxygen Therapy) nonrebreather mask -- -- -- --    Flow (L/min) 15 -- -- -- --       Vital Signs    Pulse -- 81 87 80 107    BP -- 137/74 128/71 123/71 118/74    Noninvasive MAP (mmHg) -- 88 83 88 91       Assessment    Respiratory WDL .WDL except;breath sounds;cough;effort/expansion;rhythm/pattern -- -- -- --    Rhythm/Pattern,  Respiratory shortness of breath -- -- -- --    Expansion/Accessory Muscles/Retractions abdominal muscle use;no retractions;expansion symmetric -- -- -- --    Cough Frequency frequent -- -- -- --    Cough Type dry;nonproductive -- -- -- --    Skin Integrity bruised (ecchymotic);other (see comments)  redness on ears -- -- -- --       Breath Sounds    Breath Sounds All Fields -- -- -- --    All Lung Fields Breath Sounds wheezes, inspiratory;wheezes, expiratory -- -- -- --    Row Name 03/05/23 0603 03/05/23 0415 03/05/23 0237 03/05/23 0230 03/05/23 0221       Oxygen Therapy    SpO2 -- -- -- 88 % --    Pulse Oximetry Type -- -- -- Continuous --    Device (Oxygen Therapy) -- -- -- NPPV/NIV --    Oxygen Concentration (%) -- -- -- -- 90    SF Ratio 60 -- -- -- --       Vital Signs    Temp -- -- -- 98.1 °F (36.7 °C) --    Temp src -- -- -- Axillary --    Pulse -- -- -- 97 --    Heart Rate Source -- -- -- Monitor --    Resp -- -- -- 21 --    Resp Rate Source -- -- -- Monitor --    BP -- -- -- 116/76 --    Noninvasive MAP (mmHg) -- -- -- 85 --    BP Location -- -- -- Right arm --    BP Method -- -- -- Automatic --    Patient Position -- -- -- Lying --       Assessment    Respiratory WDL -- .WDL except;breath sounds;cough;effort/expansion;rhythm/pattern -- -- --    Rhythm/Pattern, Respiratory -- shortness of breath -- -- --    Expansion/Accessory Muscles/Retractions -- abdominal muscle use;no retractions;expansion symmetric -- -- --    Cough Frequency -- frequent -- -- --    Cough Type -- dry;nonproductive -- -- --    Skin Integrity -- -- -- -- --       Breath Sounds    Breath Sounds -- All Fields -- -- --    All Lung Fields Breath Sounds -- wheezes, inspiratory;wheezes, expiratory -- -- --       Treatment/Therapy    Cough And Deep Breathing -- done independently per patient -- -- --       Care Plan Interventions    Skin Protection -- adhesive use limited -- -- --    NPPV/CPAP Maintenance -- tubes secured -- -- --    Row Name  03/05/23 0203 03/05/23 0115 03/05/23 0104 03/05/23 0038 03/04/23 2346       $ Oximetry Charges    $ O2 Pt/System Assessment -- yes -- -- --    $ Pulse Oximeter, Continuous (RT) -- yes -- -- --       Oxygen Therapy    SpO2 -- 95 % -- -- 92 %    Pulse Oximetry Type -- Continuous -- -- --    Device (Oxygen Therapy) -- NPPV/NIV -- NPPV/NIV --    Daily Review of Necessity (O2 Therapy) -- -- -- completed --    Oxygen Concentration (%) -- -- 80 -- --    PF Ratio 115 -- -- -- --    SF Ratio 100 -- -- -- --       Vital Signs    Pulse -- 88 -- -- 91    BP -- 127/78 -- -- 111/66    Noninvasive MAP (mmHg) -- 94 -- -- 78       Assessment    Respiratory WDL -- -- -- .WDL except;breath sounds;cough;effort/expansion;rhythm/pattern --    Rhythm/Pattern, Respiratory -- -- -- shortness of breath --    Expansion/Accessory Muscles/Retractions -- -- -- abdominal muscle use;no retractions;expansion symmetric --    Cough Frequency -- -- -- frequent --    Cough Type -- -- -- dry;nonproductive --    Skin Integrity -- -- -- bruised (ecchymotic);other (see comments)  redness on ears --       Breath Sounds    Breath Sounds -- -- -- All Fields --    All Lung Fields Breath Sounds -- -- -- wheezes, inspiratory;wheezes, expiratory --       Treatment/Therapy    Cough And Deep Breathing -- -- -- done independently per patient --       Care Plan Interventions    Skin Protection -- -- -- adhesive use limited --    NPPV/CPAP Maintenance -- -- -- tubes secured --    Row Name 03/04/23 2316 03/04/23 2301 03/04/23 2246 03/04/23 2231 03/04/23 2217       Oxygen Therapy    SpO2 84 % 88 % 89 % 100 % 100 %       Vital Signs    Pulse 94 99 96 96 94    /65 113/79 110/71 114/67 135/79    Noninvasive MAP (mmHg) 78 86 81 79 84    Row Name 03/04/23 2203 03/04/23 2159 03/04/23 2144 03/04/23 2129 03/04/23 2114       Oxygen Therapy    SpO2 -- 98 % 100 % 98 % 99 %    SF Ratio 51 -- -- -- --       Vital Signs    Pulse -- 94 104 95 99    BP -- 124/79 126/80 126/74  131/79    Noninvasive MAP (mmHg) -- 89 90 86 91    Row Name 03/04/23 2059 03/04/23 2044 03/04/23 2036 03/04/23 2031 03/04/23 2017       Oxygen Therapy    SpO2 86 % 84 % 86 % 83 % 85 %    Oxygen Concentration (%) 100 -- -- -- --       Vital Signs    Pulse 94 92 107 96 109    /87 122/81 135/81 132/81 144/94    Noninvasive MAP (mmHg) 97 88 90 98 103    Row Name 03/04/23 2002 03/04/23 1956 03/04/23 1936 03/04/23 19:33:50 03/04/23 1918       $ Oximetry Charges    $ O2 Pt/System Assessment -- yes -- -- --    $ Pulse Oximeter, Continuous (RT) -- yes -- -- --       Oxygen Therapy    SpO2 83 % 81 % 85 % -- 86 %    Pulse Oximetry Type -- Continuous -- -- --    Device (Oxygen Therapy) -- Venturi mask system nasal cannula with ETCO2 -- nasal cannula    Daily Review of Necessity (O2 Therapy) -- completed -- -- --    Flow (L/min) -- 15 5 -- 4    Oxygen Concentration (%) -- 50 -- -- --    ETCO2 (mmHg) -- -- 34 mmHg -- --       Vital Signs    Pulse 102 105 119 -- --    Heart Rate Source -- Monitor -- -- --    Resp -- 19 -- -- --    Resp Rate Source -- Monitor -- -- --    /86 -- 140/96 -- 151/91    Noninvasive MAP (mmHg) 101 -- 105 -- 114    BP Location -- -- -- -- Left arm    BP Method -- -- -- -- Automatic    Patient Position -- -- -- -- Sitting       Assessment    Respiratory WDL -- .WDL except;breath sounds;cough;effort/expansion -- -- --    Rhythm/Pattern, Respiratory -- shortness of breath -- shortness of breath --    Expansion/Accessory Muscles/Retractions -- abdominal muscle use -- -- --    Cough Frequency -- frequent -- -- --    Cough Type -- good;congested -- productive --       Breath Sounds    Breath Sounds -- All Fields -- All Fields --    All Lung Fields Breath Sounds -- wheezes, expiratory;wheezes, inspiratory -- wheezes, inspiratory --       Breath Sounds Post-Respiratory Treatment    Breath Sounds Posttreatment All Fields All Fields -- -- -- --    Breath Sounds Posttreatment All Fields no change -- -- --  --       Aerosol Therapy (SVN)    $ Mini Neb Initial -- yes -- -- --    Daily Review of Necessity (SVN) -- completed -- -- --    Respiratory Treatment Status (SVN) -- given -- -- --    Treatment Route (SVN) -- mask;air -- -- --    Patient Position -- Smith's -- -- --    Posttreatment Assessment (SVN) breath sounds unchanged -- -- -- --    Signs of Intolerance (SVN) none -- -- -- --    Row Name 23 1917                   Oxygen Therapy    SpO2 82 %        Device (Oxygen Therapy) room air           Vital Signs    Temp 97.7 °F (36.5 °C)        Temp src Temporal        Pulse 101        Heart Rate Source Monitor        Resp 20                     Physician Progress Notes (last 24 hours)      Prosper Atkins MD at 23 1125              [facility] Medicine Services Daily Progress Note    Patient Name: Chi Ramirez II  : 1960  MRN: [id number]  Primary Care Physician:  Darell Tello DO  Date of admission: 3/4/2023      Subjective       Chief Complaint: Shortness of breath/cough      Patient Reports continued cough, shortness of breath although chronic with patient's history of COPD.  Patient denies any fevers, chills or chest pain or palpitations.    ROS   Negative except as mentioned HPI    Objective       Vitals:   Temp:  [97.7 °F (36.5 °C)-98.1 °F (36.7 °C)] 98.1 °F (36.7 °C)  Heart Rate:  [] 93  Resp:  [19-21] 21  BP: (110-151)/(65-96) 116/76  Flow (L/min):  [4-15] 15    Physical Exam  HENT:      Head: Normocephalic.      Mouth/Throat:      Mouth: Mucous membranes are moist.   Eyes:      Extraocular Movements: Extraocular movements intact.      Pupils: Pupils are equal, round, and reactive to light.   Cardiovascular:      Rate and Rhythm: Normal rate and regular rhythm.   Pulmonary:      Effort: Pulmonary effort is normal.      Breath sounds: Wheezing and rales present.   Abdominal:      General: Bowel sounds are normal. There is no distension.      Palpations:  Abdomen is soft.      Tenderness: There is no abdominal tenderness.   Neurological:      General: No focal deficit present.      Mental Status: He is alert and oriented to person, place, and time.            Result Review    Result Review:  I have personally reviewed the results from the time of this admission to 3/5/2023 11:25 EST and agree with these findings:  [x]  Laboratory  []  Microbiology  [x]  Radiology  []  EKG/Telemetry   []  Cardiology/Vascular   []  Pathology  []  Old records  []  Other:  Most notable findings include:           Assessment & Plan      Brief Patient Summary:  Chi Ramirez II is a 62 y.o. male who       amLODIPine, 2.5 mg, Oral, Daily  aspirin, 81 mg, Oral, Daily  atorvastatin, 20 mg, Oral, Daily  cefTRIAXone, 2 g, Intravenous, Q24H  dexamethasone, 6 mg, Oral, Daily   Or  dexamethasone, 6 mg, Intravenous, Daily  doxycycline, 100 mg, Intravenous, Q12H  losartan, 50 mg, Oral, Q24H   And  hydroCHLOROthiazide, 12.5 mg, Oral, Q24H  nicotine, 1 patch, Transdermal, Q24H  remdesivir, 100 mg, Intravenous, Q24H  sodium chloride, 10 mL, Intravenous, Q12H       Pharmacy Consult - Remdesivir,          Active Hospital Problems:  Active Hospital Problems    Diagnosis    • **COVID-19    • Acute exacerbation of chronic obstructive pulmonary disease (COPD) (HCC)    • Chronic pain of both shoulders    • Chronic pain of left knee    • Chronic midline low back pain without sciatica    • Pure hypercholesterolemia    • Tobacco use disorder    • Hypertension      Plan:     COVID-19  Acute hypoxic respiratory failure  -SPO2 81% on room air, currently 100% on continuous BiPAP  -Chest x-ray reviewed, showing bilateral infiltrates consistent with viral pneumonia  -COVID-19 + 3/4/2023  -Continue remdesivir 5-day course  -Continue dexamethasone 6 mg daily  -Empiric ceftriaxone and azithromycin  -Supportive care      Essential Hypertension  -Controlled  -Monitor BP  -Continue home amlodipine, losartan,  hydrochlorothiazide, metoprolol,      Hyperlipidemia  -Continue atorvastatin     Chronic pain  -Continue home [place], inspect verified     Tobacco abuse  -Encourage cessation    DVT prophylaxis:  Mechanical DVT prophylaxis orders are present.    CODE STATUS:    Code Status (Patient has no pulse and is not breathing): CPR (Attempt to Resuscitate)  Medical Interventions (Patient has pulse or is breathing): Full Support      Disposition:  I expect patient to be discharged in 5 days.        Electronically signed by Prosper Atkins MD, 03/05/23, 11:25 [facility].  [facility]             Electronically signed by Prosper Atkins MD at 03/05/23 1128

## 2023-03-06 NOTE — PLAN OF CARE
Goal Outcome Evaluation:  Plan of Care Reviewed With: patient        Progress: improving     Patient alert and oriented x4, vitals stable, on airvo 40L, 87%. Tolerating well, IV antibiotics continued, IV remdesivir, patient still has some wheezing.     Tylenol administered for headache.

## 2023-03-06 NOTE — CASE MANAGEMENT/SOCIAL WORK
Discharge Planning Assessment  [facility]     Patient Name: Chi Ramirez II  MRN: [id number]  Today's Date: 3/6/2023    Admit Date: 3/4/2023    Plan: DC PLAN: Routine home      Discharge Needs Assessment     Row Name 03/06/23 1525       Living Environment    People in Home spouse    Current Living Arrangements home    Primary Care Provided by self    Family Caregiver if Needed none    Able to Return to Prior Arrangements yes       Resource/Environmental Concerns    Resource/Environmental Concerns none       Food Insecurity    Within the past 12 months, you worried that your food would run out before you got the money to buy more. Never true    Within the past 12 months, the food you bought just didn't last and you didn't have money to get more. Never true       Transition Planning    Patient/Family Anticipates Transition to home    Patient/Family Anticipated Services at Transition     Transportation Anticipated car, drives self;family or friend will provide       Discharge Needs Assessment    Equipment Currently Used at Home none               Discharge Plan     Row Name 03/06/23 1526       Plan    Plan DC PLAN: Routine home    Patient/Family in Agreement with Plan yes    Plan Comments Met with patient at bedside, from routine home. Independent with ADL's, no DME. PCP is Dr. Tello, [facility]. Able to afford medications denies any transportation issues, still works full time. Denies any concerns about returning home.              Continued Care and Services - Admitted Since 3/4/2023    Coordination has not been started for this encounter.       Expected Discharge Date and Time     Expected Discharge Date Expected Discharge Time    Mar 9, 2023          Demographic Summary     Row Name 03/06/23 1522       General Information    Admission Type inpatient    Arrived From emergency department    Referral Source admission list    Reason for Consult discharge planning    Preferred  Language English       Contact Information    Permission Granted to Share Info With     Contact Information Obtained for                Functional Status     Row Name 03/06/23 1524       Functional Status    Usual Activity Tolerance moderate    Current Activity Tolerance moderate       Assessment of Health Literacy    How often do you have someone help you read hospital materials? Sometimes    How often do you have problems learning about your medical condition because of difficulty understanding written information? Sometimes    How often do you have a problem understanding what is told to you about your medical condition? Sometimes    How confident are you filling out medical forms by yourself? Somewhat    Health Literacy Moderate       Functional Status, IADL    Medications independent    Meal Preparation independent    Housekeeping independent    Laundry independent    Shopping independent       Mental Status    General Appearance WDL WDL       Mental Status Summary    Recent Changes in Mental Status/Cognitive Functioning no changes              Met with patient in room wearing PPE: mask, face shield/goggles, gloves, gown.      Maintained distance greater than six feet and spent less than 15 minutes in the room.      Saranya Ledbetter RN     Office phone: [phone]  Cell phone: [phone]

## 2023-03-06 NOTE — PROGRESS NOTES
[facility] Medicine Services Daily Progress Note    Patient Name: Chi Ramirez II  : 1960  MRN: [id number]  Primary Care Physician:  Darell Tello DO  Date of admission: 3/4/2023      Subjective      Chief Complaint: Shortness of breath/cough      Patient Reports continued cough, shortness of breath although chronic with patient's history of COPD.  Patient denies any fevers, chills or chest pain or palpitations. Reporting no headaches today, no chronic hx of HA.     ROS   Negative except as mentioned HPI    Objective      Vitals:   Temp:  [96.6 °F (35.9 °C)-98.2 °F (36.8 °C)] 98.1 °F (36.7 °C)  Heart Rate:  [] 73  Resp:  [16-25] 18  BP: (119-139)/(65-86) 135/76  Flow (L/min):  [15-40] 40    Physical Exam  HENT:      Head: Normocephalic.      Mouth/Throat:      Mouth: Mucous membranes are moist.   Eyes:      Extraocular Movements: Extraocular movements intact.      Pupils: Pupils are equal, round, and reactive to light.   Cardiovascular:      Rate and Rhythm: Normal rate and regular rhythm.   Pulmonary:      Effort: Pulmonary effort is normal.      Breath sounds: Wheezing and rales present.   Abdominal:      General: Bowel sounds are normal. There is no distension.      Palpations: Abdomen is soft.      Tenderness: There is no abdominal tenderness.   Neurological:      General: No focal deficit present.      Mental Status: He is alert and oriented to person, place, and time.            Result Review    Result Review:  I have personally reviewed the results from the time of this admission to 3/6/2023 13:16 EST and agree with these findings:  [x]  Laboratory  []  Microbiology  [x]  Radiology  []  EKG/Telemetry   []  Cardiology/Vascular   []  Pathology  []  Old records  []  Other:  Most notable findings include:           Assessment & Plan      Brief Patient Summary:  Chi Ramirez II is a 62 y.o. male who       amLODIPine, 2.5 mg, Oral, Daily  aspirin, 81 mg, Oral,  Daily  atorvastatin, 20 mg, Oral, Daily  cefTRIAXone, 2 g, Intravenous, Q24H  dexamethasone, 6 mg, Oral, Daily   Or  dexamethasone, 6 mg, Intravenous, Daily  docusate sodium, 100 mg, Oral, BID  doxycycline, 100 mg, Intravenous, Q12H  losartan, 50 mg, Oral, Q24H   And  hydroCHLOROthiazide, 12.5 mg, Oral, Q24H  nicotine, 1 patch, Transdermal, Q24H  polyethylene glycol, 17 g, Oral, Daily  remdesivir, 100 mg, Intravenous, Q24H  sodium chloride, 10 mL, Intravenous, Q12H       Pharmacy Consult - Remdesivir,          Active Hospital Problems:  Active Hospital Problems    Diagnosis    • **COVID-19    • Acute exacerbation of chronic obstructive pulmonary disease (COPD) (HCC)    • Chronic pain of both shoulders    • Chronic pain of left knee    • Chronic midline low back pain without sciatica    • Pure hypercholesterolemia    • Tobacco use disorder    • Hypertension      Plan:     COVID-19  Acute hypoxic respiratory failure  -Worsening oxygen requirements, currently requiring 90% FiO2 and high flow  -Chest x-ray reviewed, showing bilateral infiltrates consistent with viral pneumonia  -COVID-19 + 3/4/2023  -Continue remdesivir 5-day course  -Continue dexamethasone 6 mg daily  -Empiric ceftriaxone and azithromycin  -Supportive care   -As needed antitussives    Headaches  -Likely secondary to COVID  -As needed sumatriptan     Essential Hypertension  -Controlled  -Monitor BP  -Continue home amlodipine, losartan, hydrochlorothiazide, metoprolol,      Hyperlipidemia  -Continue atorvastatin     Chronic pain  -Continue home [place], inspect verified     Tobacco abuse  -Encourage cessation    DVT prophylaxis:  Mechanical DVT prophylaxis orders are present.    CODE STATUS:    Code Status (Patient has no pulse and is not breathing): CPR (Attempt to Resuscitate)  Medical Interventions (Patient has pulse or is breathing): Full Support      Disposition:  I expect patient to be discharged in 5 days.        Electronically signed by Prosper  MD Milly, 03/06/23, 13:16 EST.  [facility]

## 2023-03-07 LAB
ALBUMIN SERPL-MCNC: 3.9 G/DL (ref 3.5–5.2)
ALP SERPL-CCNC: 90 U/L (ref 39–117)
ALT SERPL W P-5'-P-CCNC: 21 U/L (ref 1–41)
ANION GAP SERPL CALCULATED.3IONS-SCNC: 4 MMOL/L (ref 5–15)
AST SERPL-CCNC: 18 U/L (ref 1–40)
BASOPHILS # BLD AUTO: 0 10*3/MM3 (ref 0–0.2)
BASOPHILS NFR BLD AUTO: 0.4 % (ref 0–1.5)
BILIRUB CONJ SERPL-MCNC: <0.2 MG/DL (ref 0–0.3)
BILIRUB INDIRECT SERPL-MCNC: NORMAL MG/DL
BILIRUB SERPL-MCNC: 0.2 MG/DL (ref 0–1.2)
BUN SERPL-MCNC: 22 MG/DL (ref 8–23)
BUN/CREAT SERPL: 31 (ref 7–25)
CALCIUM SPEC-SCNC: 8.8 MG/DL (ref 8.6–10.5)
CHLORIDE SERPL-SCNC: 96 MMOL/L (ref 98–107)
CO2 SERPL-SCNC: 39 MMOL/L (ref 22–29)
CREAT SERPL-MCNC: 0.71 MG/DL (ref 0.76–1.27)
DEPRECATED RDW RBC AUTO: 48.1 FL (ref 37–54)
EGFRCR SERPLBLD CKD-EPI 2021: 103.7 ML/MIN/1.73
EOSINOPHIL # BLD AUTO: 0 10*3/MM3 (ref 0–0.4)
EOSINOPHIL NFR BLD AUTO: 0.2 % (ref 0.3–6.2)
ERYTHROCYTE [DISTWIDTH] IN BLOOD BY AUTOMATED COUNT: 13.7 % (ref 12.3–15.4)
GLUCOSE SERPL-MCNC: 109 MG/DL (ref 65–99)
HCT VFR BLD AUTO: 49.6 % (ref 37.5–51)
HGB BLD-MCNC: 15.8 G/DL (ref 13–17.7)
INR PPP: 1 (ref 0.93–1.1)
LYMPHOCYTES # BLD AUTO: 1.9 10*3/MM3 (ref 0.7–3.1)
LYMPHOCYTES NFR BLD AUTO: 16.6 % (ref 19.6–45.3)
MAGNESIUM SERPL-MCNC: 1.6 MG/DL (ref 1.6–2.4)
MCH RBC QN AUTO: 31.8 PG (ref 26.6–33)
MCHC RBC AUTO-ENTMCNC: 31.8 G/DL (ref 31.5–35.7)
MCV RBC AUTO: 99.8 FL (ref 79–97)
MONOCYTES # BLD AUTO: 1.5 10*3/MM3 (ref 0.1–0.9)
MONOCYTES NFR BLD AUTO: 12.8 % (ref 5–12)
NEUTROPHILS NFR BLD AUTO: 70 % (ref 42.7–76)
NEUTROPHILS NFR BLD AUTO: 8.1 10*3/MM3 (ref 1.7–7)
NRBC BLD AUTO-RTO: 0.1 /100 WBC (ref 0–0.2)
PHOSPHATE SERPL-MCNC: 2.3 MG/DL (ref 2.5–4.5)
PLATELET # BLD AUTO: 138 10*3/MM3 (ref 140–450)
PMV BLD AUTO: 7.7 FL (ref 6–12)
POTASSIUM SERPL-SCNC: 4.5 MMOL/L (ref 3.5–5.2)
PROT SERPL-MCNC: 6.2 G/DL (ref 6–8.5)
PROTHROMBIN TIME: 10.3 SECONDS (ref 9.6–11.7)
RBC # BLD AUTO: 4.97 10*6/MM3 (ref 4.14–5.8)
SODIUM SERPL-SCNC: 139 MMOL/L (ref 136–145)
WBC NRBC COR # BLD: 11.6 10*3/MM3 (ref 3.4–10.8)

## 2023-03-07 PROCEDURE — 94761 N-INVAS EAR/PLS OXIMETRY MLT: CPT

## 2023-03-07 PROCEDURE — 85025 COMPLETE CBC W/AUTO DIFF WBC: CPT

## 2023-03-07 PROCEDURE — 94799 UNLISTED PULMONARY SVC/PX: CPT

## 2023-03-07 PROCEDURE — 25010000002 REMDESIVIR 100 MG/20ML SOLUTION 1 EACH VIAL

## 2023-03-07 PROCEDURE — 80076 HEPATIC FUNCTION PANEL: CPT

## 2023-03-07 PROCEDURE — 0 MAGNESIUM SULFATE 4 GM/100ML SOLUTION: Performed by: STUDENT IN AN ORGANIZED HEALTH CARE EDUCATION/TRAINING PROGRAM

## 2023-03-07 PROCEDURE — 85610 PROTHROMBIN TIME: CPT

## 2023-03-07 PROCEDURE — 36415 COLL VENOUS BLD VENIPUNCTURE: CPT

## 2023-03-07 PROCEDURE — 84100 ASSAY OF PHOSPHORUS: CPT

## 2023-03-07 PROCEDURE — 94660 CPAP INITIATION&MGMT: CPT

## 2023-03-07 PROCEDURE — 83735 ASSAY OF MAGNESIUM: CPT

## 2023-03-07 PROCEDURE — 80048 BASIC METABOLIC PNL TOTAL CA: CPT

## 2023-03-07 RX ADMIN — NICOTINE 1 PATCH: 14 PATCH, EXTENDED RELEASE TRANSDERMAL at 09:08

## 2023-03-07 RX ADMIN — DOCUSATE SODIUM 100 MG: 100 CAPSULE, LIQUID FILLED ORAL at 21:13

## 2023-03-07 RX ADMIN — ATORVASTATIN CALCIUM 20 MG: 20 TABLET, FILM COATED ORAL at 08:36

## 2023-03-07 RX ADMIN — Medication 10 ML: at 09:09

## 2023-03-07 RX ADMIN — MAGNESIUM SULFATE HEPTAHYDRATE 4 G: 40 INJECTION, SOLUTION INTRAVENOUS at 16:30

## 2023-03-07 RX ADMIN — DEXAMETHASONE 6 MG: 6 TABLET ORAL at 08:35

## 2023-03-07 RX ADMIN — LOSARTAN POTASSIUM 50 MG: 50 TABLET, FILM COATED ORAL at 08:36

## 2023-03-07 RX ADMIN — METOPROLOL SUCCINATE 50 MG: 50 TABLET, EXTENDED RELEASE ORAL at 08:35

## 2023-03-07 RX ADMIN — DOCUSATE SODIUM 100 MG: 100 CAPSULE, LIQUID FILLED ORAL at 08:36

## 2023-03-07 RX ADMIN — REMDESIVIR 100 MG: 100 INJECTION, POWDER, LYOPHILIZED, FOR SOLUTION INTRAVENOUS at 21:12

## 2023-03-07 RX ADMIN — Medication 10 ML: at 21:12

## 2023-03-07 RX ADMIN — HYDROCODONE BITARTRATE AND ACETAMINOPHEN 1 TABLET: 10; 325 TABLET ORAL at 21:13

## 2023-03-07 RX ADMIN — AMLODIPINE BESYLATE 2.5 MG: 5 TABLET ORAL at 08:35

## 2023-03-07 RX ADMIN — ASPIRIN 81 MG: 81 TABLET, COATED ORAL at 08:36

## 2023-03-07 RX ADMIN — DOXYCYCLINE 100 MG: 100 INJECTION, POWDER, LYOPHILIZED, FOR SOLUTION INTRAVENOUS at 09:09

## 2023-03-07 RX ADMIN — HYDROCHLOROTHIAZIDE 12.5 MG: 12.5 TABLET ORAL at 08:36

## 2023-03-07 RX ADMIN — HYDROCODONE BITARTRATE AND ACETAMINOPHEN 1 TABLET: 10; 325 TABLET ORAL at 08:35

## 2023-03-07 RX ADMIN — POLYETHYLENE GLYCOL 3350 17 G: 17 POWDER, FOR SOLUTION ORAL at 09:09

## 2023-03-07 NOTE — PROGRESS NOTES
[facility] Medicine Services Daily Progress Note    Patient Name: Chi Ramirez II  : 1960  MRN: [id number]  Primary Care Physician:  Darell Tello DO  Date of admission: 3/4/2023      Subjective      Chief Complaint: Shortness of breath/cough      Patient Reports continued cough, shortness of breath although chronic with patient's history of COPD.  Patient denies any fevers, chills or chest pain or palpitations. Reporting no headaches today, no chronic hx of HA.     ROS   Negative except as mentioned HPI    Objective      Vitals:   Temp:  [97.4 °F (36.3 °C)-98.7 °F (37.1 °C)] 98.2 °F (36.8 °C)  Heart Rate:  [59-86] 69  Resp:  [13-20] 20  BP: (111-146)/(69-90) 111/69  Flow (L/min):  [25-40] 25    Physical Exam  HENT:      Head: Normocephalic.      Mouth/Throat:      Mouth: Mucous membranes are moist.   Eyes:      Extraocular Movements: Extraocular movements intact.      Pupils: Pupils are equal, round, and reactive to light.   Cardiovascular:      Rate and Rhythm: Normal rate and regular rhythm.   Pulmonary:      Effort: Pulmonary effort is normal.      Breath sounds: Wheezing and rales present.   Abdominal:      General: Bowel sounds are normal. There is no distension.      Palpations: Abdomen is soft.      Tenderness: There is no abdominal tenderness.   Neurological:      General: No focal deficit present.      Mental Status: He is alert and oriented to person, place, and time.            Result Review    Result Review:  I have personally reviewed the results from the time of this admission to 3/7/2023 15:13 EST and agree with these findings:  [x]  Laboratory  []  Microbiology  [x]  Radiology  []  EKG/Telemetry   []  Cardiology/Vascular   []  Pathology  []  Old records  []  Other:  Most notable findings include:           Assessment & Plan      Brief Patient Summary:  Chi Ramirez II is a 62 y.o. male who       amLODIPine, 2.5 mg, Oral, Daily  aspirin, 81 mg, Oral,  Daily  atorvastatin, 20 mg, Oral, Daily  dexamethasone, 6 mg, Oral, Daily  docusate sodium, 100 mg, Oral, BID  losartan, 50 mg, Oral, Q24H   And  hydroCHLOROthiazide, 12.5 mg, Oral, Q24H  metoprolol succinate XL, 50 mg, Oral, Daily  nicotine, 1 patch, Transdermal, Q24H  polyethylene glycol, 17 g, Oral, Daily  remdesivir, 100 mg, Intravenous, Q24H  sodium chloride, 10 mL, Intravenous, Q12H       Pharmacy Consult - Remdesivir,          Active Hospital Problems:  Active Hospital Problems    Diagnosis    • **COVID-19    • Acute exacerbation of chronic obstructive pulmonary disease (COPD) (HCC)    • Chronic pain of both shoulders    • Chronic pain of left knee    • Chronic midline low back pain without sciatica    • Pure hypercholesterolemia    • Tobacco use disorder    • Hypertension      Plan:     COVID-19  Acute hypoxic respiratory failure  -Worsening oxygen requirements, currently requiring 90% FiO2 and high flow  -Chest x-ray reviewed, showing bilateral infiltrates consistent with viral pneumonia  -COVID-19 + 3/4/2023  -Continue remdesivir 5-day course  -Continue dexamethasone 6 mg daily  -Empiric ceftriaxone and azithromycin  -Supportive care   -As needed antitussives    Headaches  -Likely secondary to COVID  -As needed sumatriptan     Essential Hypertension  -Controlled  -Monitor BP  -Continue home amlodipine, losartan, hydrochlorothiazide, metoprolol,      Hyperlipidemia  -Continue atorvastatin     Chronic pain  -Continue home [place], inspect verified     Tobacco abuse  -Encourage cessation    DVT prophylaxis:  Mechanical DVT prophylaxis orders are present.    CODE STATUS:    Code Status (Patient has no pulse and is not breathing): CPR (Attempt to Resuscitate)  Medical Interventions (Patient has pulse or is breathing): Full Support      Disposition:  I expect patient to be discharged in 5 days.        Electronically signed by Prosper Atkins MD, 03/07/23, 15:13 EST.  [facility] Hospitalist Team

## 2023-03-08 LAB
ALBUMIN SERPL-MCNC: 3.8 G/DL (ref 3.5–5.2)
ALP SERPL-CCNC: 79 U/L (ref 39–117)
ALT SERPL W P-5'-P-CCNC: 28 U/L (ref 1–41)
ANION GAP SERPL CALCULATED.3IONS-SCNC: 6 MMOL/L (ref 5–15)
AST SERPL-CCNC: 22 U/L (ref 1–40)
BASOPHILS # BLD AUTO: 0.1 10*3/MM3 (ref 0–0.2)
BASOPHILS NFR BLD AUTO: 0.7 % (ref 0–1.5)
BILIRUB CONJ SERPL-MCNC: <0.2 MG/DL (ref 0–0.3)
BILIRUB INDIRECT SERPL-MCNC: NORMAL MG/DL
BILIRUB SERPL-MCNC: 0.4 MG/DL (ref 0–1.2)
BUN SERPL-MCNC: 20 MG/DL (ref 8–23)
BUN/CREAT SERPL: 32.8 (ref 7–25)
CALCIUM SPEC-SCNC: 8.6 MG/DL (ref 8.6–10.5)
CHLORIDE SERPL-SCNC: 94 MMOL/L (ref 98–107)
CO2 SERPL-SCNC: 37 MMOL/L (ref 22–29)
CREAT SERPL-MCNC: 0.61 MG/DL (ref 0.76–1.27)
DEPRECATED RDW RBC AUTO: 46.8 FL (ref 37–54)
EGFRCR SERPLBLD CKD-EPI 2021: 108.6 ML/MIN/1.73
EOSINOPHIL # BLD AUTO: 0 10*3/MM3 (ref 0–0.4)
EOSINOPHIL NFR BLD AUTO: 0.3 % (ref 0.3–6.2)
ERYTHROCYTE [DISTWIDTH] IN BLOOD BY AUTOMATED COUNT: 13.5 % (ref 12.3–15.4)
GLUCOSE SERPL-MCNC: 115 MG/DL (ref 65–99)
HCT VFR BLD AUTO: 52.2 % (ref 37.5–51)
HGB BLD-MCNC: 17 G/DL (ref 13–17.7)
LYMPHOCYTES # BLD AUTO: 2.4 10*3/MM3 (ref 0.7–3.1)
LYMPHOCYTES NFR BLD AUTO: 22.6 % (ref 19.6–45.3)
MCH RBC QN AUTO: 32.4 PG (ref 26.6–33)
MCHC RBC AUTO-ENTMCNC: 32.5 G/DL (ref 31.5–35.7)
MCV RBC AUTO: 99.7 FL (ref 79–97)
MONOCYTES # BLD AUTO: 1.1 10*3/MM3 (ref 0.1–0.9)
MONOCYTES NFR BLD AUTO: 10.4 % (ref 5–12)
NEUTROPHILS NFR BLD AUTO: 6.9 10*3/MM3 (ref 1.7–7)
NEUTROPHILS NFR BLD AUTO: 66 % (ref 42.7–76)
NRBC BLD AUTO-RTO: 0 /100 WBC (ref 0–0.2)
PLATELET # BLD AUTO: 135 10*3/MM3 (ref 140–450)
PMV BLD AUTO: 7.8 FL (ref 6–12)
POTASSIUM SERPL-SCNC: 4.2 MMOL/L (ref 3.5–5.2)
PROT SERPL-MCNC: 6.3 G/DL (ref 6–8.5)
RBC # BLD AUTO: 5.24 10*6/MM3 (ref 4.14–5.8)
SODIUM SERPL-SCNC: 137 MMOL/L (ref 136–145)
WBC NRBC COR # BLD: 10.5 10*3/MM3 (ref 3.4–10.8)

## 2023-03-08 PROCEDURE — 80076 HEPATIC FUNCTION PANEL: CPT

## 2023-03-08 PROCEDURE — 85025 COMPLETE CBC W/AUTO DIFF WBC: CPT | Performed by: STUDENT IN AN ORGANIZED HEALTH CARE EDUCATION/TRAINING PROGRAM

## 2023-03-08 PROCEDURE — 94660 CPAP INITIATION&MGMT: CPT

## 2023-03-08 PROCEDURE — 94761 N-INVAS EAR/PLS OXIMETRY MLT: CPT

## 2023-03-08 PROCEDURE — 94799 UNLISTED PULMONARY SVC/PX: CPT

## 2023-03-08 PROCEDURE — 94664 DEMO&/EVAL PT USE INHALER: CPT

## 2023-03-08 PROCEDURE — 80048 BASIC METABOLIC PNL TOTAL CA: CPT | Performed by: STUDENT IN AN ORGANIZED HEALTH CARE EDUCATION/TRAINING PROGRAM

## 2023-03-08 PROCEDURE — 36415 COLL VENOUS BLD VENIPUNCTURE: CPT | Performed by: STUDENT IN AN ORGANIZED HEALTH CARE EDUCATION/TRAINING PROGRAM

## 2023-03-08 RX ORDER — ALBUTEROL SULFATE 90 UG/1
2 AEROSOL, METERED RESPIRATORY (INHALATION)
Status: DISCONTINUED | OUTPATIENT
Start: 2023-03-08 | End: 2023-03-09 | Stop reason: HOSPADM

## 2023-03-08 RX ORDER — IPRATROPIUM BROMIDE AND ALBUTEROL SULFATE 2.5; .5 MG/3ML; MG/3ML
3 SOLUTION RESPIRATORY (INHALATION)
Status: DISCONTINUED | OUTPATIENT
Start: 2023-03-08 | End: 2023-03-08

## 2023-03-08 RX ADMIN — BENZONATATE 200 MG: 100 CAPSULE ORAL at 04:24

## 2023-03-08 RX ADMIN — Medication 10 ML: at 08:53

## 2023-03-08 RX ADMIN — DEXAMETHASONE 6 MG: 6 TABLET ORAL at 08:50

## 2023-03-08 RX ADMIN — DOCUSATE SODIUM 100 MG: 100 CAPSULE, LIQUID FILLED ORAL at 08:50

## 2023-03-08 RX ADMIN — DOCUSATE SODIUM 100 MG: 100 CAPSULE, LIQUID FILLED ORAL at 20:58

## 2023-03-08 RX ADMIN — ACETAMINOPHEN 650 MG: 325 TABLET, FILM COATED ORAL at 04:12

## 2023-03-08 RX ADMIN — METOPROLOL SUCCINATE 50 MG: 50 TABLET, EXTENDED RELEASE ORAL at 08:50

## 2023-03-08 RX ADMIN — HYDROCODONE BITARTRATE AND ACETAMINOPHEN 1 TABLET: 10; 325 TABLET ORAL at 08:50

## 2023-03-08 RX ADMIN — NICOTINE 1 PATCH: 14 PATCH, EXTENDED RELEASE TRANSDERMAL at 08:52

## 2023-03-08 RX ADMIN — HYDROCHLOROTHIAZIDE 12.5 MG: 12.5 TABLET ORAL at 08:50

## 2023-03-08 RX ADMIN — ALBUTEROL SULFATE 2 PUFF: 108 INHALANT RESPIRATORY (INHALATION) at 15:45

## 2023-03-08 RX ADMIN — ASPIRIN 81 MG: 81 TABLET, COATED ORAL at 08:50

## 2023-03-08 RX ADMIN — AMLODIPINE BESYLATE 2.5 MG: 5 TABLET ORAL at 08:49

## 2023-03-08 RX ADMIN — Medication 5 MG: at 20:58

## 2023-03-08 RX ADMIN — SUMATRIPTAN SUCCINATE 25 MG: 25 TABLET ORAL at 04:12

## 2023-03-08 RX ADMIN — LOSARTAN POTASSIUM 50 MG: 50 TABLET, FILM COATED ORAL at 08:50

## 2023-03-08 RX ADMIN — ATORVASTATIN CALCIUM 20 MG: 20 TABLET, FILM COATED ORAL at 08:50

## 2023-03-08 RX ADMIN — POLYETHYLENE GLYCOL 3350 17 G: 17 POWDER, FOR SOLUTION ORAL at 08:50

## 2023-03-08 NOTE — PAYOR COMM NOTE
"Clinical update for case# [id number]    Continued high flow supplemental oxygen and IV Remdesivir. Updated MD notes and clinical attached.  ========  EXTENDED AUTHORIZATION PENDING:   PLEASE FAX OR CALL DETERMINATION TO CONTACT BELOW:       THANK YOU,    ANGLE AlvarezN, RN  Utilization Review  [facility]  Phone: [phone]  Fax: [phone]      NPI: [id number]  Tax ID: [id number]    Chi Jerry II \"Luis\" (62 y.o. Male)     Date of Birth   1960    Social Security Number       Address   [address] [place] [address]    Home Phone   [phone]    MRN   [id number]       [place]   [facility]    Marital Status                               Admission Date   3/4/23    Admission Type   Emergency    Admitting Provider   Ina Barney MD    Attending Provider   Prosper Atkins MD    Department, Room/Bed   [facility] PROGRESS CARE, 2127/1       Discharge Date       Discharge Disposition       Discharge Destination                               Attending Provider: Prosper Atkins MD    Allergies: No Known Allergies    Isolation: Enh Drop/Con   Infection: COVID (confirmed) (03/04/23)   Code Status: CPR    Ht: 180.3 cm (71\")   Wt: 136 kg (299 lb 13.2 oz)    Admission Cmt: None   Principal Problem: COVID-19 [U07.1]                 Active Insurance as of 3/4/2023     Primary Coverage     Payor Plan Insurance Group Employer/Plan Group    R [id number]     Payor Plan Address Payor Plan Phone Number Payor Plan Fax Number Effective Dates    [address] [address] [phone]  1/1/2018 - None Entered    [facility] [id number]       Subscriber Name Subscriber Birth Date Member ID       CHI JERRY II 1960 [id number]                 Emergency Contacts      (Rel.) Home Phone Work Phone Mobile Phone    TIANA VALERIO (Spouse) [phone] -- --            Vital Signs (last day)     Date/Time Temp Temp src Pulse Resp BP Patient Position SpO2    03/08/23 0900 " 97.3 (36.3) Oral 63 21 -- Sitting 93    03/08/23 0600 97 (36.1) Axillary 57 22 118/66 Lying 96    03/08/23 0325 -- -- 62 -- -- -- 96    03/08/23 0154 97.2 (36.2) Oral 68 17 156/84 Lying 98    03/08/23 0121 -- -- 70 14 -- -- 96    03/07/23 2310 -- -- 62 20 -- -- 93    03/07/23 2157 97.8 (36.6) Oral 66 24 140/82 Lying 96    03/07/23 2002 -- -- 68 20 -- -- 94    03/07/23 1718 98.3 (36.8) Oral 65 22 137/78 Sitting 91    03/07/23 1540 -- -- 63 18 -- -- 91    03/07/23 1401 98.2 (36.8) Oral 69 20 111/69 Sitting 91    03/07/23 1332 -- -- 69 -- -- -- 94    03/07/23 1117 -- -- 66 -- -- -- 93    03/07/23 1002 98.4 (36.9) Oral 65 15 123/82 Sitting 92    03/07/23 0645 -- -- 74 -- -- -- 96    03/07/23 0509 98.7 (37.1) Oral 73 17 146/90 Sitting 94    03/07/23 0359 -- -- 71 -- -- -- 95    03/07/23 0341 -- -- 73 -- -- -- 98    03/07/23 0201 97.5 (36.4) Oral 61 15 134/75 Lying 99          Oxygen Therapy (last day)     Date/Time SpO2 Device (Oxygen Therapy) Flow (L/min) Oxygen Concentration (%) ETCO2 (mmHg)    03/08/23 0900 93 humidified;high-flow nasal cannula 4 -- --    03/08/23 0800 -- nasal cannula 4 -- --    03/08/23 0600 96 humidified;high-flow nasal cannula 6 -- --    03/08/23 0435 -- humidified;high-flow nasal cannula 6 -- --    03/08/23 0325 96 humidified;high-flow nasal cannula 8 -- --    03/08/23 0154 98 humidified;high-flow nasal cannula 8 -- --    03/08/23 0121 96 humidified;high-flow nasal cannula 6 -- --    03/08/23 0020 -- room air -- -- --    03/07/23 2310 93 humidified;heated;high-flow nasal cannula 15 35 --    03/07/23 2157 96 high-flow nasal cannula;humidified;heated 25 39 --    03/07/23 2059 -- heated;humidified;high-flow nasal cannula 25 39 --    03/07/23 2002 94 heated;humidified;high-flow nasal cannula 25 40 --    03/07/23 1718 91 heated;humidified;high-flow nasal cannula 25 45 --    03/07/23 1600 -- heated;high-flow nasal cannula;humidified 25 45 --    03/07/23 1540 91 heated;high-flow nasal cannula;humidified  25 45 --    03/07/23 1401 91 humidified;heated;high-flow nasal cannula 25 45 --    03/07/23 1332 94 humidified;heated;high-flow nasal cannula 25 45 --    03/07/23 1200 -- -- 25 45 --    03/07/23 1117 93 humidified;heated;high-flow nasal cannula 30 50 --    03/07/23 1002 92 humidified;heated;high-flow nasal cannula 35 49 --    03/07/23 0800 -- high-flow nasal cannula;humidified;heated 35 49 --    03/07/23 0645 96 humidified;heated;high-flow nasal cannula 35 49 --    03/07/23 0509 94 heated;high-flow nasal cannula;humidified 40 53 --    03/07/23 0359 95 heated;high-flow nasal cannula;humidified 40 53 --    03/07/23 0341 98 heated;high-flow nasal cannula;humidified 40 53 --    03/07/23 0201 99 heated;high-flow nasal cannula;humidified 40 64 --          Current Facility-Administered Medications   Medication Dose Route Frequency Provider Last Rate Last Admin   • acetaminophen (TYLENOL) tablet 650 mg  650 mg Oral Q4H PRN Seema Brown APRN   650 mg at 03/08/23 0412    Or   • acetaminophen (TYLENOL) 160 MG/5ML solution 650 mg  650 mg Oral Q4H PRN Seema Brown APRN        Or   • acetaminophen (TYLENOL) suppository 650 mg  650 mg Rectal Q4H PRN Seema Brown APRN       • amLODIPine (NORVASC) tablet 2.5 mg  2.5 mg Oral Daily Prosper Atkins MD   2.5 mg at 03/08/23 0849   • aspirin EC tablet 81 mg  81 mg Oral Daily Prosper Atkins MD   81 mg at 03/08/23 0850   • atorvastatin (LIPITOR) tablet 20 mg  20 mg Oral Daily Prosper Atkins MD   20 mg at 03/08/23 0850   • benzonatate (TESSALON) capsule 200 mg  200 mg Oral TID PRN Prosper Atkins MD   200 mg at 03/08/23 0424   • dexamethasone (DECADRON) tablet 6 mg  6 mg Oral Daily Seema Brown APRN   6 mg at 03/08/23 0850   • docusate sodium (COLACE) capsule 100 mg  100 mg Oral BID Prosper Atkins MD   100 mg at 03/08/23 0850   • guaifenesin (ROBITUSSIN) 100 MG/5ML liquid 200 mg  200 mg Oral Q4H PRN Prosper Atkins MD   200 mg at 03/06/23 0859   • losartan  (COZAAR) tablet 50 mg  50 mg Oral Q24H Prosper Atkins MD   50 mg at 03/08/23 0850    And   • hydroCHLOROthiazide (HYDRODIURIL) tablet 12.5 mg  12.5 mg Oral Q24H Prosper Atkins MD   12.5 mg at 03/08/23 0850   • HYDROcodone-acetaminophen (NORCO)  MG per tablet 1 tablet  1 tablet Oral Q6H PRN Prosper Atkins MD   1 tablet at 03/08/23 0850   • ipratropium-albuterol (DUO-NEB) nebulizer solution 3 mL  3 mL Nebulization Q6H PRN Seema Brown APRN       • ipratropium-albuterol (DUO-NEB) nebulizer solution 3 mL  3 mL Nebulization 4x Daily - RT Prosper Atkins MD       • Magnesium Sulfate - Total Dose 10 grams - Magnesium 1 or Less  2 g Intravenous PRN Prosper Atkins MD        Or   • Magnesium Sulfate - Total Dose 6 grams - Magnesium 1.1 - 1.5  2 g Intravenous PRN Prosper Atkins MD        Or   • Magnesium Sulfate - Total Dose 4 grams - Magnesium 1.6 - 1.9  4 g Intravenous PRN Prosper Atkins MD 25 mL/hr at 03/07/23 1630 4 g at 03/07/23 1630   • melatonin tablet 5 mg  5 mg Oral Nightly PRN Seema Brown, PIIL       • metoprolol succinate XL (TOPROL-XL) 24 hr tablet 50 mg  50 mg Oral Daily Prosper Atkins MD   50 mg at 03/08/23 0850   • nicotine (NICODERM CQ) 14 MG/24HR patch 1 patch  1 patch Transdermal Q24H Prosper Atkins MD   1 patch at 03/08/23 0852   • nitroglycerin (NITROSTAT) SL tablet 0.4 mg  0.4 mg Sublingual Q5 Min PRN Seema Brown APRBEL       • ondansetron (ZOFRAN) tablet 4 mg  4 mg Oral Q6H PRN Seema Brown APRBEL        Or   • ondansetron (ZOFRAN) injection 4 mg  4 mg Intravenous Q6H PRN Seema Brown APRBEL       • Pharmacy Consult - Remdesivir   Does not apply Continuous PRN Seema Brown, PILI       • polyethylene glycol (MIRALAX) packet 17 g  17 g Oral Daily Prosper Atkins MD   17 g at 03/08/23 0850   • potassium chloride (K-DUR,KLOR-CON) CR tablet 40 mEq  40 mEq Oral PRN Prosper Atkins MD       • potassium chloride (KLOR-CON) packet 40 mEq  40 mEq Oral PRN Milly  MD Prosper       • remdesivir 100 mg in sodium chloride 0.9 % 250 mL IVPB (powder vial)  100 mg Intravenous Q24H Seema Brown APRN   100 mg at 23 2112   • sodium chloride 0.9 % flush 10 mL  10 mL Intravenous Q12H Seema Brown APRN   10 mL at 23 0853   • sodium chloride 0.9 % flush 10 mL  10 mL Intravenous PRN Seema Brown APRN       • sodium chloride 0.9 % infusion 40 mL  40 mL Intravenous PRN Seema Brown APRN       • SUMAtriptan (IMITREX) tablet 25 mg  25 mg Oral Q8H PRN Prosper Atkins MD   25 mg at 23 0412        Physician Progress Notes (last 24 hours)      Prosper Atkins MD at 23 1222              [facility] Medicine Services Daily Progress Note    Patient Name: Chi Ramirez II  : 1960  MRN: [id number]  Primary Care Physician:  Darell Tello DO  Date of admission: 3/4/2023      Subjective       Chief Complaint: Shortness of breath/cough      Patient Reports continued cough, although improving, shortness of breath, improved, although chronic with patient's history of COPD.  Patient denies any fevers, chills or chest pain or palpitations. Reporting no headaches today, no chronic hx of HA.     ROS   Negative except as mentioned HPI    Objective       Vitals:   Temp:  [97 °F (36.1 °C)-98.3 °F (36.8 °C)] 97.3 °F (36.3 °C)  Heart Rate:  [57-70] 63  Resp:  [14-24] 21  BP: (111-156)/(66-84) 118/66  Flow (L/min):  [4-25] 4    Physical Exam  HENT:      Head: Normocephalic.      Mouth/Throat:      Mouth: Mucous membranes are moist.   Eyes:      Extraocular Movements: Extraocular movements intact.      Pupils: Pupils are equal, round, and reactive to light.   Cardiovascular:      Rate and Rhythm: Normal rate and regular rhythm.   Pulmonary:      Effort: Pulmonary effort is normal.      Breath sounds: Wheezing and rales present.   Abdominal:      General: Bowel sounds are normal. There is no distension.      Palpations: Abdomen is soft.       Tenderness: There is no abdominal tenderness.   Neurological:      General: No focal deficit present.      Mental Status: He is alert and oriented to person, place, and time.            Result Review    Result Review:  I have personally reviewed the results from the time of this admission to 3/8/2023 12:22 EST and agree with these findings:  [x]  Laboratory  []  Microbiology  [x]  Radiology  []  EKG/Telemetry   []  Cardiology/Vascular   []  Pathology  []  Old records  []  Other:  Most notable findings include:           Assessment & Plan      Brief Patient Summary:  Chi Ramriez II is a 62 y.o. male who       amLODIPine, 2.5 mg, Oral, Daily  aspirin, 81 mg, Oral, Daily  atorvastatin, 20 mg, Oral, Daily  dexamethasone, 6 mg, Oral, Daily  docusate sodium, 100 mg, Oral, BID  losartan, 50 mg, Oral, Q24H   And  hydroCHLOROthiazide, 12.5 mg, Oral, Q24H  ipratropium-albuterol, 3 mL, Nebulization, 4x Daily - RT  metoprolol succinate XL, 50 mg, Oral, Daily  nicotine, 1 patch, Transdermal, Q24H  polyethylene glycol, 17 g, Oral, Daily  remdesivir, 100 mg, Intravenous, Q24H  sodium chloride, 10 mL, Intravenous, Q12H       Pharmacy Consult - Remdesivir,          Active Hospital Problems:  Active Hospital Problems    Diagnosis    • **COVID-19    • Acute exacerbation of chronic obstructive pulmonary disease (COPD) (HCC)    • Chronic pain of both shoulders    • Chronic pain of left knee    • Chronic midline low back pain without sciatica    • Pure hypercholesterolemia    • Tobacco use disorder    • Hypertension      Plan:     COVID-19  Acute hypoxic respiratory failure  -Decreasing oxygen requirements, currently requiring 5LPM   -Chest x-ray reviewed, showing bilateral infiltrates consistent with viral pneumonia  -COVID-19 + 3/4/2023  -Continue remdesivir 5-day course  -Continue dexamethasone 6 mg daily  -Empiric ceftriaxone and azithromycin  -Supportive care   -As needed antitussives    Headaches  -Likely secondary to  COVID  -As needed sumatriptan     Essential Hypertension  -Controlled  -Monitor BP  -Continue home amlodipine, losartan, hydrochlorothiazide, metoprolol,      Hyperlipidemia  -Continue atorvastatin     Chronic pain  -Continue home [place], inspect verified     Tobacco abuse  -Encourage cessation    DVT prophylaxis:  Mechanical DVT prophylaxis orders are present.    CODE STATUS:    Code Status (Patient has no pulse and is not breathing): CPR (Attempt to Resuscitate)  Medical Interventions (Patient has pulse or is breathing): Full Support      Disposition:  I expect patient to be discharged in 1-2 days.        Electronically signed by Prosper Atkins MD, 23, 12:22 EST.  [facility] [place]             Electronically signed by Prosper Atkins MD at 23 1224     Prosper Atkins MD at 23 1513              [facility] Medicine Services Daily Progress Note    Patient Name: Chi Ramirez II  : 1960  MRN: [id number]  Primary Care Physician:  Darell Tello DO  Date of admission: 3/4/2023      Subjective       Chief Complaint: Shortness of breath/cough      Patient Reports continued cough, shortness of breath although chronic with patient's history of COPD.  Patient denies any fevers, chills or chest pain or palpitations. Reporting no headaches today, no chronic hx of HA.     ROS   Negative except as mentioned HPI    Objective       Vitals:   Temp:  [97.4 °F (36.3 °C)-98.7 °F (37.1 °C)] 98.2 °F (36.8 °C)  Heart Rate:  [59-86] 69  Resp:  [13-20] 20  BP: (111-146)/(69-90) 111/69  Flow (L/min):  [25-40] 25    Physical Exam  HENT:      Head: Normocephalic.      Mouth/Throat:      Mouth: Mucous membranes are moist.   Eyes:      Extraocular Movements: Extraocular movements intact.      Pupils: Pupils are equal, round, and reactive to light.   Cardiovascular:      Rate and Rhythm: Normal rate and regular rhythm.   Pulmonary:      Effort: Pulmonary effort is normal.       Breath sounds: Wheezing and rales present.   Abdominal:      General: Bowel sounds are normal. There is no distension.      Palpations: Abdomen is soft.      Tenderness: There is no abdominal tenderness.   Neurological:      General: No focal deficit present.      Mental Status: He is alert and oriented to person, place, and time.            Result Review    Result Review:  I have personally reviewed the results from the time of this admission to 3/7/2023 15:13 EST and agree with these findings:  [x]  Laboratory  []  Microbiology  [x]  Radiology  []  EKG/Telemetry   []  Cardiology/Vascular   []  Pathology  []  Old records  []  Other:  Most notable findings include:           Assessment & Plan      Brief Patient Summary:  Chi Ramirez II is a 62 y.o. male who       amLODIPine, 2.5 mg, Oral, Daily  aspirin, 81 mg, Oral, Daily  atorvastatin, 20 mg, Oral, Daily  dexamethasone, 6 mg, Oral, Daily  docusate sodium, 100 mg, Oral, BID  losartan, 50 mg, Oral, Q24H   And  hydroCHLOROthiazide, 12.5 mg, Oral, Q24H  metoprolol succinate XL, 50 mg, Oral, Daily  nicotine, 1 patch, Transdermal, Q24H  polyethylene glycol, 17 g, Oral, Daily  remdesivir, 100 mg, Intravenous, Q24H  sodium chloride, 10 mL, Intravenous, Q12H       Pharmacy Consult - Remdesivir,          Active Hospital Problems:  Active Hospital Problems    Diagnosis    • **COVID-19    • Acute exacerbation of chronic obstructive pulmonary disease (COPD) (HCC)    • Chronic pain of both shoulders    • Chronic pain of left knee    • Chronic midline low back pain without sciatica    • Pure hypercholesterolemia    • Tobacco use disorder    • Hypertension      Plan:     COVID-19  Acute hypoxic respiratory failure  -Worsening oxygen requirements, currently requiring 90% FiO2 and high flow  -Chest x-ray reviewed, showing bilateral infiltrates consistent with viral pneumonia  -COVID-19 + 3/4/2023  -Continue remdesivir 5-day course  -Continue dexamethasone 6 mg daily  -Empiric  ceftriaxone and azithromycin  -Supportive care   -As needed antitussives    Headaches  -Likely secondary to COVID  -As needed sumatriptan     Essential Hypertension  -Controlled  -Monitor BP  -Continue home amlodipine, losartan, hydrochlorothiazide, metoprolol,      Hyperlipidemia  -Continue atorvastatin     Chronic pain  -Continue home [place], inspect verified     Tobacco abuse  -Encourage cessation    DVT prophylaxis:  Mechanical DVT prophylaxis orders are present.    CODE STATUS:    Code Status (Patient has no pulse and is not breathing): CPR (Attempt to Resuscitate)  Medical Interventions (Patient has pulse or is breathing): Full Support      Disposition:  I expect patient to be discharged in 5 days.        Electronically signed by Prosper Atkins MD, 03/07/23, 15:13 [facility].  [facility] Hospitalist Team             Electronically signed by Prosper Atkins MD at 03/07/23 3768

## 2023-03-08 NOTE — CASE MANAGEMENT/SOCIAL WORK
Continued Stay Note  KONSTANTIN [place]     Patient Name: Chi Ramirez II  MRN: [id number]  Today's Date: 3/8/2023    Admit Date: 3/4/2023    Plan: COVID: Anticipate routine home, watch O2 needs.   Discharge Plan     Row Name 03/08/23 0956       Plan    Plan COVID: Anticipate routine home, watch O2 needs.    Plan Comments DC Barriers: COVID positive, IV Remdesivir, O2 requirements (currently on 6L HF). May require walking oximetry to be performed 24-48 hrs prior to d/c to determine any home O2 needs.              Phone communication or documentation only - no physical contact with patient or family.    Mitzy Mishra RN     Office Phone: [phone]  Office Cell: [phone]

## 2023-03-08 NOTE — CONSULTS
Nutrition Services    Patient Name: Chi Ramirez II  YOB: 1960  MRN: [id number]  Admission date: 3/4/2023    PPE Documentation        PPE Worn By Provider Respirator eye protection gloves gown   PPE Worn By Patient  None      NUTRITION SCREENING      Encounter Information:  Nutrition assessment r/t MST score 2, indicating pt was unsure if they had lost wt. Visited pt in room, pt states his appetite has been great lately. Pt reports UBW of around 300lb, consistent w/ current body wt.        PO Diet: Diet: Cardiac Diets, Diabetic Diets; Healthy Heart (2-3 Na+); Consistent Carbohydrate; Texture: Regular Texture (IDDSI 7); Fluid Consistency: Thin (IDDSI 0)   PO Supplements: None    PO Intake:  %       Labs (reviewed below): Reviewed        GI Function:  Stool Output  Stool Unmeasured Occurrence: 1 (03/08/23 1100)  Bowel Incontinence: No (03/08/23 1100)  Stool Amount: large (03/08/23 1200)          Skin: Intact        Weight Hx Review: Wt Readings from Last 10 Encounters:   03/08/23 0600 136 kg (299 lb 13.2 oz)   03/07/23 0509 136 kg (299 lb 6.2 oz)   03/04/23 1917 135 kg (297 lb 6.4 oz)   12/20/22 0832 136 kg (300 lb)   11/02/22 1135 135 kg (297 lb)   10/25/22 0804 (!) 143 kg (315 lb)   10/23/22 1453 (!) 143 kg (315 lb)   09/13/22 0859 (!) 137 kg (303 lb)   09/06/22 0808 (!) 137 kg (303 lb)   06/03/22 0818 (!) 143 kg (315 lb)   03/01/22 1459 (!) 143 kg (315 lb)   02/07/22 1513 (!) 141 kg (310 lb)   01/17/22 0852 (!) 142 kg (313 lb)            Nutrition Intervention: No nutrition intervention indicated at this time     Results from last 7 days   Lab Units 03/08/23  0714 03/07/23  0710 03/06/23  0106   SODIUM mmol/L 137 139 138   POTASSIUM mmol/L 4.2 4.5 4.7   CHLORIDE mmol/L 94* 96* 97*   CO2 mmol/L 37.0* 39.0* 33.0*   BUN mg/dL 20 22 25*   CREATININE mg/dL 0.61* 0.71* 0.93   CALCIUM mg/dL 8.6 8.8 8.7   BILIRUBIN mg/dL 0.4 0.2 0.2   ALK PHOS U/L 79 90 88   ALT (SGPT) U/L 28 21 20   AST (SGOT) U/L  22 18 21   GLUCOSE mg/dL 115* 109* 109*     Results from last 7 days   Lab Units 03/08/23  0713 03/07/23  0710 03/06/23  0106 03/04/23  2300   MAGNESIUM mg/dL  --  1.6 1.9 1.5*   PHOSPHORUS mg/dL  --  2.3* 3.8 2.9   HEMOGLOBIN g/dL 17.0 15.8 17.2 17.6   HEMATOCRIT % 52.2* 49.6 55.1* 54.7*     COVID19   Date Value Ref Range Status   03/04/2023 Detected (C) Not Detected - Ref. Range Final     Lab Results   Component Value Date    HGBA1C 5.9 (H) 09/06/2022       RD to follow up per protocol.    Electronically signed by:  Taylor Chavez RD  03/08/23 17:50 EST

## 2023-03-08 NOTE — PLAN OF CARE
Problem: Adult Inpatient Plan of Care  Goal: Plan of Care Review  Outcome: Ongoing, Progressing  Flowsheets (Taken 3/8/2023 0336)  Progress: improving  Outcome Evaluation: Pt C/O pain with PRN pain medication administered per orders with + effect noted. Mg replaced per orders. Pt uses urinal & BSC for elimination. Pt removed off Airvo during this shift with 6L high flow NC applied. Pt maintaining O2 sats on 6L high flow NC. Enhanced contact/droplet precautions continue r/t pt's COVID + status. IS at bedside. No additional C/O voiced per pt at this time. Pt resting abed on & off throughout this shift. Will continue to monitor.  Goal: Patient-Specific Goal (Individualized)  Outcome: Ongoing, Progressing  Goal: Absence of Hospital-Acquired Illness or Injury  Outcome: Ongoing, Progressing  Intervention: Identify and Manage Fall Risk  Recent Flowsheet Documentation  Taken 3/8/2023 0227 by Damian Tse RN  Safety Promotion/Fall Prevention: safety round/check completed  Taken 3/8/2023 0020 by Damian Tse RN  Safety Promotion/Fall Prevention: safety round/check completed  Taken 3/7/2023 2237 by Damian Tse RN  Safety Promotion/Fall Prevention: safety round/check completed  Taken 3/7/2023 2059 by Damian Tse RN  Safety Promotion/Fall Prevention:   safety round/check completed   fall prevention program maintained  Intervention: Prevent Skin Injury  Recent Flowsheet Documentation  Taken 3/7/2023 2059 by Damian Tse RN  Body Position: supine  Skin Protection:   adhesive use limited   tubing/devices free from skin contact  Intervention: Prevent and Manage VTE (Venous Thromboembolism) Risk  Recent Flowsheet Documentation  Taken 3/7/2023 2059 by Damian Tse RN  Activity Management: activity adjusted per tolerance  Intervention: Prevent Infection  Recent Flowsheet Documentation  Taken 3/8/2023 0227 by Damian Tse RN  Infection Prevention:   hand hygiene promoted   personal protective equipment  utilized   rest/sleep promoted   single patient room provided  Taken 3/8/2023 0020 by Damian Tse RN  Infection Prevention:   hand hygiene promoted   personal protective equipment utilized   rest/sleep promoted   single patient room provided  Taken 3/7/2023 2237 by Damian Tse RN  Infection Prevention:   hand hygiene promoted   personal protective equipment utilized   rest/sleep promoted   single patient room provided  Taken 3/7/2023 2059 by Damian Tse RN  Infection Prevention:   hand hygiene promoted   personal protective equipment utilized   rest/sleep promoted   single patient room provided  Goal: Optimal Comfort and Wellbeing  Outcome: Ongoing, Progressing  Intervention: Monitor Pain and Promote Comfort  Recent Flowsheet Documentation  Taken 3/7/2023 2059 by Damian Tse RN  Pain Management Interventions:   care clustered   diversional activity provided   pillow support provided   position adjusted   see MAR   relaxation techniques promoted   unnecessary movement minimized  Intervention: Provide Person-Centered Care  Recent Flowsheet Documentation  Taken 3/7/2023 2059 by Damian Tse RN  Trust Relationship/Rapport:   care explained   choices provided   questions answered   questions encouraged   reassurance provided   thoughts/feelings acknowledged  Goal: Readiness for Transition of Care  Outcome: Ongoing, Progressing     Problem: Chest Pain  Goal: Resolution of Chest Pain Symptoms  Outcome: Ongoing, Progressing     Problem: Asthma Comorbidity  Goal: Maintenance of Asthma Control  Outcome: Ongoing, Progressing  Intervention: Maintain Asthma Symptom Control  Recent Flowsheet Documentation  Taken 3/8/2023 0227 by Damian Tse RN  Medication Review/Management: medications reviewed  Taken 3/8/2023 0020 by Damian Tse RN  Medication Review/Management: medications reviewed  Taken 3/7/2023 2237 by Damian Tse RN  Medication Review/Management: medications reviewed  Taken 3/7/2023  2059 by Damian Tse RN  Medication Review/Management: medications reviewed     Problem: Behavioral Health Comorbidity  Goal: Maintenance of Behavioral Health Symptom Control  Outcome: Ongoing, Progressing  Intervention: Maintain Behavioral Health Symptom Control  Recent Flowsheet Documentation  Taken 3/8/2023 0227 by Damian Tse RN  Medication Review/Management: medications reviewed  Taken 3/8/2023 0020 by Damian Tse RN  Medication Review/Management: medications reviewed  Taken 3/7/2023 2237 by Damian Tse RN  Medication Review/Management: medications reviewed  Taken 3/7/2023 2059 by aDmian Tse RN  Medication Review/Management: medications reviewed     Problem: COPD (Chronic Obstructive Pulmonary Disease) Comorbidity  Goal: Maintenance of COPD Symptom Control  Outcome: Ongoing, Progressing  Intervention: Maintain COPD-Symptom Control  Recent Flowsheet Documentation  Taken 3/8/2023 0227 by Damian Tse RN  Medication Review/Management: medications reviewed  Taken 3/8/2023 0020 by Damian Tse RN  Medication Review/Management: medications reviewed  Taken 3/7/2023 2237 by Damian Tse RN  Medication Review/Management: medications reviewed  Taken 3/7/2023 2059 by Damian Tse RN  Supportive Measures:   active listening utilized   verbalization of feelings encouraged  Medication Review/Management: medications reviewed     Problem: Diabetes Comorbidity  Goal: Blood Glucose Level Within Targeted Range  Outcome: Ongoing, Progressing     Problem: Heart Failure Comorbidity  Goal: Maintenance of Heart Failure Symptom Control  Outcome: Ongoing, Progressing  Intervention: Maintain Heart Failure-Management  Recent Flowsheet Documentation  Taken 3/8/2023 0227 by Damian Tse RN  Medication Review/Management: medications reviewed  Taken 3/8/2023 0020 by Damian Tse RN  Medication Review/Management: medications reviewed  Taken 3/7/2023 2237 by Damian Tse RN  Medication  Review/Management: medications reviewed  Taken 3/7/2023 2059 by Damian Tse RN  Medication Review/Management: medications reviewed     Problem: Hypertension Comorbidity  Goal: Blood Pressure in Desired Range  Outcome: Ongoing, Progressing  Intervention: Maintain Blood Pressure Management  Recent Flowsheet Documentation  Taken 3/8/2023 0227 by Damian Tse RN  Medication Review/Management: medications reviewed  Taken 3/8/2023 0020 by Damian Tse RN  Medication Review/Management: medications reviewed  Taken 3/7/2023 2237 by Damian Tse RN  Medication Review/Management: medications reviewed  Taken 3/7/2023 2059 by Damian Tse RN  Medication Review/Management: medications reviewed     Problem: Obstructive Sleep Apnea Risk or Actual Comorbidity Management  Goal: Unobstructed Breathing During Sleep  Outcome: Ongoing, Progressing     Problem: Osteoarthritis Comorbidity  Goal: Maintenance of Osteoarthritis Symptom Control  Outcome: Ongoing, Progressing  Intervention: Maintain Osteoarthritis Symptom Control  Recent Flowsheet Documentation  Taken 3/8/2023 0227 by Damian Tse RN  Medication Review/Management: medications reviewed  Taken 3/8/2023 0020 by Damian Tse RN  Medication Review/Management: medications reviewed  Taken 3/7/2023 2237 by Damian Tse RN  Medication Review/Management: medications reviewed  Taken 3/7/2023 2059 by Damian Tse RN  Activity Management: activity adjusted per tolerance  Medication Review/Management: medications reviewed     Problem: Pain Chronic (Persistent) (Comorbidity Management)  Goal: Acceptable Pain Control and Functional Ability  Outcome: Ongoing, Progressing  Intervention: Manage Persistent Pain  Recent Flowsheet Documentation  Taken 3/8/2023 0227 by Damian Tse RN  Medication Review/Management: medications reviewed  Taken 3/8/2023 0020 by Damian Tse RN  Medication Review/Management: medications reviewed  Taken 3/7/2023 2237 by  Damian Tse, RN  Medication Review/Management: medications reviewed  Taken 3/7/2023 2059 by Damian Tse RN  Sleep/Rest Enhancement:   awakenings minimized   consistent schedule promoted   regular sleep/rest pattern promoted   relaxation techniques promoted  Medication Review/Management: medications reviewed  Intervention: Develop Pain Management Plan  Recent Flowsheet Documentation  Taken 3/7/2023 2059 by Damian Tse, RN  Pain Management Interventions:   care clustered   diversional activity provided   pillow support provided   position adjusted   see MAR   relaxation techniques promoted   unnecessary movement minimized  Intervention: Optimize Psychosocial Wellbeing  Recent Flowsheet Documentation  Taken 3/7/2023 2059 by Damian Tse RN  Supportive Measures:   active listening utilized   verbalization of feelings encouraged  Diversional Activities: television     Problem: Seizure Disorder Comorbidity  Goal: Maintenance of Seizure Control  Outcome: Ongoing, Progressing   Goal Outcome Evaluation:           Progress: improving  Outcome Evaluation: Pt C/O pain with PRN pain medication administered per orders with + effect noted. Mg replaced per orders. Pt uses urinal & BSC for elimination. Pt removed off Airvo during this shift with 6L high flow NC applied. Pt maintaining O2 sats on 6L high flow NC. Enhanced contact/droplet precautions continue r/t pt's COVID + status. IS at bedside. No additional C/O voiced per pt at this time. Pt resting abed on & off throughout this shift. Will continue to monitor.

## 2023-03-08 NOTE — PLAN OF CARE
Goal Outcome Evaluation:              Outcome Evaluation: VSS on 4L nasal cannula. pt continues to use IS. lung sounds improved today, still some audible wheezing but pt also has COPD. pt continues productive cough. BSC and urinal at bedside. pt has had 2 large bm's. precautions maintained. IV ABT completed yesterday, last dose remdesivir tonight. plan for 6 min walk tomorrow and possible discharge. pt sleeping between care and has no complaints.

## 2023-03-08 NOTE — PROGRESS NOTES
[facility] Daily Progress Note    Patient Name: Chi Ramirez II  : 1960  MRN: [id number]  Primary Care Physician:  Darell Tello DO  Date of admission: 3/4/2023      Subjective      Chief Complaint: Shortness of breath/cough      Patient Reports continued cough, although improving, shortness of breath, improved, although chronic with patient's history of COPD.  Patient denies any fevers, chills or chest pain or palpitations. Reporting no headaches today, no chronic hx of HA.     ROS   Negative except as mentioned HPI    Objective      Vitals:   Temp:  [97 °F (36.1 °C)-98.3 °F (36.8 °C)] 97.3 °F (36.3 °C)  Heart Rate:  [57-70] 63  Resp:  [14-24] 21  BP: (111-156)/(66-84) 118/66  Flow (L/min):  [4-25] 4    Physical Exam  HENT:      Head: Normocephalic.      Mouth/Throat:      Mouth: Mucous membranes are moist.   Eyes:      Extraocular Movements: Extraocular movements intact.      Pupils: Pupils are equal, round, and reactive to light.   Cardiovascular:      Rate and Rhythm: Normal rate and regular rhythm.   Pulmonary:      Effort: Pulmonary effort is normal.      Breath sounds: Wheezing and rales present.   Abdominal:      General: Bowel sounds are normal. There is no distension.      Palpations: Abdomen is soft.      Tenderness: There is no abdominal tenderness.   Neurological:      General: No focal deficit present.      Mental Status: He is alert and oriented to person, place, and time.            Result Review    Result Review:  I have personally reviewed the results from the time of this admission to 3/8/2023 12:22 EST and agree with these findings:  [x]  Laboratory  []  Microbiology  [x]  Radiology  []  EKG/Telemetry   []  Cardiology/Vascular   []  Pathology  []  Old records  []  Other:  Most notable findings include:           Assessment & Plan      Brief Patient Summary:  Chi Ramirez II is a 62 y.o. male who       amLODIPine, 2.5 mg, Oral,  Daily  aspirin, 81 mg, Oral, Daily  atorvastatin, 20 mg, Oral, Daily  dexamethasone, 6 mg, Oral, Daily  docusate sodium, 100 mg, Oral, BID  losartan, 50 mg, Oral, Q24H   And  hydroCHLOROthiazide, 12.5 mg, Oral, Q24H  ipratropium-albuterol, 3 mL, Nebulization, 4x Daily - RT  metoprolol succinate XL, 50 mg, Oral, Daily  nicotine, 1 patch, Transdermal, Q24H  polyethylene glycol, 17 g, Oral, Daily  remdesivir, 100 mg, Intravenous, Q24H  sodium chloride, 10 mL, Intravenous, Q12H       Pharmacy Consult - Remdesivir,          Active Hospital Problems:  Active Hospital Problems    Diagnosis    • **COVID-19    • Acute exacerbation of chronic obstructive pulmonary disease (COPD) (HCC)    • Chronic pain of both shoulders    • Chronic pain of left knee    • Chronic midline low back pain without sciatica    • Pure hypercholesterolemia    • Tobacco use disorder    • Hypertension      Plan:     COVID-19  Acute hypoxic respiratory failure  -Decreasing oxygen requirements, currently requiring 5LPM   -Chest x-ray reviewed, showing bilateral infiltrates consistent with viral pneumonia  -COVID-19 + 3/4/2023  -Continue remdesivir 5-day course  -Continue dexamethasone 6 mg daily  -Empiric ceftriaxone and azithromycin  -Supportive care   -As needed antitussives    Headaches  -Likely secondary to COVID  -As needed sumatriptan     Essential Hypertension  -Controlled  -Monitor BP  -Continue home amlodipine, losartan, hydrochlorothiazide, metoprolol,      Hyperlipidemia  -Continue atorvastatin     Chronic pain  -Continue home [place], inspect verified     Tobacco abuse  -Encourage cessation    DVT prophylaxis:  Mechanical DVT prophylaxis orders are present.    CODE STATUS:    Code Status (Patient has no pulse and is not breathing): CPR (Attempt to Resuscitate)  Medical Interventions (Patient has pulse or is breathing): Full Support      Disposition:  I expect patient to be discharged in 1-2 days.        Electronically signed by Prosper Atkins  MD, 03/08/23, 12:22 EST.  [facility]

## 2023-03-09 ENCOUNTER — READMISSION MANAGEMENT (OUTPATIENT)
Dept: CALL CENTER | Facility: HOSPITAL | Age: 63
End: 2023-03-09
Payer: COMMERCIAL

## 2023-03-09 VITALS
RESPIRATION RATE: 16 BRPM | TEMPERATURE: 97.5 F | HEART RATE: 83 BPM | OXYGEN SATURATION: 89 % | HEIGHT: 71 IN | WEIGHT: 293.87 LBS | BODY MASS INDEX: 41.14 KG/M2 | SYSTOLIC BLOOD PRESSURE: 128 MMHG | DIASTOLIC BLOOD PRESSURE: 69 MMHG

## 2023-03-09 PROBLEM — D89.833 CYTOKINE RELEASE SYNDROME, GRADE 3: Status: ACTIVE | Noted: 2023-03-09

## 2023-03-09 LAB
ANION GAP SERPL CALCULATED.3IONS-SCNC: 5 MMOL/L (ref 5–15)
BACTERIA SPEC AEROBE CULT: NORMAL
BACTERIA SPEC AEROBE CULT: NORMAL
BASOPHILS # BLD AUTO: 0 10*3/MM3 (ref 0–0.2)
BASOPHILS NFR BLD AUTO: 0.3 % (ref 0–1.5)
BUN SERPL-MCNC: 18 MG/DL (ref 8–23)
BUN/CREAT SERPL: 25.7 (ref 7–25)
CALCIUM SPEC-SCNC: 9 MG/DL (ref 8.6–10.5)
CHLORIDE SERPL-SCNC: 97 MMOL/L (ref 98–107)
CO2 SERPL-SCNC: 41 MMOL/L (ref 22–29)
CREAT SERPL-MCNC: 0.7 MG/DL (ref 0.76–1.27)
DEPRECATED RDW RBC AUTO: 46.4 FL (ref 37–54)
EGFRCR SERPLBLD CKD-EPI 2021: 104.2 ML/MIN/1.73
EOSINOPHIL # BLD AUTO: 0.1 10*3/MM3 (ref 0–0.4)
EOSINOPHIL NFR BLD AUTO: 0.5 % (ref 0.3–6.2)
ERYTHROCYTE [DISTWIDTH] IN BLOOD BY AUTOMATED COUNT: 13.3 % (ref 12.3–15.4)
GLUCOSE SERPL-MCNC: 114 MG/DL (ref 65–99)
HCT VFR BLD AUTO: 52.8 % (ref 37.5–51)
HGB BLD-MCNC: 16.9 G/DL (ref 13–17.7)
INR PPP: 1.06 (ref 0.93–1.1)
LYMPHOCYTES # BLD AUTO: 2.8 10*3/MM3 (ref 0.7–3.1)
LYMPHOCYTES NFR BLD AUTO: 26.8 % (ref 19.6–45.3)
MCH RBC QN AUTO: 32 PG (ref 26.6–33)
MCHC RBC AUTO-ENTMCNC: 31.9 G/DL (ref 31.5–35.7)
MCV RBC AUTO: 100.2 FL (ref 79–97)
MONOCYTES # BLD AUTO: 0.8 10*3/MM3 (ref 0.1–0.9)
MONOCYTES NFR BLD AUTO: 7.4 % (ref 5–12)
NEUTROPHILS NFR BLD AUTO: 6.7 10*3/MM3 (ref 1.7–7)
NEUTROPHILS NFR BLD AUTO: 65 % (ref 42.7–76)
NRBC BLD AUTO-RTO: 0.1 /100 WBC (ref 0–0.2)
PLATELET # BLD AUTO: 148 10*3/MM3 (ref 140–450)
PMV BLD AUTO: 8.2 FL (ref 6–12)
POTASSIUM SERPL-SCNC: 4.3 MMOL/L (ref 3.5–5.2)
PROTHROMBIN TIME: 10.9 SECONDS (ref 9.6–11.7)
RBC # BLD AUTO: 5.27 10*6/MM3 (ref 4.14–5.8)
SODIUM SERPL-SCNC: 143 MMOL/L (ref 136–145)
WBC NRBC COR # BLD: 10.4 10*3/MM3 (ref 3.4–10.8)

## 2023-03-09 PROCEDURE — 94660 CPAP INITIATION&MGMT: CPT

## 2023-03-09 PROCEDURE — 94664 DEMO&/EVAL PT USE INHALER: CPT

## 2023-03-09 PROCEDURE — 85610 PROTHROMBIN TIME: CPT

## 2023-03-09 PROCEDURE — 25010000002 REMDESIVIR 100 MG/20ML SOLUTION 1 EACH VIAL

## 2023-03-09 PROCEDURE — 94761 N-INVAS EAR/PLS OXIMETRY MLT: CPT

## 2023-03-09 PROCEDURE — 94799 UNLISTED PULMONARY SVC/PX: CPT

## 2023-03-09 PROCEDURE — 94618 PULMONARY STRESS TESTING: CPT

## 2023-03-09 PROCEDURE — 85025 COMPLETE CBC W/AUTO DIFF WBC: CPT | Performed by: STUDENT IN AN ORGANIZED HEALTH CARE EDUCATION/TRAINING PROGRAM

## 2023-03-09 PROCEDURE — 80048 BASIC METABOLIC PNL TOTAL CA: CPT | Performed by: STUDENT IN AN ORGANIZED HEALTH CARE EDUCATION/TRAINING PROGRAM

## 2023-03-09 RX ORDER — DEXAMETHASONE 6 MG/1
6 TABLET ORAL DAILY
Qty: 6 TABLET | Refills: 0 | Status: SHIPPED | OUTPATIENT
Start: 2023-03-09 | End: 2023-03-15

## 2023-03-09 RX ORDER — BENZONATATE 200 MG/1
200 CAPSULE ORAL 3 TIMES DAILY PRN
Qty: 21 CAPSULE | Refills: 0 | Status: SHIPPED | OUTPATIENT
Start: 2023-03-09 | End: 2023-03-16

## 2023-03-09 RX ADMIN — ALBUTEROL SULFATE 2 PUFF: 108 INHALANT RESPIRATORY (INHALATION) at 07:51

## 2023-03-09 RX ADMIN — ATORVASTATIN CALCIUM 20 MG: 20 TABLET, FILM COATED ORAL at 08:28

## 2023-03-09 RX ADMIN — POLYETHYLENE GLYCOL 3350 17 G: 17 POWDER, FOR SOLUTION ORAL at 08:28

## 2023-03-09 RX ADMIN — ALBUTEROL SULFATE 2 PUFF: 108 INHALANT RESPIRATORY (INHALATION) at 11:06

## 2023-03-09 RX ADMIN — Medication 10 ML: at 08:28

## 2023-03-09 RX ADMIN — AMLODIPINE BESYLATE 2.5 MG: 5 TABLET ORAL at 08:28

## 2023-03-09 RX ADMIN — HYDROCHLOROTHIAZIDE 12.5 MG: 12.5 TABLET ORAL at 08:28

## 2023-03-09 RX ADMIN — DEXAMETHASONE 6 MG: 6 TABLET ORAL at 08:28

## 2023-03-09 RX ADMIN — GUAIFENESIN 200 MG: 200 SOLUTION ORAL at 08:35

## 2023-03-09 RX ADMIN — ASPIRIN 81 MG: 81 TABLET, COATED ORAL at 08:28

## 2023-03-09 RX ADMIN — REMDESIVIR 100 MG: 100 INJECTION, POWDER, LYOPHILIZED, FOR SOLUTION INTRAVENOUS at 00:01

## 2023-03-09 RX ADMIN — HYDROCODONE BITARTRATE AND ACETAMINOPHEN 1 TABLET: 10; 325 TABLET ORAL at 08:35

## 2023-03-09 RX ADMIN — METOPROLOL SUCCINATE 50 MG: 50 TABLET, EXTENDED RELEASE ORAL at 08:28

## 2023-03-09 RX ADMIN — LOSARTAN POTASSIUM 50 MG: 50 TABLET, FILM COATED ORAL at 08:28

## 2023-03-09 RX ADMIN — Medication 10 ML: at 00:01

## 2023-03-09 RX ADMIN — DOCUSATE SODIUM 100 MG: 100 CAPSULE, LIQUID FILLED ORAL at 08:28

## 2023-03-09 RX ADMIN — NICOTINE 1 PATCH: 14 PATCH, EXTENDED RELEASE TRANSDERMAL at 08:27

## 2023-03-09 NOTE — DISCHARGE SUMMARY
[facility]  DISCHARGE SUMMARY    Patient Name: Chi Ramirez II  : 1960  MRN: [id number]    Date of Admission: 3/4/2023  Discharge Diagnosis: Acute hypoxic respiratory failure secondary to COVID-19  Date of Discharge: 3/9/2023  Primary Care Physician: Darell Tello DO      Presenting Problem:   Hypoxemia [R09.02]  Acute exacerbation of chronic obstructive pulmonary disease (COPD) (HCC) [J44.1]  Multifocal pneumonia [J18.9]  Pneumonia due to COVID-19 virus [U07.1, J12.82]  COVID-19 [U07.1]    Active and Resolved Hospital Problems:  Active Hospital Problems    Diagnosis POA   • **COVID-19 [U07.1] Yes   • Cytokine release syndrome, grade 3 [D89.833] No   • Acute exacerbation of chronic obstructive pulmonary disease (COPD) (HCC) [J44.1] Yes   • Chronic pain of both shoulders [M25.511, G89.29, M25.512] Yes   • Chronic pain of left knee [M25.562, G89.29] Yes   • Chronic midline low back pain without sciatica [M54.50, G89.29] Yes   • Pure hypercholesterolemia [E78.00] Yes   • Tobacco use disorder [F17.200] Yes   • Hypertension [I10] Yes      Resolved Hospital Problems   No resolved problems to display.         Hospital Course     Hospital Course:  Chi Ramirez II is a 62 y.o. male     COVID-19  Acute hypoxic respiratory failure  -Currently on 3 L/min, will discharge on home oxygen  -Chest x-ray reviewed, showing bilateral infiltrates consistent with viral pneumonia  -COVID-19 + 3/4/2023  -Completed remdesivir 5-day course  -Discharged to finish 10-day course of dexamethasone 6 mg daily  -Completed ceftriaxone and azithromycin  -As needed antitussives    Stable condition for DC home    DISCHARGE Follow Up Recommendations for labs and diagnostics:   -Outpatient PCP follow-up      Reasons For Change In Medications and Indications for New Medications:      Day of Discharge     Vital Signs:  Temp:  [97.4 °F (36.3 °C)-98.2 °F (36.8 °C)] 97.5 °F (36.4  °C)  Heart Rate:  [58-83] 83  Resp:  [14-25] 16  BP: (125-130)/(68-82) 128/69  Flow (L/min):  [2-4] 3    Physical Exam:  Physical Exam   HENT:      Head: Normocephalic.      Mouth/Throat:      Mouth: Mucous membranes are moist.   Eyes:      Extraocular Movements: Extraocular movements intact.      Pupils: Pupils are equal, round, and reactive to light.   Cardiovascular:      Rate and Rhythm: Normal rate and regular rhythm.   Pulmonary:      Effort: Pulmonary effort is normal.      Breath sounds: Wheezing and rales present.   Abdominal:      General: Bowel sounds are normal. There is no distension.      Palpations: Abdomen is soft.      Tenderness: There is no abdominal tenderness.   Neurological:      General: No focal deficit present.      Mental Status: He is alert and oriented to person, place, and time.     Pertinent  and/or Most Recent Results     LAB RESULTS:      Lab 03/09/23  0623 03/08/23  0713 03/07/23  0710 03/06/23  0106 03/04/23  2300 03/04/23  1954 03/04/23  1932   WBC 10.40 10.50 11.60* 16.40* 14.30*  --  14.60*   HEMOGLOBIN 16.9 17.0 15.8 17.2 17.6  --  18.7*   HEMATOCRIT 52.8* 52.2* 49.6 55.1* 54.7*  --  57.9*   PLATELETS 148 135* 138* 154 161  --  178   NEUTROS ABS 6.70 6.90 8.10* 13.20* 13.50*  --  12.00*   LYMPHS ABS 2.80 2.40 1.90 1.60 0.60*  --  1.60   MONOS ABS 0.80 1.10* 1.50* 1.50* 0.10  --  0.80   EOS ABS 0.10 0.00 0.00 0.00 0.00  --  0.10   .2* 99.7* 99.8* 102.8* 98.3*  --  98.9*   CRP  --   --   --   --   --   --  0.52*   PROCALCITONIN  --   --   --   --   --   --  0.05   LACTATE  --   --   --   --   --  0.9  --    PROTIME 10.9  --  10.3  --   --   --  10.4         Lab 03/09/23  0623 03/08/23  0714 03/07/23  0710 03/06/23  0106 03/04/23  2300   SODIUM 143 137 139 138 138   POTASSIUM 4.3 4.2 4.5 4.7 4.3   CHLORIDE 97* 94* 96* 97* 99   CO2 41.0* 37.0* 39.0* 33.0* 29.0   ANION GAP 5.0 6.0 4.0* 8.0 10.0   BUN 18 20 22 25* 18   CREATININE 0.70* 0.61* 0.71* 0.93 0.78   EGFR 104.2 108.6  103.7 92.8 100.8   GLUCOSE 114* 115* 109* 109* 139*   CALCIUM 9.0 8.6 8.8 8.7 9.1   MAGNESIUM  --   --  1.6 1.9 1.5*   PHOSPHORUS  --   --  2.3* 3.8 2.9         Lab 03/08/23  0714 03/07/23  0710 03/06/23  0106 03/05/23 0137 03/04/23 2300 03/04/23 1932   TOTAL PROTEIN 6.3 6.2 6.6 6.6 7.0 7.2   ALBUMIN 3.8 3.9 4.0 3.7 3.9 4.1   GLOBULIN  --   --   --   --   --  3.1   ALT (SGPT) 28 21 20 22 22 25   AST (SGOT) 22 18 21 22 20 23   BILIRUBIN 0.4 0.2 0.2 0.3 0.3 0.5   BILIRUBIN DIRECT <0.2 <0.2 <0.2 <0.2 <0.2  --    ALK PHOS 79 90 88 86 91 96         Lab 03/09/23  0623 03/07/23  0710 03/05/23  0137 03/04/23 2300 03/04/23 1932   PROBNP  --   --   --   --  <36.0   HSTROP T  --   --  12 14 17*   PROTIME 10.9 10.3  --   --  10.4   INR 1.06 1.00  --   --  1.01                 Lab 03/05/23  0113   PH, ARTERIAL 7.304*   PCO2, ARTERIAL 64.0*   PO2 .4*   O2 SATURATION ART 97.9   FIO2 100   HCO3 ART 31.8*   BASE EXCESS ART 2.5     Brief Urine Lab Results  (Last result in the past 365 days)      Color   Clarity   Blood   Leuk Est   Nitrite   Protein   CREAT   Urine HCG        09/06/22 0843             201.9             Microbiology Results (last 10 days)     Procedure Component Value - Date/Time    COVID-19 and FLU A/B PCR - Swab, Nasopharynx [[id number]]  (Abnormal) Collected: 03/04/23 1956    Lab Status: Final result Specimen: Swab from Nasopharynx Updated: 03/04/23 2033     COVID19 Detected     Influenza A PCR Not Detected     Influenza B PCR Not Detected    Narrative:      Fact sheet for providers: https://www.fda.gov/media/233715/download    Fact sheet for patients: https://www.fda.gov/media/456279/download    Test performed by PCR.  Influenza A and Influenza B negative results should be considered presumptive in samples that have a positive SARS-CoV-2 result.    Competitive inhibition studies showed that SARS-CoV-2 virus, when present at concentrations above 3.6E+04 copies/mL, can inhibit the detection and  amplification of influenza A and influenza B virus RNA if present at or below 1.8E+02 copies/mL or 4.9E+02 copies/mL, respectively, and may lead to false negative influenza virus results. If co-infection with influenza A or influenza B virus is suspected in samples with a positive SARS-CoV-2 result, the sample should be re-tested with another FDA cleared, approved, or authorized influenza test, if influenza virus detection would change clinical management.    Blood Culture - Blood, Blood, Venous [[id number]]  (Normal) Collected: 03/04/23 1955    Lab Status: Preliminary result Specimen: Blood, Venous Updated: 03/08/23 2015     Blood Culture No growth at 4 days    Narrative:      Less than seven (7) mL's of blood was collected.  Insufficient quantity may yield false negative results.    Blood Culture - Blood, Blood, Venous [[id number]]  (Normal) Collected: 03/04/23 1955    Lab Status: Preliminary result Specimen: Blood, Venous Updated: 03/08/23 2015     Blood Culture No growth at 4 days          XR Chest 1 View    Result Date: 3/5/2023  Impression: Impression: 1. Few scattered groundglass opacities bilaterally. Findings are nonspecific, but can be seen with an atypical infection including viral pneumonias. Electronically Signed: Aguila Lassiter  3/5/2023 7:18 AM EST  Workstation ID: [id number]                  Labs Pending at Discharge:  Pending Labs     Order Current Status    Blood Culture - Blood, Blood, Venous Preliminary result    Blood Culture - Blood, Blood, Venous Preliminary result          Procedures Performed           Consults:   Consults     No orders found from 2/3/2023 to 3/5/2023.            Discharge Details        Discharge Medications      New Medications      Instructions Start Date   benzonatate 200 MG capsule  Commonly known as: TESSALON   200 mg, Oral, 3 Times Daily PRN      dexamethasone 6 MG tablet  Commonly known as: DECADRON   6 mg, Oral, Daily         Changes to Medications      Instructions Start  Date   meloxicam 15 MG tablet  Commonly known as: MOBIC  What changed:   · when to take this  · reasons to take this   15 mg, Oral, Daily      omeprazole 40 MG capsule  Commonly known as: priLOSEC  What changed:   · how much to take  · how to take this  · when to take this  · reasons to take this   TAKE ONE CAPSULE BY MOUTH DAILY         Continue These Medications      Instructions Start Date   amLODIPine 2.5 MG tablet  Commonly known as: NORVASC   TAKE ONE TABLET BY MOUTH DAILY      aspirin 81 MG EC tablet   81 mg, Oral, Daily      atorvastatin 20 MG tablet  Commonly known as: LIPITOR   TAKE ONE TABLET BY MOUTH DAILY      docusate sodium 100 MG capsule  Commonly known as: COLACE   700 mg, Oral, Every Morning      furosemide 40 MG tablet  Commonly known as: Lasix   40 mg, Oral, Daily      HYDROcodone-acetaminophen  MG per tablet  Commonly known as: Norco   1 tablet, Oral, Every 6 Hours PRN      losartan-hydrochlorothiazide 100-25 MG per tablet  Commonly known as: HYZAAR   1 tablet, Oral, Daily      metFORMIN  MG 24 hr tablet  Commonly known as: GLUCOPHAGE-XR   500 mg, Oral, Daily With Dinner      multivitamin with minerals tablet tablet   1 tablet, Oral, Daily      naproxen sodium 220 MG tablet  Commonly known as: ALEVE   660 mg, Oral, Every Morning         Stop These Medications    metoprolol succinate XL 50 MG 24 hr tablet  Commonly known as: TOPROL-XL            No Known Allergies      Discharge Disposition: Home      Diet:  Hospital:  Diet Order   Procedures   • Diet: Cardiac Diets, Diabetic Diets; Healthy Heart (2-3 Na+); Consistent Carbohydrate; Texture: Regular Texture (IDDSI 7); Fluid Consistency: Thin (IDDSI 0)         Discharge Activity:         CODE STATUS:  Code Status and Medical Interventions:   Ordered at: 03/04/23 2137     Code Status (Patient has no pulse and is not breathing):    CPR (Attempt to Resuscitate)     Medical Interventions (Patient has pulse or is breathing):    Full Support          Future Appointments   Date Time Provider Department Center   5/22/2023  8:15 AM Darell Tello DO [facility]   5/25/2023  8:30 AM David Martinez MD [facility]           Time spent on Discharge including face to face service:  30 minutes        Signature: Electronically signed by Prosper Atkins MD, 03/09/23, 1:57 PM EST.

## 2023-03-09 NOTE — CASE MANAGEMENT/SOCIAL WORK
Continued Stay Note  [facility]     Patient Name: Chi Ramirez II  MRN: [id number]  Today's Date: 3/9/2023    Admit Date: 3/4/2023    Plan: COVID: Anticipate routine home. New home oxygen (3L) with [facility].   Discharge Plan     Row Name 03/09/23 1226       Plan    Plan COVID: Anticipate routine home. New home oxygen (3L) with [facility].    Plan Comments CM notified by RN that patient qualifies for 3L home oxygen, CM notified MD, order verified and [facility] liaison notified.                    Expected Discharge Date and Time     Expected Discharge Date Expected Discharge Time    Mar 10, 2023         Phone communication or documentation only - no physical contact with patient or family.    ANGLE PichardoN, RN    [place] [id number]    Office: [phone]  Fax: [phone]

## 2023-03-09 NOTE — PAYOR COMM NOTE
"Discharge notification for case# [id number]    =========    THANK YOU,    LESVIA Alvarez, RN  Utilization Review  [facility]  Phone: [phone]  Fax: [phone]      NPI: [id number]  Tax ID: [id number]    Chi Jerry II \"Luis\" (62 y.o. Male)     Date of Birth   1960    Social Security Number       Address   [address] [place] [address]    Home Phone   [phone]    MRN   [id number]       [facility]    Marital Status                               Admission Date   3/4/23    Admission Type   Emergency    Admitting Provider   Ina Barney MD    Attending Provider       Department, Room/Bed   [facility] PROGRESS CARE,        Discharge Date   3/9/2023    Discharge Disposition   Home or Self Care    Discharge Destination                               Attending Provider: (none)   Allergies: No Known Allergies    Isolation: Enh Drop/Con   Infection: COVID (confirmed) (23)   Code Status: CPR    Ht: 180.3 cm (71\")   Wt: 133 kg (293 lb 14 oz)    Admission Cmt: None   Principal Problem: COVID-19 [U07.1]                 Active Insurance as of 3/4/2023     Primary Coverage     Payor Plan Insurance Group Employer/Plan Group    [id number]     Payor Plan Address Payor Plan Phone Number Payor Plan Fax Number Effective Dates    [address] [address] [phone]  2018 - None Entered    [facility] [id number]       Subscriber Name Subscriber Birth Date Member ID       CHI JERRY II 1960 [id number]                 Emergency Contacts      (Rel.) Home Phone Work Phone Mobile Phone    TIANA VALERIO (Spouse) [phone] -- --               Discharge Summary      Prosper Atkins MD at [address] [place] [address] Medicine Services  DISCHARGE SUMMARY    Patient Name: Chi Jerry II  : 1960  MRN: [id number]    Date of Admission: 3/4/2023  Discharge Diagnosis: Acute hypoxic " respiratory failure secondary to COVID-19  Date of Discharge: 3/9/2023  Primary Care Physician: Darell Tello DO      Presenting Problem:   Hypoxemia [R09.02]  Acute exacerbation of chronic obstructive pulmonary disease (COPD) ([facility]) [J44.1]  Multifocal pneumonia [J18.9]  Pneumonia due to COVID-19 virus [U07.1, J12.82]  COVID-19 [U07.1]    Active and Resolved Hospital Problems:  Active Hospital Problems    Diagnosis POA   • **COVID-19 [U07.1] Yes   • Cytokine release syndrome, grade 3 [D89.833] No   • Acute exacerbation of chronic obstructive pulmonary disease (COPD) (HCC) [J44.1] Yes   • Chronic pain of both shoulders [M25.511, G89.29, M25.512] Yes   • Chronic pain of left knee [M25.562, G89.29] Yes   • Chronic midline low back pain without sciatica [M54.50, G89.29] Yes   • Pure hypercholesterolemia [E78.00] Yes   • Tobacco use disorder [F17.200] Yes   • Hypertension [I10] Yes      Resolved Hospital Problems   No resolved problems to display.         Hospital Course     Hospital Course:  Chi Ramirez II is a 62 y.o. male     COVID-19  Acute hypoxic respiratory failure  -Currently on 3 L/min, will discharge on home oxygen  -Chest x-ray reviewed, showing bilateral infiltrates consistent with viral pneumonia  -COVID-19 + 3/4/2023  -Completed remdesivir 5-day course  -Discharged to finish 10-day course of dexamethasone 6 mg daily  -Completed ceftriaxone and azithromycin  -As needed antitussives    Stable condition for DC home    DISCHARGE Follow Up Recommendations for labs and diagnostics:   -Outpatient PCP follow-up      Reasons For Change In Medications and Indications for New Medications:      Day of Discharge     Vital Signs:  Temp:  [97.4 °F (36.3 °C)-98.2 °F (36.8 °C)] 97.5 °F (36.4 °C)  Heart Rate:  [58-83] 83  Resp:  [14-25] 16  BP: (125-130)/(68-82) 128/69  Flow (L/min):  [2-4] 3    Physical Exam:  Physical Exam   HENT:      Head: Normocephalic.      Mouth/Throat:      Mouth: Mucous membranes are  moist.   Eyes:      Extraocular Movements: Extraocular movements intact.      Pupils: Pupils are equal, round, and reactive to light.   Cardiovascular:      Rate and Rhythm: Normal rate and regular rhythm.   Pulmonary:      Effort: Pulmonary effort is normal.      Breath sounds: Wheezing and rales present.   Abdominal:      General: Bowel sounds are normal. There is no distension.      Palpations: Abdomen is soft.      Tenderness: There is no abdominal tenderness.   Neurological:      General: No focal deficit present.      Mental Status: He is alert and oriented to person, place, and time.     Pertinent  and/or Most Recent Results     LAB RESULTS:      Lab 03/09/23  0623 03/08/23  0713 03/07/23  0710 03/06/23  0106 03/04/23  2300 03/04/23 1954 03/04/23  1932   WBC 10.40 10.50 11.60* 16.40* 14.30*  --  14.60*   HEMOGLOBIN 16.9 17.0 15.8 17.2 17.6  --  18.7*   HEMATOCRIT 52.8* 52.2* 49.6 55.1* 54.7*  --  57.9*   PLATELETS 148 135* 138* 154 161  --  178   NEUTROS ABS 6.70 6.90 8.10* 13.20* 13.50*  --  12.00*   LYMPHS ABS 2.80 2.40 1.90 1.60 0.60*  --  1.60   MONOS ABS 0.80 1.10* 1.50* 1.50* 0.10  --  0.80   EOS ABS 0.10 0.00 0.00 0.00 0.00  --  0.10   .2* 99.7* 99.8* 102.8* 98.3*  --  98.9*   CRP  --   --   --   --   --   --  0.52*   PROCALCITONIN  --   --   --   --   --   --  0.05   LACTATE  --   --   --   --   --  0.9  --    PROTIME 10.9  --  10.3  --   --   --  10.4         Lab 03/09/23  0623 03/08/23  0714 03/07/23  0710 03/06/23  0106 03/04/23  2300   SODIUM 143 137 139 138 138   POTASSIUM 4.3 4.2 4.5 4.7 4.3   CHLORIDE 97* 94* 96* 97* 99   CO2 41.0* 37.0* 39.0* 33.0* 29.0   ANION GAP 5.0 6.0 4.0* 8.0 10.0   BUN 18 20 22 25* 18   CREATININE 0.70* 0.61* 0.71* 0.93 0.78   EGFR 104.2 108.6 103.7 92.8 100.8   GLUCOSE 114* 115* 109* 109* 139*   CALCIUM 9.0 8.6 8.8 8.7 9.1   MAGNESIUM  --   --  1.6 1.9 1.5*   PHOSPHORUS  --   --  2.3* 3.8 2.9         Lab 03/08/23  0714 03/07/23  0710 03/06/23  0106  03/05/23 0137 03/04/23 2300 03/04/23 1932   TOTAL PROTEIN 6.3 6.2 6.6 6.6 7.0 7.2   ALBUMIN 3.8 3.9 4.0 3.7 3.9 4.1   GLOBULIN  --   --   --   --   --  3.1   ALT (SGPT) 28 21 20 22 22 25   AST (SGOT) 22 18 21 22 20 23   BILIRUBIN 0.4 0.2 0.2 0.3 0.3 0.5   BILIRUBIN DIRECT <0.2 <0.2 <0.2 <0.2 <0.2  --    ALK PHOS 79 90 88 86 91 96         Lab 03/09/23 0623 03/07/23  0710 03/05/23 0137 03/04/23 2300 03/04/23 1932   PROBNP  --   --   --   --  <36.0   HSTROP T  --   --  12 14 17*   PROTIME 10.9 10.3  --   --  10.4   INR 1.06 1.00  --   --  1.01                 Lab 03/05/23  0113   PH, ARTERIAL 7.304*   PCO2, ARTERIAL 64.0*   PO2 .4*   O2 SATURATION ART 97.9   FIO2 100   HCO3 ART 31.8*   BASE EXCESS ART 2.5     Brief Urine Lab Results  (Last result in the past 365 days)      Color   Clarity   Blood   Leuk Est   Nitrite   Protein   CREAT   Urine HCG        09/06/22 0843             201.9             Microbiology Results (last 10 days)     Procedure Component Value - Date/Time    COVID-19 and FLU A/B PCR - Swab, Nasopharynx [[id number]]  (Abnormal) Collected: 03/04/23 1956    Lab Status: Final result Specimen: Swab from Nasopharynx Updated: 03/04/23 2033     COVID19 Detected     Influenza A PCR Not Detected     Influenza B PCR Not Detected    Narrative:      Fact sheet for providers: https://www.fda.gov/media/529109/download    Fact sheet for patients: https://www.fda.gov/media/813409/download    Test performed by PCR.  Influenza A and Influenza B negative results should be considered presumptive in samples that have a positive SARS-CoV-2 result.    Competitive inhibition studies showed that SARS-CoV-2 virus, when present at concentrations above 3.6E+04 copies/mL, can inhibit the detection and amplification of influenza A and influenza B virus RNA if present at or below 1.8E+02 copies/mL or 4.9E+02 copies/mL, respectively, and may lead to false negative influenza virus results. If co-infection with influenza A  or influenza B virus is suspected in samples with a positive SARS-CoV-2 result, the sample should be re-tested with another FDA cleared, approved, or authorized influenza test, if influenza virus detection would change clinical management.    Blood Culture - Blood, Blood, Venous [[id number]]  (Normal) Collected: 03/04/23 1955    Lab Status: Preliminary result Specimen: Blood, Venous Updated: 03/08/23 2015     Blood Culture No growth at 4 days    Narrative:      Less than seven (7) mL's of blood was collected.  Insufficient quantity may yield false negative results.    Blood Culture - Blood, Blood, Venous [[id number]]  (Normal) Collected: 03/04/23 1955    Lab Status: Preliminary result Specimen: Blood, Venous Updated: 03/08/23 2015     Blood Culture No growth at 4 days          XR Chest 1 View    Result Date: 3/5/2023  Impression: Impression: 1. Few scattered groundglass opacities bilaterally. Findings are nonspecific, but can be seen with an atypical infection including viral pneumonias. Electronically Signed: Aguila Lassiter  3/5/2023 7:18 AM EST  Workstation ID: [id number]                  Labs Pending at Discharge:  Pending Labs     Order Current Status    Blood Culture - Blood, Blood, Venous Preliminary result    Blood Culture - Blood, Blood, Venous Preliminary result          Procedures Performed           Consults:   Consults     No orders found from 2/3/2023 to 3/5/2023.            Discharge Details        Discharge Medications      New Medications      Instructions Start Date   benzonatate 200 MG capsule  Commonly known as: TESSALON   200 mg, Oral, 3 Times Daily PRN      dexamethasone 6 MG tablet  Commonly known as: DECADRON   6 mg, Oral, Daily         Changes to Medications      Instructions Start Date   meloxicam 15 MG tablet  Commonly known as: MOBIC  What changed:   · when to take this  · reasons to take this   15 mg, Oral, Daily      omeprazole 40 MG capsule  Commonly known as: priLOSEC  What changed:   · how  much to take  · how to take this  · when to take this  · reasons to take this   TAKE ONE CAPSULE BY MOUTH DAILY         Continue These Medications      Instructions Start Date   amLODIPine 2.5 MG tablet  Commonly known as: NORVASC   TAKE ONE TABLET BY MOUTH DAILY      aspirin 81 MG EC tablet   81 mg, Oral, Daily      atorvastatin 20 MG tablet  Commonly known as: LIPITOR   TAKE ONE TABLET BY MOUTH DAILY      docusate sodium 100 MG capsule  Commonly known as: COLACE   700 mg, Oral, Every Morning      furosemide 40 MG tablet  Commonly known as: Lasix   40 mg, Oral, Daily      HYDROcodone-acetaminophen  MG per tablet  Commonly known as: Norco   1 tablet, Oral, Every 6 Hours PRN      losartan-hydrochlorothiazide 100-25 MG per tablet  Commonly known as: HYZAAR   1 tablet, Oral, Daily      metFORMIN  MG 24 hr tablet  Commonly known as: GLUCOPHAGE-XR   500 mg, Oral, Daily With Dinner      multivitamin with minerals tablet tablet   1 tablet, Oral, Daily      naproxen sodium 220 MG tablet  Commonly known as: ALEVE   660 mg, Oral, Every Morning         Stop These Medications    metoprolol succinate XL 50 MG 24 hr tablet  Commonly known as: TOPROL-XL            No Known Allergies      Discharge Disposition: Home      Diet:  Hospital:  Diet Order   Procedures   • Diet: Cardiac Diets, Diabetic Diets; Healthy Heart (2-3 Na+); Consistent Carbohydrate; Texture: Regular Texture (IDDSI 7); Fluid Consistency: Thin (IDDSI 0)         Discharge Activity:         CODE STATUS:  Code Status and Medical Interventions:   Ordered at: 03/04/23 2138     Code Status (Patient has no pulse and is not breathing):    CPR (Attempt to Resuscitate)     Medical Interventions (Patient has pulse or is breathing):    Full Support         Future Appointments   Date Time Provider Department Center   5/22/2023  8:15 AM Darell Tello DO [facility]   5/25/2023  8:30 AM David Martinez MD [facility]           Time spent on  Discharge including face to face service:  30 minutes        Signature: Electronically signed by Prosper Atkins MD, 03/09/23, 1:57 PM EST.      Electronically signed by Prosper Atkins MD at 03/09/23 1355       Discharge Order (From admission, onward)     Start     Ordered    03/09/23 1358  Discharge patient  Once        Expected Discharge Date: 03/09/23    Discharge Disposition: Home or Self Care    Physician of Record for Attribution - Please select from Treatment Team: PROSPER ATKINS [[id number]]    Review needed by CMO to determine Physician of Record: No       Question Answer Comment   Physician of Record for Attribution - Please select from Treatment Team PROSPER ATKINS    Review needed by CMO to determine Physician of Record No        03/09/23 0058

## 2023-03-09 NOTE — CASE MANAGEMENT/SOCIAL WORK
Case Management Discharge Note      Final Note: Home         Selected Continued Care - Discharged on 3/9/2023 Admission date: 3/4/2023 - Discharge disposition: Home or Self Care             Durable Medical Equipment Coordination complete.    Service Provider Selected Services Address Phone Fax Patient Preferred    [employer] [facility] [employer] [address] [facility] [address] [address] [phone] --         Transportation Services  Private: Car    Final Discharge Disposition Code: 01 - home or self-care

## 2023-03-09 NOTE — PROGRESS NOTES
Exercise Oximetry    Patient Name:Chi Ramirez II   MRN: [id number]   Date: 03/09/23             ROOM AIR BASELINE   SpO2% 85   Heart Rate 88   Blood Pressure      EXERCISE ON ROOM AIR SpO2% EXERCISE ON O2 @ 3 LPM SpO2%   1 MINUTE  1 MINUTE 90   2 MINUTES  2 MINUTES 88   3 MINUTES  3 MINUTES 89   4 MINUTES  4 MINUTES 89   5 MINUTES  5 MINUTES 90   6 MINUTES  6 MINUTES 90              Distance Walked   Distance Walked 6 MINS   Dyspnea (Mario Scale)   Dyspnea (Mario Scale)   Fatigue (Mario Scale)   Fatigue (Mario Scale)   SpO2% Post Exercise   SpO2% Post Exercise 91   HR Post Exercise  HR Post Exercise 89   Time to Recovery   Time to Recovery 1 MIN     Comments: On room air patient spo2 dropped to 85%, patient required 3L to maintain 88-90% spo2.

## 2023-03-09 NOTE — DISCHARGE PLACEMENT REQUEST
"Chi Jerry II \"Luis\" (62 y.o. Male)     Date of Birth   1960    Social Security Number       Address   [address] [place] [facility] [place]    Home Phone   [phone]    MRN   [id number]       [facility]    Marital Status                               Admission Date   3/4/23    Admission Type   Emergency    Admitting Provider   Ina Barney MD    Attending Provider   Prosper Atkins MD    Department, Room/Bed   [facility] CARE, 2127/1       Discharge Date       Discharge Disposition       Discharge Destination                               Attending Provider: Prosper Atkins MD    Allergies: No Known Allergies    Isolation: Enh Drop/Con   Infection: COVID (confirmed) (03/04/23)   Code Status: CPR    Ht: 180.3 cm (71\")   Wt: 133 kg (293 lb 14 oz)    Admission Cmt: None   Principal Problem: COVID-19 [U07.1]                 Active Insurance as of 3/4/2023     Primary Coverage     Payor Plan Insurance Group Employer/Plan Group    [place] [id number]     Payor Plan Address Payor Plan Phone Number Payor Plan Fax Number Effective Dates    [address] [address] [phone]  1/1/2018 - None Entered    [facility] [id number]       Subscriber Name Subscriber Birth Date Member ID       CHI JERRY II 1960 [id number]                 Emergency Contacts      (Rel.) Home Phone Work Phone Mobile Phone    TIANA VALERIO (Spouse) [phone] -- --              "

## 2023-03-09 NOTE — PLAN OF CARE
Goal Outcome Evaluation:  Plan of Care Reviewed With: patient        Progress: improving  Outcome Evaluation: Pt has been resting for most of the shift. He has been tolerating the titration of his O2 from 4L to 3L with no c/o SOA.

## 2023-03-10 ENCOUNTER — TRANSITIONAL CARE MANAGEMENT TELEPHONE ENCOUNTER (OUTPATIENT)
Dept: CALL CENTER | Facility: HOSPITAL | Age: 63
End: 2023-03-10
Payer: COMMERCIAL

## 2023-03-10 LAB — QT INTERVAL: 332 MS

## 2023-03-10 NOTE — OUTREACH NOTE
Prep Survey    Flowsheet Row Responses   [facility] facility patient discharged from? [facility]   Is LACE score < 7 ? No   Eligibility [facility]   Date of Admission 03/04/23   Date of Discharge 03/09/23   Discharge Disposition Home or Self Care   Discharge diagnosis COVID-19  Acute exacerbation of chronic obstructive pulmonary disease   Pneumonia   Does the patient have one of the following disease processes/diagnoses(primary or secondary)? Pneumonia   Does the patient have Home health ordered? No   Is there a DME ordered? Yes   What DME was ordered? home oxygen (3L) with [employer].   Prep survey completed? Yes          TIANA SEGOVIA - Registered Nurse

## 2023-03-10 NOTE — OUTREACH NOTE
Call Center TCM Note    Flowsheet Row Responses   [facility] patient discharged from? [facility]   Does the patient have one of the following disease processes/diagnoses(primary or secondary)? Pneumonia   TCM attempt successful? Yes   Call start time 1108   Call end time 1114   Discharge diagnosis COVID-19  Acute exacerbation of chronic obstructive pulmonary disease   Pneumonia   Does the patient have all medications ordered at discharge? Yes   Is the patient taking all medications as directed (includes completed medication regime)? Yes   Does the patient have an appointment with their PCP within 7 days of discharge? No   Nursing Interventions Routed TCM call to PCP office   Has home health visited the patient within 72 hours of discharge? N/A   Has all DME been delivered? Yes   Pulse Ox monitoring Intermittent   Pulse Ox device source Patient   O2 Sat comments says he has the device at home to check and he will start checking   O2 Sat: education provided Sat levels, Monitoring frequency, When to seek care   Psychosocial issues? No   Did the patient receive a copy of their discharge instructions? Yes   Nursing interventions Reviewed instructions with patient   What is the patient's perception of their health status since discharge? Improving   Nursing Interventions Nurse provided patient education   Is the patient/caregiver able to teach back the hierarchy of who to call/visit for symptoms/problems? PCP, Specialist, Home health nurse, Urgent Care, ED, 911 Yes   Is the patient/caregiver able to teach back signs and symptoms of worsening condition: Fever/chills, Shortness of breath, Chest pain   Is the patient/caregiver able to teach back importance of completing antibiotic course of treatment? --  [was not sent home on an antibiotic]   TCM call completed? Yes   Wrap up additional comments Doing well, all concerns addressed, will send message to PCP office about need f/u appt for next week.   Call end time 1114    Would this patient benefit from a Referral to [facility] Social Work? No   Is the patient interested in additional calls from an ambulatory ?  NOTE:  applies to high risk patients requiring additional follow-up. No          Mey Simmons RN    3/10/2023, 11:14 EST

## 2023-03-15 ENCOUNTER — OFFICE VISIT (OUTPATIENT)
Dept: FAMILY MEDICINE CLINIC | Facility: CLINIC | Age: 63
End: 2023-03-15
Payer: COMMERCIAL

## 2023-03-15 VITALS
DIASTOLIC BLOOD PRESSURE: 60 MMHG | HEART RATE: 83 BPM | HEIGHT: 71 IN | WEIGHT: 293 LBS | BODY MASS INDEX: 41.02 KG/M2 | SYSTOLIC BLOOD PRESSURE: 112 MMHG | OXYGEN SATURATION: 94 %

## 2023-03-15 DIAGNOSIS — F17.200 TOBACCO USE DISORDER: ICD-10-CM

## 2023-03-15 DIAGNOSIS — J12.82 PNEUMONIA DUE TO COVID-19 VIRUS: Primary | ICD-10-CM

## 2023-03-15 DIAGNOSIS — J42 CHRONIC BRONCHITIS, UNSPECIFIED CHRONIC BRONCHITIS TYPE: ICD-10-CM

## 2023-03-15 DIAGNOSIS — U07.1 PNEUMONIA DUE TO COVID-19 VIRUS: Primary | ICD-10-CM

## 2023-03-15 PROBLEM — J44.9 CHRONIC OBSTRUCTIVE PULMONARY DISEASE: Status: RESOLVED | Noted: 2019-06-12 | Resolved: 2023-03-15

## 2023-03-15 PROCEDURE — 99495 TRANSJ CARE MGMT MOD F2F 14D: CPT | Performed by: STUDENT IN AN ORGANIZED HEALTH CARE EDUCATION/TRAINING PROGRAM

## 2023-03-15 NOTE — PROGRESS NOTES
Transitional Care Follow Up Visit  Subjective     Chi Ramirez II is a 62 y.o. male who presents for a transitional care management visit.    Within 48 business hours after discharge our office contacted him via telephone to coordinate his care and needs.      I reviewed and discussed the details of that call along with the discharge summary, hospital problems, inpatient lab results, inpatient diagnostic studies, and consultation reports with Chi.     Current outpatient and discharge medications have been reconciled for the patient.  Reviewed by: Darell Tello DO    Date of TCM Phone Call 3/9/2023   [facility]   Date of Admission 3/4/2023   Date of Discharge 3/9/2023   Discharge Disposition Home or Self Care     Risk for Readmission (LACE) Score: 10 (3/9/2023  6:00 AM)    History of Present Illness   Course During Hospital Stay:  Hospitalized 3/4 - 3/9 for acute hypoxic respiratory failure 2/2 covid pneumonia.  He was discharged on home O2, 3L via nasal cannula.  He completed 5 day course of remdesevir, was discharged on dexamethasone to finish 10 day course, and completed course of ceftriaxone and azithromycin.       He has had 2 doses of pneumonia vaccines.    He has weaned himself off oxygen.      The following portions of the patient's history were reviewed and updated as appropriate: allergies, current medications, past family history, past medical history, past social history, past surgical history and problem list.    Objective    Vitals:    03/15/23 0920   BP: 112/60   Pulse: 83   SpO2: 94%     GEN: In no acute distress, non toxic appearing  HEENT: Pupils equal and reactive to light, sclera clear.   CV: Regular rate and rhythm, no murmurs  RESP: Lungs with mild inspiratory and expiratory wheezes in upper lung fields primarily.  No IWOB.  Occasional dry cough.    NEURO: AAO to person, place, and time. CN 2-12 intact grossly.   PSYCH: Affect normal, insight fair    Assessment & Plan    Diagnoses and all orders for this visit:    1. Pneumonia due to COVID-19 virus (Primary)  Overall improved.  Back on room air.  Finished remdesivir, dexamethasone, ceftriaxone and azithromycin.    2. Tobacco use disorder  Encourage smoking cessation.    3. Chronic bronchitis, unspecified chronic bronchitis type (HCC)  Gave samples of breztri to trial today.  Likely needs maintenance inhaler given multiple hospitalizations in the last 6 months d/t pneumonia.  He has had 2 pneumonia shots, will give PCV20 when he turns 66 yo.  Recommend staying up to date on covid vaccines.

## 2023-03-20 ENCOUNTER — READMISSION MANAGEMENT (OUTPATIENT)
Dept: CALL CENTER | Facility: HOSPITAL | Age: 63
End: 2023-03-20
Payer: COMMERCIAL

## 2023-03-20 NOTE — OUTREACH NOTE
COPD/PN Week 2 Survey    Flowsheet Row Responses   [facility] patient discharged from? [facility]   Does the patient have one of the following disease processes/diagnoses(primary or secondary)? Pneumonia   Week 2 attempt successful? Yes   Call start time 1454   Call end time 1459   Discharge diagnosis COVID-19  Acute exacerbation of chronic obstructive pulmonary disease   Pneumonia   Person spoke with today (if not patient) and relationship patient   Meds reviewed with patient/caregiver? Yes   Does the patient have all medications ordered at discharge? Yes   Prescription comments Pt was started on a new inhaler per PCP   Is the patient taking all medications as directed (includes completed medication regime)? Yes   Does the patient have a primary care provider?  Yes   Does the patient have an appointment with their PCP or specialist within 7 days of discharge? Yes   Comments regarding PCP Pt saw Dr. Tello, PCP.  he will go back in May to re-evaluate inhaler.   Has the patient kept scheduled appointments due by today? Yes   Pulse Ox monitoring Intermittent   Pulse Ox device source Patient   O2 Sat comments pt states he is on average of 92%   O2 Sat: education provided Sat levels, Monitoring frequency, When to seek care   Psychosocial issues? No   Is the patient/caregiver able to teach back signs and symptoms of worsening condition: Fever/chills, Shortness of breath, Chest pain   Week 2 call completed? Yes   Is the patient interested in additional calls from an ambulatory ?  NOTE:  applies to high risk patients requiring additional follow-up. No   Wrap up additional comments Pt is doing well.  he denies needs.  He was very pleased with care especially in the ED.          ROXANE STONE - Registered Nurse

## 2023-05-16 DIAGNOSIS — I10 PRIMARY HYPERTENSION: ICD-10-CM

## 2023-05-16 RX ORDER — LOSARTAN POTASSIUM AND HYDROCHLOROTHIAZIDE 25; 100 MG/1; MG/1
TABLET ORAL
Qty: 90 TABLET | Refills: 1 | Status: SHIPPED | OUTPATIENT
Start: 2023-05-16

## 2023-05-25 ENCOUNTER — OFFICE VISIT (OUTPATIENT)
Dept: PAIN MEDICINE | Facility: CLINIC | Age: 63
End: 2023-05-25
Payer: COMMERCIAL

## 2023-05-25 VITALS
DIASTOLIC BLOOD PRESSURE: 77 MMHG | OXYGEN SATURATION: 95 % | RESPIRATION RATE: 16 BRPM | HEART RATE: 76 BPM | SYSTOLIC BLOOD PRESSURE: 136 MMHG

## 2023-05-25 DIAGNOSIS — M25.512 CHRONIC PAIN OF BOTH SHOULDERS: ICD-10-CM

## 2023-05-25 DIAGNOSIS — M12.811 ROTATOR CUFF ARTHROPATHY OF BOTH SHOULDERS: ICD-10-CM

## 2023-05-25 DIAGNOSIS — M54.50 CHRONIC MIDLINE LOW BACK PAIN WITHOUT SCIATICA: Primary | ICD-10-CM

## 2023-05-25 DIAGNOSIS — G89.29 CHRONIC PAIN OF BOTH SHOULDERS: ICD-10-CM

## 2023-05-25 DIAGNOSIS — M12.812 ROTATOR CUFF ARTHROPATHY OF BOTH SHOULDERS: ICD-10-CM

## 2023-05-25 DIAGNOSIS — M25.511 CHRONIC PAIN OF BOTH SHOULDERS: ICD-10-CM

## 2023-05-25 DIAGNOSIS — G89.29 CHRONIC PAIN OF LEFT KNEE: ICD-10-CM

## 2023-05-25 DIAGNOSIS — G89.29 CHRONIC MIDLINE LOW BACK PAIN WITHOUT SCIATICA: Primary | ICD-10-CM

## 2023-05-25 DIAGNOSIS — M25.551 BILATERAL HIP PAIN: ICD-10-CM

## 2023-05-25 DIAGNOSIS — M25.562 CHRONIC PAIN OF LEFT KNEE: ICD-10-CM

## 2023-05-25 DIAGNOSIS — M25.552 BILATERAL HIP PAIN: ICD-10-CM

## 2023-05-25 DIAGNOSIS — M47.816 SPONDYLOSIS OF LUMBAR REGION WITHOUT MYELOPATHY OR RADICULOPATHY: ICD-10-CM

## 2023-05-25 DIAGNOSIS — M46.1 SACROILIITIS: ICD-10-CM

## 2023-05-25 RX ORDER — HYDROCODONE BITARTRATE AND ACETAMINOPHEN 10; 325 MG/1; MG/1
1 TABLET ORAL EVERY 6 HOURS PRN
Qty: 120 TABLET | Refills: 0 | Status: SHIPPED | OUTPATIENT
Start: 2023-05-25

## 2023-05-25 NOTE — PROGRESS NOTES
Subjective   Chi Ramirez II is a 62 y.o. male.     History of Present Illness  Chronic low back pain, nonradiating, also left knee pain and b/l hip pain, 6/10 at worst, 3/10 at best, always present, varies, began 2018 with workplace injury, burning, tingling, worse with standing and walking, interferes with ADLs, sleep, failed PT. 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 Saw PCP, notes reviewed, as above, with referral for pain management, stable on Norco 10mg TID prn, no red flags notes. No FH of substance abuse. Increased to Norco 10mg QID prn with better relief. Worsening pain over b/l SIJ radiating to front, worse with standing after sitting, climbing down from truck. Also worsening pain over right CMC joint.       The following portions of the patient's history were reviewed and updated as appropriate: allergies, current medications, past family history, past medical history, past social history, past surgical history and problem list.    Review of Systems   Constitutional: Positive for fatigue. Negative for chills and fever.   HENT: Positive for hearing loss. Negative for trouble swallowing.    Eyes: Negative for visual disturbance.   Respiratory: Positive for shortness of breath.    Cardiovascular: Negative for chest pain.   Gastrointestinal: Positive for constipation. Negative for abdominal pain, diarrhea, nausea and vomiting.   Genitourinary: Negative for urinary incontinence.   Musculoskeletal: Positive for arthralgias, back pain and joint swelling. Negative for myalgias and neck pain.   Neurological: Positive for headache. Negative for dizziness, weakness and numbness.       Objective   Physical Exam  Constitutional:       Appearance: Normal appearance. He is well-developed.   HENT:      Head: Normocephalic and atraumatic.   Eyes:      Extraocular Movements: Extraocular movements intact.      Pupils: Pupils are equal, round, and reactive to light.   Cardiovascular:      Rate and Rhythm:  Normal rate and regular rhythm.      Heart sounds: Normal heart sounds.   Pulmonary:      Effort: Pulmonary effort is normal.      Breath sounds: Normal breath sounds.   Abdominal:      General: Bowel sounds are normal. There is no distension.      Palpations: Abdomen is soft.      Tenderness: There is no abdominal tenderness.   Musculoskeletal:      Cervical back: Normal range of motion.      Comments: Pain in b/l groin with FADIR  B/l shoulder: (+) empty can test R>>L   Neurological:      Mental Status: He is alert and oriented to person, place, and time.      Sensory: No sensory deficit.      Deep Tendon Reflexes: Reflexes are normal and symmetric.   Psychiatric:         Mood and Affect: Mood normal.         Behavior: Behavior normal.         Thought Content: Thought content normal.         Judgment: Judgment normal.           Assessment & Plan   Diagnoses and all orders for this visit:    1. Chronic midline low back pain without sciatica (Primary)    2. Chronic pain of both shoulders    3. Chronic pain of left knee    4. Rotator cuff arthropathy of both shoulders    5. Sacroiliitis    6. Spondylosis of lumbar region without myelopathy or radiculopathy    7. Bilateral hip pain      UDS in order 12/3/22.  Discussed risks and benefits of opioid treatment for chonic pain with patient, including expectations related to prescription requests, alternative modalities to opioids for managing pain, her treatment plan, risks of dependency and addiction, and safe storage practices for prescribed opioids, as well as proper and improper disposal of all medications.  Treatment plan will consist of continuing current medication as long as it remains effective and is necessary, while evaluating patient at each visit and determining if the medication can be lowered or discontinued, while also using nonopioid therapies to reduce reliance on opioids.  Increased to Norco 10mg QID prn, doing well now.  Constipation controlled with  OTC stool softener.  Discussed MBB and RFA of b/l L3-5 facet joints in depth, he will consider it.  Performed b/l SIJ injections with good relief.  Performed b/l shoulder subacromial injections with same-day relief only, will not repeat, declines referral for surgical eval at this time.  May inject L knee, b/l hips in future.  RTC in 3 months for f/u.      INSPECT REPORT     As part of the patient's treatment plan, I am prescribing controlled substances. The patient has been made aware of appropriate use of such medications, including potential risk of somnolence, limited ability to drive and/or work safely, and the potential for dependence or overdose. It has also bee made clear that these medications are for use by this patient only, without concomitant use of alcohol or other substances unless prescribed.      Patient has completed prescribing agreement detailing terms of continued prescribing of controlled substances, including monitoring INSPECT reports, urine drug screening, and pill counts if necessary. The patient is aware that inappropriate use will results in cessation of prescribing such medications.     INSPECT report has been reviewed and scanned into the patient's chart.     As the clinician, I personally reviewed the INSPECT while the patient was in the office today.     History and physical exam exhibit continued safe and appropriate use of controlled substances.

## 2023-08-14 ENCOUNTER — APPOINTMENT (OUTPATIENT)
Dept: GENERAL RADIOLOGY | Facility: HOSPITAL | Age: 63
End: 2023-08-14
Payer: COMMERCIAL

## 2023-08-14 ENCOUNTER — HOSPITAL ENCOUNTER (EMERGENCY)
Facility: HOSPITAL | Age: 63
Discharge: HOME | End: 2023-08-14
Attending: EMERGENCY MEDICINE | Admitting: EMERGENCY MEDICINE
Payer: COMMERCIAL

## 2023-08-14 VITALS
DIASTOLIC BLOOD PRESSURE: 80 MMHG | HEIGHT: 71 IN | TEMPERATURE: 98.2 F | BODY MASS INDEX: 42 KG/M2 | WEIGHT: 300 LBS | OXYGEN SATURATION: 97 % | RESPIRATION RATE: 18 BRPM | HEART RATE: 70 BPM | SYSTOLIC BLOOD PRESSURE: 140 MMHG

## 2023-08-14 DIAGNOSIS — M25.561 ACUTE PAIN OF RIGHT KNEE: Primary | ICD-10-CM

## 2023-08-14 PROCEDURE — 63710000001 ONDANSETRON ODT 4 MG TABLET DISPERSIBLE: Performed by: PHYSICIAN ASSISTANT

## 2023-08-14 PROCEDURE — 73562 X-RAY EXAM OF KNEE 3: CPT

## 2023-08-14 PROCEDURE — 25010000002 MORPHINE PER 10 MG: Performed by: PHYSICIAN ASSISTANT

## 2023-08-14 PROCEDURE — 99283 EMERGENCY DEPT VISIT LOW MDM: CPT

## 2023-08-14 PROCEDURE — 96374 THER/PROPH/DIAG INJ IV PUSH: CPT

## 2023-08-14 RX ORDER — ONDANSETRON 4 MG/1
4 TABLET, ORALLY DISINTEGRATING ORAL ONCE
Status: COMPLETED | OUTPATIENT
Start: 2023-08-14 | End: 2023-08-14

## 2023-08-14 RX ADMIN — MORPHINE SULFATE 4 MG: 4 INJECTION, SOLUTION INTRAMUSCULAR; INTRAVENOUS at 11:51

## 2023-08-14 RX ADMIN — ONDANSETRON 4 MG: 4 TABLET, ORALLY DISINTEGRATING ORAL at 11:36

## 2023-08-14 NOTE — ED PROVIDER NOTES
Subjective   History of Present Illness  Patient is a 62-year-old male presents to the ED with complaints of right knee pain after he stood up earlier today.  Patient describes as a constant sharp pain that he currently rates as severe.  States the pain is nonradiating from his knee.  He states the pain is worse with ambulation better with rest.  Denies any prior surgeries to this knee.  Patient is unsure if he has had any swelling as he has looked at it.  He denies hearing or feeling any pop.  No paresthesias numbness weakness of his lower extremity.  Of note patient did a lot of yard work and was on his hands and knees yesterday.  He reports chronic gait issues secondary to prior toe amputations.  Patient states he is chronically on narcotic pain medicine last took around 5 AM      Review of Systems   Constitutional:  Negative for fever.   Gastrointestinal:  Negative for nausea and vomiting.   Musculoskeletal:  Positive for arthralgias.   Skin:  Negative for color change, rash and wound.   Neurological:  Negative for weakness and numbness.     Past Medical History:   Diagnosis Date    Allergic rhinitis     Amputation, toe, traumatic with complication     Multiple toes    Asthma     Chronic leg pain     Chronic low back pain     Condyloma acuminata     COPD (chronic obstructive pulmonary disease)     Did not respond to inhalers    COVID     10/2022    Erectile dysfunction     Genital herpes     Hearing loss     Hyperlipidemia     Hypersomnia     Felt secondary to sleep apnea which patient will not get tested    Hypertension     Kidney stone     Myalgia     Obesity     Osteoarthritis     Rib fractures     Vitamin D deficiency        No Known Allergies    Past Surgical History:   Procedure Laterality Date    AMPUTATION FOOT / TOE Left     4 toes     LIPOMA EXCISION      OTHER SURGICAL HISTORY      LT KNEE MENISCAL TEAR    OTHER SURGICAL HISTORY Left     SHOULDER ROTATOR CUFF    VASECTOMY         Family History    Problem Relation Age of Onset    Aneurysm Mother         Brain    Diabetes Mother     Coronary artery disease Mother     Lung cancer Father     Coronary artery disease Father     Dementia Father     Diabetes Sister     Alcohol abuse Sister     Seizures Sister     No Known Problems Brother     No Known Problems Half-Sister        Social History     Socioeconomic History    Marital status:    Tobacco Use    Smoking status: Every Day     Packs/day: 1.50     Years: 25.00     Pack years: 37.50     Types: Cigarettes    Smokeless tobacco: Never    Tobacco comments:     stop smoking   Vaping Use    Vaping Use: Never used   Substance and Sexual Activity    Alcohol use: Yes     Comment: rare    Drug use: Never    Sexual activity: Not Currently           Objective   Physical Exam  Vitals and nursing note reviewed.   Constitutional:       General: He is not in acute distress.     Appearance: He is well-developed. He is obese. He is not ill-appearing, toxic-appearing or diaphoretic.   HENT:      Head: Normocephalic and atraumatic.      Mouth/Throat:      Mouth: Mucous membranes are moist.      Pharynx: Oropharynx is clear.   Eyes:      General: No scleral icterus.     Extraocular Movements: Extraocular movements intact.      Pupils: Pupils are equal, round, and reactive to light.   Cardiovascular:      Rate and Rhythm: Normal rate and regular rhythm.      Pulses: Normal pulses.      Heart sounds: No murmur heard.    No friction rub. No gallop.   Pulmonary:      Effort: Pulmonary effort is normal. No tachypnea, accessory muscle usage or respiratory distress.      Breath sounds: Normal breath sounds. No stridor. No decreased breath sounds, wheezing, rhonchi or rales.   Chest:      Chest wall: No mass, deformity, tenderness or crepitus.   Musculoskeletal:      Right upper leg: No tenderness or bony tenderness.      Right knee: Swelling present. No deformity, erythema, ecchymosis or lacerations. Decreased range of motion.  "Tenderness present.      Right lower leg: No deformity or bony tenderness.      Right ankle: Normal.      Right foot: Normal.      Comments: Peripheral pulses are intact compartments are soft of bilateral lower extremities.  Patient does have some decreased range of motion about the right knee secondary to pain with flexion extension.  Normal joint laxity with varus valgus stress.  He does have some edema noted along the anterior aspect without any erythema, ecchymosis, or warmth.  Able to do straight leg raise without significant difficulty or pain in the knee.  Nontender to palpation on exam   Skin:     General: Skin is warm.      Capillary Refill: Capillary refill takes less than 2 seconds.      Findings: No rash.   Neurological:      Mental Status: He is alert and oriented to person, place, and time.   Psychiatric:         Mood and Affect: Mood normal.         Behavior: Behavior normal.       Procedures           ED Course    /87   Pulse 74   Temp 97.9 øF (36.6 øC) (Oral)   Resp 16   Ht 180.3 cm (71\")   Wt 136 kg (300 lb)   SpO2 95%   BMI 41.84 kg/mý   Medications   ondansetron ODT (ZOFRAN-ODT) disintegrating tablet 4 mg (4 mg Oral Given 8/14/23 1136)   morphine injection 4 mg (4 mg Intravenous Given 8/14/23 1151)     Labs Reviewed - No data to display  XR Knee 3 View Right   Final Result   Impression:      1. Moderate tricompartmental degenerative changes.         Electronically Signed: Aguila Lassiter MD     8/14/2023 12:14 PM EDT     Workstation ID: [id number]                                               Medical Decision Making  Chart Review: Pain management note reviewed from 5/25/2023 chronic back pain usually radiating to his left leg    Comorbidity: Past medical history  Differentials: Fracture, Dislocation, contusion, sprain, strain     ;this list is not all inclusive and does not constitute the entirety of considered causes  Radiology: My interpretation right knee x-ray shows no obvious " fracture or dislocation correlated with radiologist interpretation as below  XR Knee 3 View Right   Final Result    Impression:    1. Moderate tricompartmental degenerative changes.      Electronically Signed: Aguila Lassiter MD      8/14/2023 12:14 PM EDT      Workstation ID: [id number]     Disposition/Treatment:  Appropriate PPE was worn during exam and throughout all encounters with the patient.  When the ED patient was afebrile and appeared nontoxic presented with complaints of right knee pain that happened suddenly today.  He does have some swelling but no signs of cellulitis or secondary infection otherwise with no erythema or warmth.  He also denies any fever or chills.  That was also considered but thought less likely because of patient's symptoms.  He does report doing a lot of yard work on his hands and knees yesterday which could be an contributing factor.  X-ray was obtained which does show moderate tricompartmental degenerative changes.  Patient was placed in a knee sleeve/hinged brace.  He was distally neurovascular intact and replaced with good capillary refill.  Patient states he will use a cane that he has at home for ambulation. he was offered crutches and a walker while in the ED.  Patient is chronically on narcotic pain medicine advised to continue as previously directed.  Patient was advised to follow-up with orthopedist for continued evaluation of his pain.     findings were discussed with the patient and family at bedside who voiced understanding of discharge instructions along with signs and symptoms requiring return to the ED.  Upon discharged patient was in stable condition with followup for a revaluation.  Patient was also given a work note    This document is intended for medical expert use only. Reading of this document by patients and/or patient's family without participating medical staff guidance may result in misinterpretation and unintended morbidity.  Any interpretation of such data is  the responsibility of the patient and/or family member responsible for the patient in concert with their primary or specialist providers, not to be left for sources of online searches such as [employer], [employer] or similar queries. Relying on these approaches to knowledge may result in misinterpretation, misguided goals of care and even death should patients or family members try recommendations outside of the realm of professional medical care in a supervised inpatient environment.       Amount and/or Complexity of Data Reviewed  External Data Reviewed: notes.  Radiology: ordered. Decision-making details documented in ED Course.    Risk  Prescription drug management.        Final diagnoses:   Acute pain of right knee       ED Disposition  ED Disposition       ED Disposition   Discharge    Condition   Stable    Comment   --               Darell Tello,   [address]  [place] [address] [phone]    Schedule an appointment as soon as possible for a visit in 3 days      [facility] EMERGENCY DEPARTMENT  [address] [address] [phone]  Go to   If symptoms worsen    Brook Lopes MD  [address] [place] [address] [phone]    Schedule an appointment as soon as possible for a visit   If your knee pain continues         Medication List        Changed      omeprazole 40 MG capsule  Commonly known as: priLOSEC  TAKE ONE CAPSULE BY MOUTH DAILY  What changed:   how much to take  how to take this  when to take this  reasons to take this                 Hany Zamarripa PA  08/14/23 4211

## 2023-08-14 NOTE — DISCHARGE INSTRUCTIONS
Use knee brace to your right knee for the next 5-7 days; perform range of motion exercises 3-4 times daily as instructed; apply ice for 20 minutes at a time for the next 48 hours to reduce swelling. Return for worsening symptoms including increased pain, swelling, discoloration, or coldness of a extremity.    Follow-up with orthopedics for continued valuation of your pain    Continue pain medicine as previously directed.    Follow-up with your primary care provider in 3-5 days.  If you do not have a primary care provider call [phone] for help in finding one, or you may follow up with [facility] at [phone].    Return to ED for any new or worsening symptoms

## 2023-08-14 NOTE — Clinical Note
[facility] EMERGENCY DEPARTMENT  [address] [place] [address]  Phone: [phone]    Chi Ramirez II was seen and treated in our emergency department on 8/14/2023.  He may return to work on 08/16/2023.         Thank you for choosing [facility].    Hany Zamarripa PA

## 2023-08-24 ENCOUNTER — OFFICE VISIT (OUTPATIENT)
Dept: PAIN MEDICINE | Facility: CLINIC | Age: 63
End: 2023-08-24
Payer: COMMERCIAL

## 2023-08-24 VITALS
DIASTOLIC BLOOD PRESSURE: 88 MMHG | RESPIRATION RATE: 16 BRPM | SYSTOLIC BLOOD PRESSURE: 145 MMHG | OXYGEN SATURATION: 93 % | HEART RATE: 65 BPM

## 2023-08-24 DIAGNOSIS — M25.562 CHRONIC PAIN OF LEFT KNEE: ICD-10-CM

## 2023-08-24 DIAGNOSIS — M25.512 CHRONIC PAIN OF BOTH SHOULDERS: ICD-10-CM

## 2023-08-24 DIAGNOSIS — M46.1 SACROILIITIS: ICD-10-CM

## 2023-08-24 DIAGNOSIS — M25.511 CHRONIC PAIN OF BOTH SHOULDERS: ICD-10-CM

## 2023-08-24 DIAGNOSIS — M47.816 SPONDYLOSIS OF LUMBAR REGION WITHOUT MYELOPATHY OR RADICULOPATHY: ICD-10-CM

## 2023-08-24 DIAGNOSIS — M54.50 CHRONIC MIDLINE LOW BACK PAIN WITHOUT SCIATICA: Primary | ICD-10-CM

## 2023-08-24 DIAGNOSIS — G89.29 CHRONIC PAIN OF LEFT KNEE: ICD-10-CM

## 2023-08-24 DIAGNOSIS — G89.29 CHRONIC PAIN OF BOTH SHOULDERS: ICD-10-CM

## 2023-08-24 DIAGNOSIS — G89.29 CHRONIC MIDLINE LOW BACK PAIN WITHOUT SCIATICA: Primary | ICD-10-CM

## 2023-08-24 RX ORDER — HYDROCODONE BITARTRATE AND ACETAMINOPHEN 10; 325 MG/1; MG/1
1 TABLET ORAL EVERY 6 HOURS PRN
Qty: 120 TABLET | Refills: 0 | Status: SHIPPED | OUTPATIENT
Start: 2023-08-24

## 2023-08-24 NOTE — PROGRESS NOTES
Subjective   Chi Ramirez II is a 62 y.o. male.     History of Present Illness  Chronic low back pain, nonradiating, also left knee pain and b/l hip pain, 6/10 at worst, 3/10 at best, always present, varies, began 2018 with workplace injury, burning, tingling, worse with standing and walking, interferes with ADLs, sleep, failed PT. 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 Saw PCP, notes reviewed, as above, with referral for pain management, stable on Norco 10mg TID prn, no red flags notes. No FH of substance abuse. Increased to Norco 10mg QID prn with better relief. Worsening pain over b/l SIJ radiating to front, worse with standing after sitting, climbing down from truck. Also worsening pain over right CMC joint. Had acute pain in R  knee, went to ED, seen by Ortho, had knee drained and injected with steroid with some relief, upcoming f/u appt to review MRI.     The following portions of the patient's history were reviewed and updated as appropriate: allergies, current medications, past family history, past medical history, past social history, past surgical history and problem list.    Review of Systems   Constitutional:  Positive for fatigue. Negative for chills and fever.   HENT:  Positive for hearing loss. Negative for trouble swallowing.    Eyes:  Negative for visual disturbance.   Respiratory:  Positive for shortness of breath.    Cardiovascular:  Negative for chest pain.   Gastrointestinal:  Positive for constipation. Negative for abdominal pain, diarrhea, nausea and vomiting.   Genitourinary:  Negative for urinary incontinence.   Musculoskeletal:  Positive for arthralgias, back pain and joint swelling. Negative for myalgias and neck pain.   Neurological:  Positive for headache. Negative for dizziness, weakness and numbness.     Objective   Physical Exam  Constitutional:       Appearance: Normal appearance. He is well-developed.   HENT:      Head: Normocephalic and atraumatic.   Eyes:       Extraocular Movements: Extraocular movements intact.      Pupils: Pupils are equal, round, and reactive to light.   Cardiovascular:      Rate and Rhythm: Normal rate and regular rhythm.      Heart sounds: Normal heart sounds.   Pulmonary:      Effort: Pulmonary effort is normal.      Breath sounds: Normal breath sounds.   Abdominal:      General: Bowel sounds are normal. There is no distension.      Palpations: Abdomen is soft.      Tenderness: There is no abdominal tenderness.   Musculoskeletal:      Cervical back: Normal range of motion.      Comments: Pain in b/l groin with FADIR  B/l shoulder: (+) empty can test R>>L   Neurological:      Mental Status: He is alert and oriented to person, place, and time.      Sensory: No sensory deficit.      Deep Tendon Reflexes: Reflexes are normal and symmetric.   Psychiatric:         Mood and Affect: Mood normal.         Behavior: Behavior normal.         Thought Content: Thought content normal.         Judgment: Judgment normal.         Assessment & Plan   Diagnoses and all orders for this visit:    1. Chronic midline low back pain without sciatica (Primary)    2. Chronic pain of both shoulders    3. Chronic pain of left knee    4. Sacroiliitis    5. Spondylosis of lumbar region without myelopathy or radiculopathy      UDS in order 12/3/22.  Discussed risks and benefits of opioid treatment for chonic pain with patient, including expectations related to prescription requests, alternative modalities to opioids for managing pain, her treatment plan, risks of dependency and addiction, and safe storage practices for prescribed opioids, as well as proper and improper disposal of all medications.  Treatment plan will consist of continuing current medication as long as it remains effective and is necessary, while evaluating patient at each visit and determining if the medication can be lowered or discontinued, while also using nonopioid therapies to reduce reliance on  opioids.  Increased to Norco 10mg QID prn, doing well now.  Constipation controlled with OTC stool softener.  Discussed MBB and RFA of b/l L3-5 facet joints in depth, he will consider it.  Performed b/l SIJ injections with good relief.  Performed b/l shoulder subacromial injections with same-day relief only, will not repeat, declines referral for surgical eval at this time.  May inject L knee, b/l hips in future.  RTC in 3 months for f/u.      INSPECT REPORT     As part of the patient's treatment plan, I am prescribing controlled substances. The patient has been made aware of appropriate use of such medications, including potential risk of somnolence, limited ability to drive and/or work safely, and the potential for dependence or overdose. It has also bee made clear that these medications are for use by this patient only, without concomitant use of alcohol or other substances unless prescribed.      Patient has completed prescribing agreement detailing terms of continued prescribing of controlled substances, including monitoring INSPECT reports, urine drug screening, and pill counts if necessary. The patient is aware that inappropriate use will results in cessation of prescribing such medications.     INSPECT report has been reviewed and scanned into the patient's chart.     As the clinician, I personally reviewed the INSPECT while the patient was in the office today.     History and physical exam exhibit continued safe and appropriate use of controlled substances.

## 2023-08-29 ENCOUNTER — TELEPHONE (OUTPATIENT)
Dept: FAMILY MEDICINE CLINIC | Facility: CLINIC | Age: 63
End: 2023-08-29
Payer: COMMERCIAL

## 2023-08-29 DIAGNOSIS — G89.29 CHRONIC PAIN OF RIGHT KNEE: Primary | ICD-10-CM

## 2023-08-29 DIAGNOSIS — M25.561 CHRONIC PAIN OF RIGHT KNEE: Primary | ICD-10-CM

## 2023-08-29 DIAGNOSIS — S83.206D ACUTE TORN MENISCUS OF KNEE, RIGHT, SUBSEQUENT ENCOUNTER: ICD-10-CM

## 2023-08-29 NOTE — TELEPHONE ENCOUNTER
Patient seen in ER on 8/14/23.  He was referred to [facility] from there.  He was evaluated by Dr. Lopes and got an MRI done which shows a torn meniscus.  Dr. Lopes recommended PT.  Patient would like a second opinion and is requesting a referral to another orthopedic surgeon.  Wife said she has a copy of the reports and will bring a copy in.

## 2023-09-09 DIAGNOSIS — I10 PRIMARY HYPERTENSION: ICD-10-CM

## 2023-09-11 ENCOUNTER — TELEPHONE (OUTPATIENT)
Dept: FAMILY MEDICINE CLINIC | Facility: CLINIC | Age: 63
End: 2023-09-11
Payer: COMMERCIAL

## 2023-09-11 DIAGNOSIS — I10 PRIMARY HYPERTENSION: ICD-10-CM

## 2023-09-11 RX ORDER — AMLODIPINE BESYLATE 2.5 MG/1
2.5 TABLET ORAL DAILY
Qty: 90 TABLET | Refills: 0 | Status: SHIPPED | OUTPATIENT
Start: 2023-09-11 | End: 2023-09-12 | Stop reason: SDUPTHER

## 2023-09-11 RX ORDER — AMLODIPINE BESYLATE 2.5 MG/1
TABLET ORAL
Qty: 90 TABLET | Refills: 0 | Status: SHIPPED | OUTPATIENT
Start: 2023-09-11 | End: 2023-09-11 | Stop reason: SDUPTHER

## 2023-09-11 RX ORDER — ATORVASTATIN CALCIUM 20 MG/1
20 TABLET, FILM COATED ORAL DAILY
Qty: 90 TABLET | Refills: 0 | Status: SHIPPED | OUTPATIENT
Start: 2023-09-11 | End: 2023-09-12 | Stop reason: SDUPTHER

## 2023-09-11 RX ORDER — ATORVASTATIN CALCIUM 20 MG/1
TABLET, FILM COATED ORAL
Qty: 90 TABLET | Refills: 0 | Status: SHIPPED | OUTPATIENT
Start: 2023-09-11 | End: 2023-09-11 | Stop reason: SDUPTHER

## 2023-09-11 NOTE — TELEPHONE ENCOUNTER
Caller: TIANA VALERIO    Relationship: Emergency Contact    Best call back number: [phone]    Requested Prescriptions:   amLODIPine (NORVASC) 2.5 MG tablet     atorvastatin (LIPITOR) 20 MG tablet        Pharmacy where request should be sent:  [id number] - [place], [place] - [address] - [phone]  - [phone] FX     Last office visit with prescribing clinician: 3/15/2023   Last telemedicine visit with prescribing clinician: Visit date not found   Next office visit with prescribing clinician: Visit date not found     Additional details provided by patient:  PATIENT'S WIFE TIANA CALLED TO REQUEST A MEDICATION REFILL ON PATIENT'S MEDICATION. TIANA STATES THAT HE IS COMPLETELY OUT MEDICATION.        Does the patient have less than a 3 day supply:  [x] Yes  [] No    Would you like a call back once the refill request has been completed: [] Yes [] No    If the office needs to give you a call back, can they leave a voicemail: [] Yes [] No    Jm Johnson Rep   09/11/23 14:17 EDT         DELETE AFTER READING TO PATIENT: “Thank you for sharing this information with me. I will send a message to the clinical team. Please allow 48 hours for the clinical staff to follow up on this request.”

## 2023-09-12 RX ORDER — ATORVASTATIN CALCIUM 20 MG/1
TABLET, FILM COATED ORAL
Qty: 90 TABLET | Refills: 0 | Status: SHIPPED | OUTPATIENT
Start: 2023-09-12

## 2023-09-12 RX ORDER — AMLODIPINE BESYLATE 2.5 MG/1
TABLET ORAL
Qty: 90 TABLET | Refills: 0 | Status: SHIPPED | OUTPATIENT
Start: 2023-09-12

## 2023-11-02 RX ORDER — HYDROCODONE BITARTRATE AND ACETAMINOPHEN 7.5; 325 MG/1; MG/1
1 TABLET ORAL EVERY 6 HOURS PRN
Qty: 120 TABLET | Refills: 0 | Status: SHIPPED | OUTPATIENT
Start: 2023-11-02

## 2023-11-16 ENCOUNTER — OFFICE VISIT (OUTPATIENT)
Dept: PAIN MEDICINE | Facility: CLINIC | Age: 63
End: 2023-11-16
Payer: COMMERCIAL

## 2023-11-16 VITALS
HEART RATE: 72 BPM | DIASTOLIC BLOOD PRESSURE: 79 MMHG | OXYGEN SATURATION: 94 % | SYSTOLIC BLOOD PRESSURE: 136 MMHG | RESPIRATION RATE: 16 BRPM

## 2023-11-16 DIAGNOSIS — G89.29 CHRONIC MIDLINE LOW BACK PAIN WITHOUT SCIATICA: ICD-10-CM

## 2023-11-16 DIAGNOSIS — M46.1 SACROILIITIS: ICD-10-CM

## 2023-11-16 DIAGNOSIS — M12.812 ROTATOR CUFF ARTHROPATHY OF BOTH SHOULDERS: ICD-10-CM

## 2023-11-16 DIAGNOSIS — M25.511 CHRONIC PAIN OF BOTH SHOULDERS: ICD-10-CM

## 2023-11-16 DIAGNOSIS — Z79.899 HIGH RISK MEDICATION USE: Primary | ICD-10-CM

## 2023-11-16 DIAGNOSIS — G89.29 CHRONIC PAIN OF BOTH SHOULDERS: ICD-10-CM

## 2023-11-16 DIAGNOSIS — M12.811 ROTATOR CUFF ARTHROPATHY OF BOTH SHOULDERS: ICD-10-CM

## 2023-11-16 DIAGNOSIS — M54.50 CHRONIC MIDLINE LOW BACK PAIN WITHOUT SCIATICA: ICD-10-CM

## 2023-11-16 DIAGNOSIS — M25.562 CHRONIC PAIN OF LEFT KNEE: ICD-10-CM

## 2023-11-16 DIAGNOSIS — M47.816 SPONDYLOSIS OF LUMBAR REGION WITHOUT MYELOPATHY OR RADICULOPATHY: ICD-10-CM

## 2023-11-16 DIAGNOSIS — M25.512 CHRONIC PAIN OF BOTH SHOULDERS: ICD-10-CM

## 2023-11-16 DIAGNOSIS — G89.29 CHRONIC PAIN OF LEFT KNEE: ICD-10-CM

## 2023-11-16 RX ORDER — HYDROCODONE BITARTRATE AND ACETAMINOPHEN 10; 325 MG/1; MG/1
1 TABLET ORAL EVERY 6 HOURS PRN
Qty: 120 TABLET | Refills: 0 | Status: SHIPPED | OUTPATIENT
Start: 2023-11-16

## 2023-11-16 NOTE — PROGRESS NOTES
Subjective   Chi Ramirez II is a 62 y.o. male.     History of Present Illness  Chronic low back pain, nonradiating, also left knee pain and b/l hip pain, 6/10 at worst, 3/10 at best, always present, varies, began 2018 with workplace injury, burning, tingling, worse with standing and walking, interferes with ADLs, sleep, failed PT. 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 Saw PCP, notes reviewed, as above, with referral for pain management, stable on Norco 10mg TID prn, no red flags notes. No FH of substance abuse. Increased to Norco 10mg QID prn with better relief. Worsening pain over b/l SIJ radiating to front, worse with standing after sitting, climbing down from truck. Also worsening pain over right CMC joint. Had acute pain in R  knee, went to ED, seen by Ortho, had knee drained and injected with steroid with some relief, upcoming f/u appt to review MRI.  Back Pain  Pertinent negatives include no abdominal pain, bladder incontinence, chest pain, fever, numbness or weakness.        The following portions of the patient's history were reviewed and updated as appropriate: allergies, current medications, past family history, past medical history, past social history, past surgical history and problem list.    Review of Systems   Constitutional:  Positive for fatigue. Negative for chills and fever.   HENT:  Positive for hearing loss. Negative for trouble swallowing.    Eyes:  Negative for visual disturbance.   Respiratory:  Positive for shortness of breath.    Cardiovascular:  Negative for chest pain.   Gastrointestinal:  Positive for constipation. Negative for abdominal pain, diarrhea, nausea and vomiting.   Genitourinary:  Negative for urinary incontinence.   Musculoskeletal:  Positive for arthralgias, back pain and joint swelling. Negative for myalgias and neck pain.   Neurological:  Positive for headache. Negative for dizziness, weakness and numbness.       Objective   Physical  Exam  Constitutional:       Appearance: Normal appearance. He is well-developed.   HENT:      Head: Normocephalic and atraumatic.   Eyes:      Extraocular Movements: Extraocular movements intact.      Pupils: Pupils are equal, round, and reactive to light.   Cardiovascular:      Rate and Rhythm: Normal rate and regular rhythm.      Heart sounds: Normal heart sounds.   Pulmonary:      Effort: Pulmonary effort is normal.      Breath sounds: Normal breath sounds.   Abdominal:      General: Bowel sounds are normal. There is no distension.      Palpations: Abdomen is soft.      Tenderness: There is no abdominal tenderness.   Musculoskeletal:      Cervical back: Normal range of motion.      Comments: Pain in b/l groin with FADIR  B/l shoulder: (+) empty can test R>>L   Neurological:      Mental Status: He is alert and oriented to person, place, and time.      Sensory: No sensory deficit.      Deep Tendon Reflexes: Reflexes are normal and symmetric.   Psychiatric:         Mood and Affect: Mood normal.         Behavior: Behavior normal.         Thought Content: Thought content normal.         Judgment: Judgment normal.           Assessment & Plan   Diagnoses and all orders for this visit:    1. Chronic midline low back pain without sciatica (Primary)    2. Chronic pain of both shoulders    3. Chronic pain of left knee    4. Rotator cuff arthropathy of both shoulders    5. Sacroiliitis    6. Spondylosis of lumbar region without myelopathy or radiculopathy      UDS in order 12/3/22.  Discussed risks and benefits of opioid treatment for chonic pain with patient, including expectations related to prescription requests, alternative modalities to opioids for managing pain, her treatment plan, risks of dependency and addiction, and safe storage practices for prescribed opioids, as well as proper and improper disposal of all medications.  Treatment plan will consist of continuing current medication as long as it remains effective  and is necessary, while evaluating patient at each visit and determining if the medication can be lowered or discontinued, while also using nonopioid therapies to reduce reliance on opioids.  Increased to Norco 10mg QID prn, doing well now. Filled 11/2/23.   Constipation controlled with OTC stool softener.  Discussed MBB and RFA of b/l L3-5 facet joints in depth, he will consider it.  Performed b/l SIJ injections with good relief.  Performed b/l shoulder subacromial injections with same-day relief only, will not repeat, declines referral for surgical eval at this time.  May inject L knee, b/l hips in future.  RTC in 3 months for f/u.      INSPECT REPORT     As part of the patient's treatment plan, I am prescribing controlled substances. The patient has been made aware of appropriate use of such medications, including potential risk of somnolence, limited ability to drive and/or work safely, and the potential for dependence or overdose. It has also bee made clear that these medications are for use by this patient only, without concomitant use of alcohol or other substances unless prescribed.      Patient has completed prescribing agreement detailing terms of continued prescribing of controlled substances, including monitoring INSPECT reports, urine drug screening, and pill counts if necessary. The patient is aware that inappropriate use will results in cessation of prescribing such medications.     INSPECT report has been reviewed and scanned into the patient's chart.     As the clinician, I personally reviewed the INSPECT while the patient was in the office today.     History and physical exam exhibit continued safe and appropriate use of controlled substances.

## 2023-11-20 ENCOUNTER — TELEPHONE (OUTPATIENT)
Dept: FAMILY MEDICINE CLINIC | Facility: CLINIC | Age: 63
End: 2023-11-20

## 2023-11-20 DIAGNOSIS — M79.605 PAIN IN BOTH LOWER EXTREMITIES: ICD-10-CM

## 2023-11-20 DIAGNOSIS — I10 PRIMARY HYPERTENSION: ICD-10-CM

## 2023-11-20 DIAGNOSIS — G89.29 CHRONIC MIDLINE LOW BACK PAIN WITH LEFT-SIDED SCIATICA: ICD-10-CM

## 2023-11-20 DIAGNOSIS — M79.604 PAIN IN BOTH LOWER EXTREMITIES: ICD-10-CM

## 2023-11-20 DIAGNOSIS — M54.42 CHRONIC MIDLINE LOW BACK PAIN WITH LEFT-SIDED SCIATICA: ICD-10-CM

## 2023-11-20 RX ORDER — MELOXICAM 15 MG/1
15 TABLET ORAL DAILY
Qty: 90 TABLET | Refills: 1 | Status: SHIPPED | OUTPATIENT
Start: 2023-11-20

## 2023-11-20 RX ORDER — LOSARTAN POTASSIUM AND HYDROCHLOROTHIAZIDE 25; 100 MG/1; MG/1
1 TABLET ORAL DAILY
Qty: 90 TABLET | Refills: 1 | Status: SHIPPED | OUTPATIENT
Start: 2023-11-20

## 2023-11-20 NOTE — TELEPHONE ENCOUNTER
Hub staff attempted to follow warm transfer process and was unsuccessful     Caller: TIANA VALERIO    Relationship to patient: Emergency Contact    Best call back number: [phone]     Patient is needing:   PATIENT'S SPOUSE IS CALLING TO SCHEDULE A RSV SHOT FOR THE PATIENT

## 2023-11-25 RX ORDER — OMEPRAZOLE 40 MG/1
CAPSULE, DELAYED RELEASE ORAL
Qty: 90 CAPSULE | Refills: 3 | Status: SHIPPED | OUTPATIENT
Start: 2023-11-25

## 2024-01-02 RX ORDER — ATORVASTATIN CALCIUM 20 MG/1
20 TABLET, FILM COATED ORAL DAILY
Qty: 90 TABLET | Refills: 0 | Status: SHIPPED | OUTPATIENT
Start: 2024-01-02

## 2024-02-15 ENCOUNTER — OFFICE VISIT (OUTPATIENT)
Dept: PAIN MEDICINE | Facility: CLINIC | Age: 64
End: 2024-02-15
Payer: COMMERCIAL

## 2024-02-15 VITALS
WEIGHT: 307.9 LBS | RESPIRATION RATE: 16 BRPM | OXYGEN SATURATION: 92 % | HEART RATE: 77 BPM | SYSTOLIC BLOOD PRESSURE: 125 MMHG | DIASTOLIC BLOOD PRESSURE: 74 MMHG | BODY MASS INDEX: 42.94 KG/M2

## 2024-02-15 DIAGNOSIS — M25.512 CHRONIC PAIN OF BOTH SHOULDERS: ICD-10-CM

## 2024-02-15 DIAGNOSIS — M25.552 BILATERAL HIP PAIN: ICD-10-CM

## 2024-02-15 DIAGNOSIS — G89.29 CHRONIC PAIN OF BOTH SHOULDERS: ICD-10-CM

## 2024-02-15 DIAGNOSIS — G89.29 CHRONIC MIDLINE LOW BACK PAIN WITHOUT SCIATICA: Primary | ICD-10-CM

## 2024-02-15 DIAGNOSIS — M12.811 ROTATOR CUFF ARTHROPATHY OF BOTH SHOULDERS: ICD-10-CM

## 2024-02-15 DIAGNOSIS — M25.562 CHRONIC PAIN OF LEFT KNEE: ICD-10-CM

## 2024-02-15 DIAGNOSIS — M25.511 CHRONIC PAIN OF BOTH SHOULDERS: ICD-10-CM

## 2024-02-15 DIAGNOSIS — M54.50 CHRONIC MIDLINE LOW BACK PAIN WITHOUT SCIATICA: Primary | ICD-10-CM

## 2024-02-15 DIAGNOSIS — M12.812 ROTATOR CUFF ARTHROPATHY OF BOTH SHOULDERS: ICD-10-CM

## 2024-02-15 DIAGNOSIS — M47.816 SPONDYLOSIS OF LUMBAR REGION WITHOUT MYELOPATHY OR RADICULOPATHY: ICD-10-CM

## 2024-02-15 DIAGNOSIS — M46.1 SACROILIITIS: ICD-10-CM

## 2024-02-15 DIAGNOSIS — M25.551 BILATERAL HIP PAIN: ICD-10-CM

## 2024-02-15 DIAGNOSIS — G89.29 CHRONIC PAIN OF LEFT KNEE: ICD-10-CM

## 2024-02-15 RX ORDER — HYDROCODONE BITARTRATE AND ACETAMINOPHEN 10; 325 MG/1; MG/1
1 TABLET ORAL EVERY 6 HOURS PRN
Qty: 120 TABLET | Refills: 0 | Status: SHIPPED | OUTPATIENT
Start: 2024-02-15

## 2024-04-26 ENCOUNTER — OFFICE VISIT (OUTPATIENT)
Dept: FAMILY MEDICINE CLINIC | Facility: CLINIC | Age: 64
End: 2024-04-26
Payer: COMMERCIAL

## 2024-04-26 VITALS
BODY MASS INDEX: 43.37 KG/M2 | DIASTOLIC BLOOD PRESSURE: 80 MMHG | RESPIRATION RATE: 18 BRPM | HEIGHT: 71 IN | HEART RATE: 78 BPM | SYSTOLIC BLOOD PRESSURE: 126 MMHG | OXYGEN SATURATION: 96 % | WEIGHT: 309.8 LBS

## 2024-04-26 DIAGNOSIS — I48.0 PAROXYSMAL ATRIAL FIBRILLATION: Primary | ICD-10-CM

## 2024-04-26 PROCEDURE — 99214 OFFICE O/P EST MOD 30 MIN: CPT | Performed by: STUDENT IN AN ORGANIZED HEALTH CARE EDUCATION/TRAINING PROGRAM

## 2024-04-26 RX ORDER — GABAPENTIN 300 MG/1
300 CAPSULE ORAL 2 TIMES DAILY
COMMUNITY

## 2024-04-26 NOTE — PROGRESS NOTES
"Chief Complaint  Chief Complaint   Patient presents with    Hypertension     Would like a referral to cardiologist        Subjective        Chi Ramirez II is a 63 y.o. male who presents to [facility].    History of Present Illness  On 2/24/24 pt had knee replacement at [facility]. In recovery, he went into A-fib. Pt states rate was averaging 95 BPM. Pt states he was given medication in IV, thinks it was Amiodarone, but not sent home with a prescription for this, any other anti-arrhythmic, or anticoagulant. Dr Aguero was consulted for management in hospital. Records are not available for review. He says Dr Aguero has contacted him about a possible ablation, but he would prefer to see a [facility] [place] cardiologist. Pt states he plans to have other knee replaced this year, and wants to ensure he would have cardiac clearance to pursue that. Pt denies shortness of air, palpitations, swelling, exercise intolerance, or dizziness.     Objective   /80   Pulse 78   Resp 18   Ht 180.3 cm (70.98\")   Wt (!) 141 kg (309 lb 12.8 oz)   SpO2 96%   BMI 43.23 kg/m²     Estimated body mass index is 43.23 kg/m² as calculated from the following:    Height as of this encounter: 180.3 cm (70.98\").    Weight as of this encounter: 141 kg (309 lb 12.8 oz).     Physical Exam   GEN: In no acute distress, non toxic appearing  CV: Regular rate and rhythm, no murmurs, 2+ peripheral pulses, No extremity edema.   RESP: Lungs clear to auscultation anteriorly and posteriorly in all lung fields bilaterally..  SKIN: surgical scar noted right knee  NEURO: AAO to person, place, and time. CN 2-12 intact grossly.   PSYCH: Affect normal, insight fair     Result Review :              Assessment and Plan     Diagnoses and all orders for this visit:    1. Paroxysmal atrial fibrillation (Primary)  Flipped into a fib during / right after knee replacement surgery.  Now cardiologist is recommending " ablation.  Heart is RRR on exam today.  We will obtain holter monitor x 14 days to assess for episodes of a fib.  We will have him see local cardiologist, per his request, to discuss the above scenario and see if there is anything additional he needs to do prior to next surgery to make sure no additional arrhythmias occur.  -     Ambulatory Referral to Cardiology  -     Holter Monitor - 72 Hour Up To 15 Days; Future      Follow Up   With cardiology following holter monitor results

## 2024-05-07 ENCOUNTER — HOSPITAL ENCOUNTER (OUTPATIENT)
Dept: RESPIRATORY THERAPY | Facility: HOSPITAL | Age: 64
Discharge: HOME | End: 2024-05-07
Admitting: STUDENT IN AN ORGANIZED HEALTH CARE EDUCATION/TRAINING PROGRAM
Payer: COMMERCIAL

## 2024-05-07 DIAGNOSIS — I48.0 PAROXYSMAL ATRIAL FIBRILLATION: ICD-10-CM

## 2024-05-07 PROCEDURE — 93246 EXT ECG>7D<15D RECORDING: CPT

## 2024-05-16 ENCOUNTER — OFFICE VISIT (OUTPATIENT)
Dept: PAIN MEDICINE | Facility: CLINIC | Age: 64
End: 2024-05-16
Payer: COMMERCIAL

## 2024-05-16 VITALS
DIASTOLIC BLOOD PRESSURE: 82 MMHG | SYSTOLIC BLOOD PRESSURE: 127 MMHG | WEIGHT: 312.5 LBS | HEART RATE: 65 BPM | BODY MASS INDEX: 43.6 KG/M2 | RESPIRATION RATE: 16 BRPM | OXYGEN SATURATION: 92 %

## 2024-05-16 DIAGNOSIS — M25.551 BILATERAL HIP PAIN: ICD-10-CM

## 2024-05-16 DIAGNOSIS — M47.816 SPONDYLOSIS OF LUMBAR REGION WITHOUT MYELOPATHY OR RADICULOPATHY: ICD-10-CM

## 2024-05-16 DIAGNOSIS — M46.1 SACROILIITIS: ICD-10-CM

## 2024-05-16 DIAGNOSIS — G89.29 CHRONIC MIDLINE LOW BACK PAIN WITHOUT SCIATICA: Primary | ICD-10-CM

## 2024-05-16 DIAGNOSIS — M25.552 BILATERAL HIP PAIN: ICD-10-CM

## 2024-05-16 DIAGNOSIS — M12.811 ROTATOR CUFF ARTHROPATHY OF BOTH SHOULDERS: ICD-10-CM

## 2024-05-16 DIAGNOSIS — M25.562 CHRONIC PAIN OF LEFT KNEE: ICD-10-CM

## 2024-05-16 DIAGNOSIS — M79.604 PAIN IN BOTH LOWER EXTREMITIES: ICD-10-CM

## 2024-05-16 DIAGNOSIS — M12.812 ROTATOR CUFF ARTHROPATHY OF BOTH SHOULDERS: ICD-10-CM

## 2024-05-16 DIAGNOSIS — G89.29 CHRONIC PAIN OF LEFT KNEE: ICD-10-CM

## 2024-05-16 DIAGNOSIS — G89.29 CHRONIC PAIN OF BOTH SHOULDERS: ICD-10-CM

## 2024-05-16 DIAGNOSIS — M25.512 CHRONIC PAIN OF BOTH SHOULDERS: ICD-10-CM

## 2024-05-16 DIAGNOSIS — M54.50 CHRONIC MIDLINE LOW BACK PAIN WITHOUT SCIATICA: Primary | ICD-10-CM

## 2024-05-16 DIAGNOSIS — M79.605 PAIN IN BOTH LOWER EXTREMITIES: ICD-10-CM

## 2024-05-16 DIAGNOSIS — M25.511 CHRONIC PAIN OF BOTH SHOULDERS: ICD-10-CM

## 2024-05-16 RX ORDER — HYDROCODONE BITARTRATE AND ACETAMINOPHEN 10; 325 MG/1; MG/1
1 TABLET ORAL EVERY 6 HOURS PRN
Qty: 120 TABLET | Refills: 0 | Status: SHIPPED | OUTPATIENT
Start: 2024-05-16

## 2024-05-16 NOTE — PROGRESS NOTES
Subjective   Chi Ramirez II is a 63 y.o. male.     History of Present Illness  Chronic low back pain, nonradiating, also left knee pain and b/l hip pain, 6/10 at worst, 3/10 at best, always present, varies, began 2018 with workplace injury, burning, tingling, worse with standing and walking, interferes with ADLs, sleep, failed PT. 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 Saw PCP, notes reviewed, as above, with referral for pain management, stable on Norco 10mg TID prn, no red flags notes. No FH of substance abuse. Increased to Norco 10mg QID prn with better relief. Worsening pain over b/l SIJ radiating to front, worse with standing after sitting, climbing down from truck. Also worsening pain over right CMC joint. Had acute pain in R  knee, went to ED, seen by Ortho, had knee drained and injected with steroid with some relief, upcoming f/u appt to review MRI.  Back Pain  Pertinent negatives include no abdominal pain, bladder incontinence, chest pain, fever, numbness or weakness.        The following portions of the patient's history were reviewed and updated as appropriate: allergies, current medications, past family history, past medical history, past social history, past surgical history and problem list.    Review of Systems   Constitutional:  Positive for fatigue. Negative for chills and fever.   HENT:  Positive for hearing loss. Negative for trouble swallowing.    Eyes:  Negative for visual disturbance.   Respiratory:  Positive for shortness of breath.    Cardiovascular:  Negative for chest pain.   Gastrointestinal:  Positive for constipation. Negative for abdominal pain, diarrhea, nausea and vomiting.   Genitourinary:  Negative for urinary incontinence.   Musculoskeletal:  Positive for arthralgias, back pain and joint swelling. Negative for myalgias and neck pain.   Neurological:  Positive for headache. Negative for dizziness, weakness and numbness.       Objective   Physical  Exam  Constitutional:       Appearance: Normal appearance. He is well-developed.   HENT:      Head: Normocephalic and atraumatic.   Eyes:      Extraocular Movements: Extraocular movements intact.      Pupils: Pupils are equal, round, and reactive to light.   Cardiovascular:      Rate and Rhythm: Normal rate and regular rhythm.      Heart sounds: Normal heart sounds.   Pulmonary:      Effort: Pulmonary effort is normal.      Breath sounds: Normal breath sounds.   Abdominal:      General: Bowel sounds are normal. There is no distension.      Palpations: Abdomen is soft.      Tenderness: There is no abdominal tenderness.   Musculoskeletal:      Cervical back: Normal range of motion.      Comments: Pain in b/l groin with FADIR  B/l shoulder: (+) empty can test R>>L   Neurological:      Mental Status: He is alert and oriented to person, place, and time.      Sensory: No sensory deficit.      Deep Tendon Reflexes: Reflexes are normal and symmetric.   Psychiatric:         Mood and Affect: Mood normal.         Behavior: Behavior normal.         Thought Content: Thought content normal.         Judgment: Judgment normal.           Assessment & Plan   Diagnoses and all orders for this visit:    1. Chronic midline low back pain without sciatica (Primary)    2. Bilateral hip pain    3. Chronic pain of both shoulders    4. Chronic pain of left knee    5. Pain in both lower extremities    6. Rotator cuff arthropathy of both shoulders    7. Sacroiliitis    8. Spondylosis of lumbar region without myelopathy or radiculopathy      UDS in order 11/16/23.  Discussed risks and benefits of opioid treatment for chonic pain with patient, including expectations related to prescription requests, alternative modalities to opioids for managing pain, her treatment plan, risks of dependency and addiction, and safe storage practices for prescribed opioids, as well as proper and improper disposal of all medications.  Treatment plan will consist of  continuing current medication as long as it remains effective and is necessary, while evaluating patient at each visit and determining if the medication can be lowered or discontinued, while also using nonopioid therapies to reduce reliance on opioids.  Increased to Norco 10mg QID prn, doing well now. Filled 5/1/24.   Constipation controlled with OTC stool softener.  Discussed MBB and RFA of b/l L3-5 facet joints in depth, he will consider it.  Performed b/l SIJ injections with good relief.  Performed b/l shoulder subacromial injections with same-day relief only, will not repeat, declines referral for surgical eval at this time.  May inject L knee, b/l hips in future. Had R TKA, plans L TKA.  RTC in 3 months for f/u.      INSPECT REPORT     As part of the patient's treatment plan, I am prescribing controlled substances. The patient has been made aware of appropriate use of such medications, including potential risk of somnolence, limited ability to drive and/or work safely, and the potential for dependence or overdose. It has also bee made clear that these medications are for use by this patient only, without concomitant use of alcohol or other substances unless prescribed.      Patient has completed prescribing agreement detailing terms of continued prescribing of controlled substances, including monitoring INSPECT reports, urine drug screening, and pill counts if necessary. The patient is aware that inappropriate use will results in cessation of prescribing such medications.     INSPECT report has been reviewed and scanned into the patient's chart.     As the clinician, I personally reviewed the INSPECT while the patient was in the office today.     History and physical exam exhibit continued safe and appropriate use of controlled substances.

## 2024-05-23 ENCOUNTER — OFFICE VISIT (OUTPATIENT)
Dept: CARDIOLOGY | Facility: CLINIC | Age: 64
End: 2024-05-23
Payer: COMMERCIAL

## 2024-05-23 VITALS
DIASTOLIC BLOOD PRESSURE: 88 MMHG | WEIGHT: 311 LBS | HEART RATE: 62 BPM | SYSTOLIC BLOOD PRESSURE: 133 MMHG | BODY MASS INDEX: 43.54 KG/M2 | RESPIRATION RATE: 18 BRPM | HEIGHT: 71 IN

## 2024-05-23 DIAGNOSIS — I48.0 AF (PAROXYSMAL ATRIAL FIBRILLATION): Primary | ICD-10-CM

## 2024-05-23 PROCEDURE — 93000 ELECTROCARDIOGRAM COMPLETE: CPT | Performed by: INTERNAL MEDICINE

## 2024-05-23 PROCEDURE — 99204 OFFICE O/P NEW MOD 45 MIN: CPT | Performed by: INTERNAL MEDICINE

## 2024-05-23 RX ORDER — FUROSEMIDE 80 MG
80 TABLET ORAL DAILY
Qty: 30 TABLET | Refills: 5 | Status: SHIPPED | OUTPATIENT
Start: 2024-05-23

## 2024-05-24 NOTE — PROGRESS NOTES
Cardiology Clinic Note  Darwin Darnell MD, PhD    Subjective:     Encounter Date:05/23/2024      Patient ID: Chi Ramirez II is a 63 y.o. male.    Chief Complaint:  Chief Complaint   Patient presents with    Atrial Fibrillation       HPI:    I had the pleasure to see this 63-year-old gentleman today as a new patient in clinic with history of essential hypertension hyperlipidemia morbid obesity with BMI greater than 40, history of perioperative atrial fibrillation needing further knee surgery.  Reportedly had an episode of perioperative atrial fibrillation with a prior knee surgery was sent for evaluation as he needs the other knee done.  He is largely sedentary, does complain of a significant degree of shortness of breath and dyspnea on exertion.  He does have significant swelling in his lower extremities.  We have talked about his overall health today extensively with poor exertional capacity, dyspnea on exertion, essential hypertension hyperlipidemia, risk factors for sleep apnea, he is a chronic smoker of 1 and half packs per day for many years with abnormal PFTs even back to 2019 demonstrating obstructive disease obstructive physiology consistent with COPD.  He was diabetes on metformin and now Jardiance as well as a comorbidity.  We discussed risk factor modification, need for diet exercise and weight loss, he recently had echo at [facility] in [place] which is unavailable at this time.  Review of systems otherwise negative x 14 point review of systems except as mentioned above    EKG reviewed interpreted by me demonstrates sinus rhythm, left anterior fascicular block, right bundle branch block       Historical data copied forward from previous encounters in EMR including the history, exam, and assessment/plan has been reviewed and is unchanged unless noted otherwise.    Cardiac medicines reviewed with risk, benefits, and necessity of each discussed.    Risk and benefit of cardiac testing reviewed  "including death heart attack stroke pain bleeding infection need for vascular /cardiovascular surgery were discussed and the patient     Objective:         /88 (BP Location: Right arm, Patient Position: Sitting)   Pulse 62   Resp 18   Ht 180.3 cm (71\")   Wt (!) 141 kg (311 lb)   BMI 43.38 kg/m²     Physical Exam  regular rate and rhythm with no rubs gallops heave or lift, 1 out of 6 systolic ejection murmur low sternal border  Morbid obesity, soft nontender  Intact grossly  No bony abnormalities  To 3+ pitting edema lower extremities below the knees  Normal cap refill normal radial pulses  No carotid bruits, positive JVD HJR  Mildly delayed expiratory phase no gross wheezing  No rhonchi    Assessment:       Independently manage medical conditions    Perioperative atrial fibrillation paroxysmal he  Essential hypertension  Hyperlipidemia  Risk factors for CAD  Dyspnea on exertion  Peripheral edema  Fatigue and daytime tiredness concern for sleep apnea  Preventative health care      Diagnoses and all orders for this visit:    1. AF (paroxysmal atrial fibrillation) (Primary)  -     CT Cardiac Calcium Score Without Dye; Future  -     Ambulatory Referral to Sleep Medicine  -     Comprehensive Metabolic Panel; Future  -     Magnesium; Future  -     Urinalysis With Microscopic - Urine, Clean Catch; Future    Other orders  -     furosemide (Lasix) 80 MG tablet; Take 1 tablet by mouth Daily.  Dispense: 30 tablet; Refill: 5  -     empagliflozin (Jardiance) 10 MG tablet tablet; Take 1 tablet by mouth Daily.  Dispense: 30 tablet; Refill: 3    2D echo for structural and functional evaluation from [facility] we requested, we will not order repeat at this time  Increase Lasix to 80 daily with volume overload evident on exam today with JVD as well as peripheral edema  Jardiance 10 mg daily  Order sleep study  CMP magnesium and UA in 10 days after starting and increasing Lasix and Jardiance  Coronary calcium scoring " for risk assessment of CAD  Refer to bariatrics if needed, diet exercise and weight loss are essential for this patient moving forward    Perioperative evaluation  Has not had ischemic evaluation  Paroxysmal atrial fibrillation but presently in sinus rhythm with conduction abnormalities with left intrafascicular block and right bundle branch block with sinus rhythm, heart monitor with sinus rhythm only  Would not warrant antiarrhythmics at this time  Smoking cessation counseling discussed extensively today    See him back in 30 days for optimization    Darwin Darnell MD, PhD      The pleasure to be involved in this patient's cardiovascular care.  Please call with any questions or concerns  Darwin Darnell MD, PhD    Most recent EKG as reviewed and interpreted by me:    ECG 12 Lead    Date/Time: 5/23/2024 8:05 AM  Performed by: Darwin Darnell MD    Authorized by: Darwin Darnell MD  Comparison: not compared with previous ECG   Previous ECG: no previous ECG available  Rhythm: sinus rhythm  Rate: normal  Conduction: right bundle branch block and left anterior fascicular block    Clinical impression: abnormal EKG           Most recent echo as reviewed and interpreted by me:      Most recent stress test as reviewed and interpreted by me:      Most recent cardiac catheterization as reviewed interpreted by me:  No results found for this or any previous visit.    The following portions of the patient's history were reviewed and updated as appropriate: allergies, current medications, past family history, past medical history, past social history, past surgical history, and problem list.      ROS:  14 point review of systems negative except as mentioned above    Current Outpatient Medications:     amLODIPine (NORVASC) 2.5 MG tablet, TAKE ONE TABLET BY MOUTH DAILY, Disp: 90 tablet, Rfl: 0    aspirin 81 MG EC tablet, Take 1 tablet by mouth Daily., Disp: , Rfl:     atorvastatin (LIPITOR) 20 MG tablet, TAKE 1  TABLET BY MOUTH DAILY, Disp: 90 tablet, Rfl: 0    docusate sodium (COLACE) 100 MG capsule, Take 7 capsules by mouth Every Morning., Disp: , Rfl:     gabapentin (NEURONTIN) 300 MG capsule, Take 1 capsule by mouth 2 (Two) Times a Day., Disp: , Rfl:     HYDROcodone-acetaminophen (Norco)  MG per tablet, Take 1 tablet by mouth Every 6 (Six) Hours As Needed for Moderate Pain., Disp: 120 tablet, Rfl: 0    HYDROcodone-acetaminophen (Norco)  MG per tablet, Take 1 tablet by mouth Every 6 (Six) Hours As Needed for Moderate Pain., Disp: 120 tablet, Rfl: 0    HYDROcodone-acetaminophen (Norco)  MG per tablet, Take 1 tablet by mouth Every 6 (Six) Hours As Needed for Moderate Pain., Disp: 120 tablet, Rfl: 0    losartan-hydrochlorothiazide (HYZAAR) 100-25 MG per tablet, TAKE 1 TABLET BY MOUTH DAILY, Disp: 90 tablet, Rfl: 1    meloxicam (MOBIC) 15 MG tablet, TAKE ONE TABLET BY MOUTH DAILY, Disp: 90 tablet, Rfl: 1    metFORMIN ER (GLUCOPHAGE-XR) 500 MG 24 hr tablet, Take 1 tablet by mouth Daily With Dinner., Disp: 90 tablet, Rfl: 1    multivitamin with minerals tablet tablet, Take 1 tablet by mouth Daily., Disp: , Rfl:     naproxen sodium (ALEVE) 220 MG tablet, Take 3 tablets by mouth Every Morning., Disp: , Rfl:     omeprazole (priLOSEC) 40 MG capsule, TAKE ONE CAPSULE BY MOUTH DAILY, Disp: 90 capsule, Rfl: 3    empagliflozin (Jardiance) 10 MG tablet tablet, Take 1 tablet by mouth Daily., Disp: 30 tablet, Rfl: 3    furosemide (Lasix) 80 MG tablet, Take 1 tablet by mouth Daily., Disp: 30 tablet, Rfl: 5    Problem List:  Patient Active Problem List   Diagnosis    Fatigue    Genital herpes    Hypersomnia    Hypertension    Hypogonadism    Pain of lower extremity    Obesity    Polyosteoarthritis    Tobacco use disorder    Vitamin D deficiency    Need for assessment for sleep apnea    Pure hypercholesterolemia    Chronic midline low back pain without sciatica    Chronic pain of left knee    Bilateral hip pain     Spondylosis of lumbar region without myelopathy or radiculopathy    Sacroiliitis    Prediabetes    Hypercapnic respiratory failure    Chronic pain of both shoulders    Rotator cuff arthropathy of both shoulders    COVID-19    Acute exacerbation of chronic obstructive pulmonary disease (COPD)    Cytokine release syndrome, grade 3     Past Medical History:  Past Medical History:   Diagnosis Date    Allergic rhinitis     Amputation, toe, traumatic with complication     Multiple toes    Asthma     Chronic leg pain     Chronic low back pain     Condyloma acuminata     COPD (chronic obstructive pulmonary disease)     Did not respond to inhalers    COVID     10/2022    Erectile dysfunction     Genital herpes     Hearing loss     Hyperlipidemia     Hypersomnia     Felt secondary to sleep apnea which patient will not get tested    Hypertension     Kidney stone     Myalgia     Obesity     Osteoarthritis     Rib fractures     Vitamin D deficiency      Past Surgical History:  Past Surgical History:   Procedure Laterality Date    AMPUTATION FOOT / TOE Left     4 toes     LIPOMA EXCISION      OTHER SURGICAL HISTORY      LT KNEE MENISCAL TEAR    OTHER SURGICAL HISTORY Left     SHOULDER ROTATOR CUFF    VASECTOMY       Social History:  Social History     Socioeconomic History    Marital status:    Tobacco Use    Smoking status: Every Day     Current packs/day: 1.50     Average packs/day: 1.5 packs/day for 25.0 years (37.5 ttl pk-yrs)     Types: Cigarettes    Smokeless tobacco: Never    Tobacco comments:     stop smoking   Vaping Use    Vaping status: Never Used   Substance and Sexual Activity    Alcohol use: Yes     Comment: rare    Drug use: Never    Sexual activity: Not Currently     Allergies:  No Known Allergies  Immunizations:  Immunization History   Administered Date(s) Administered    COVID-19 (PFIZER) Purple Cap Monovalent 03/23/2021, 04/06/2021    Fluzone (or Fluarix & Flulaval for VFC) >6mos 10/02/2020    Influenza  Quad Vaccine (Inpatient) 11/27/2017    Pneumococcal Conjugate 13-Valent (PCV13) 01/15/2015    Pneumococcal Polysaccharide (PPSV23) 06/14/2019    Shingrix 06/14/2019            In-Office Procedure(s):  No orders to display        ASCVD RIsk Score::  The 10-year ASCVD risk score (Charu FERGUSON, et al., 2019) is: 36.9%    Values used to calculate the score:      Age: 63 years      Sex: Male      Is Non- : No      Diabetic: Yes      Tobacco smoker: Yes      Systolic Blood Pressure: 133 mmHg      Is BP treated: Yes      HDL Cholesterol: 40 mg/dL      Total Cholesterol: 187 mg/dL    Imaging:    Results for orders placed during the hospital encounter of 08/14/23    XR Knee 3 View Right    Narrative  XR KNEE 3 VW RIGHT    Date of Exam: 8/14/2023 11:47 AM EDT    Indication: knee pain    Comparison: None available.    Findings:  No acute fracture or dislocation is identified. There is severe tricompartmental degenerative changes with osteophytosis and joint space narrowing. This is most prominent in the patellofemoral compartment. There are enthesopathic changes at the insertion  of the quadriceps tendon. There is a trace knee effusion. Bone mineralization is decreased. No aggressive appearing lytic or sclerotic bone lesions.    Impression  Impression:    1. Moderate tricompartmental degenerative changes.      Electronically Signed: Aguila Lassiter MD  8/14/2023 12:14 PM EDT  Workstation ID: [id number]       Results for orders placed during the hospital encounter of 03/11/22    CT Chest Low Dose Cancer Screening WO    Narrative  CT CHEST LOW DOSE CANCER SCREENING WO-    Date of Exam: 3/11/2022 4:22 PM    Indication: Lung cancer screening, >= 20 pk-yr smoking history, risk  factor(s) (Age >= 50y); Z72.0-Tobacco use    Comparison: None available.    Technique: Low dose CT imaging of the chest was performed without  intravenous contrast enhancement.  Automated exposure control and  iterative reconstruction  methods were used.    FINDINGS:    3 mm noncalcified nodule within the right lower lobe (series 5 image 86)  cysts. 3 mm noncalcified left apical lung nodule (image 21). 2 mm nodule  within the left lower lobe (image 100).    No acute airspace disease. No abnormal bronchial wall thickening or  bronchiectasis. No pathologically enlarged lymph nodes. Heart size is  normal. No pericardial effusion or pleural effusion. No significant  coronary artery calcifications.    Included upper abdominal organs have a normal noncontrast appearance.    Multiple old left rib fractures. No acute or suspicious osseous  abnormality. Thyroid gland is within normal limits.    Impression  1. LUNG RADS category 2. Benign. 3 mm and 2 mm noncalcified pulmonary  nodules. Continued annual screening with low-dose CT chest is  recommended, following the LUNG RADS protocol.          Electronically Signed By-Alcira Norton MD On:3/11/2022 4:53 PM  This report was finalized on [id number] by  Alcira Norton MD.      Results for orders placed during the hospital encounter of 03/11/22    CT Chest Low Dose Cancer Screening WO    Narrative  CT CHEST LOW DOSE CANCER SCREENING WO-    Date of Exam: 3/11/2022 4:22 PM    Indication: Lung cancer screening, >= 20 pk-yr smoking history, risk  factor(s) (Age >= 50y); Z72.0-Tobacco use    Comparison: None available.    Technique: Low dose CT imaging of the chest was performed without  intravenous contrast enhancement.  Automated exposure control and  iterative reconstruction methods were used.    FINDINGS:    3 mm noncalcified nodule within the right lower lobe (series 5 image 86)  cysts. 3 mm noncalcified left apical lung nodule (image 21). 2 mm nodule  within the left lower lobe (image 100).    No acute airspace disease. No abnormal bronchial wall thickening or  bronchiectasis. No pathologically enlarged lymph nodes. Heart size is  normal. No pericardial effusion or pleural effusion. No  significant  coronary artery calcifications.    Included upper abdominal organs have a normal noncontrast appearance.    Multiple old left rib fractures. No acute or suspicious osseous  abnormality. Thyroid gland is within normal limits.    Impression  1. LUNG RADS category 2. Benign. 3 mm and 2 mm noncalcified pulmonary  nodules. Continued annual screening with low-dose CT chest is  recommended, following the LUNG RADS protocol.          Electronically Signed By-Alcira Norton MD On:3/11/2022 4:53 PM  This report was finalized on [id number] by  Alcira Norton MD.      Lab Review:   No visits with results within 6 Month(s) from this visit.   Latest known visit with results is:   No results displayed because visit has over 200 results.        Recent labs reviewed and interpreted for clinical significance and application            Level of Care:           Darwin Darnell MD  05/24/24  .

## 2024-05-28 ENCOUNTER — PATIENT ROUNDING (BHMG ONLY) (OUTPATIENT)
Dept: CARDIOLOGY | Facility: CLINIC | Age: 64
End: 2024-05-28
Payer: COMMERCIAL

## 2024-06-05 ENCOUNTER — OFFICE VISIT (OUTPATIENT)
Dept: SLEEP MEDICINE | Facility: CLINIC | Age: 64
End: 2024-06-05
Payer: COMMERCIAL

## 2024-06-05 VITALS
HEART RATE: 71 BPM | DIASTOLIC BLOOD PRESSURE: 65 MMHG | OXYGEN SATURATION: 88 % | HEIGHT: 71 IN | WEIGHT: 311.2 LBS | SYSTOLIC BLOOD PRESSURE: 111 MMHG | BODY MASS INDEX: 43.57 KG/M2

## 2024-06-05 DIAGNOSIS — J96.92 RESPIRATORY FAILURE WITH HYPERCAPNIA, UNSPECIFIED CHRONICITY: ICD-10-CM

## 2024-06-05 DIAGNOSIS — G89.29 OTHER CHRONIC PAIN: ICD-10-CM

## 2024-06-05 DIAGNOSIS — G47.19 EXCESSIVE DAYTIME SLEEPINESS: ICD-10-CM

## 2024-06-05 DIAGNOSIS — Z79.891 LONG-TERM CURRENT USE OF OPIATE ANALGESIC: ICD-10-CM

## 2024-06-05 DIAGNOSIS — Z72.0 TOBACCO USER: ICD-10-CM

## 2024-06-05 DIAGNOSIS — Z72.821 INADEQUATE SLEEP HYGIENE: ICD-10-CM

## 2024-06-05 DIAGNOSIS — I10 ESSENTIAL HYPERTENSION: ICD-10-CM

## 2024-06-05 DIAGNOSIS — R06.81 WITNESSED EPISODE OF APNEA: ICD-10-CM

## 2024-06-05 DIAGNOSIS — J44.9 CHRONIC OBSTRUCTIVE PULMONARY DISEASE, UNSPECIFIED COPD TYPE: ICD-10-CM

## 2024-06-05 DIAGNOSIS — R06.83 SNORING: ICD-10-CM

## 2024-06-05 DIAGNOSIS — R29.818 SUSPECTED SLEEP APNEA: Primary | ICD-10-CM

## 2024-06-05 DIAGNOSIS — R09.02 HYPOXIA: ICD-10-CM

## 2024-06-05 PROCEDURE — G0463 HOSPITAL OUTPT CLINIC VISIT: HCPCS

## 2024-06-05 NOTE — PROGRESS NOTES
[facility]  [facility], [address]  [place] [address]  Phone   Fax     Chi Ramirez II  [id number]   1960  63 y.o.  male      Referring physician/provider Cardiology Dr. Darwin Darnell   PCP Darell Tello DO    Type of service: Initial Sleep Medicine Consult.  Date of service: 6/5/2024      Chief Complaint   Patient presents with    Witnessed Apnea    Snoring       History of present illness;  The patient was seen today on 6/5/2024 at [facility].    Thank you for asking to see Chi Ramirez II, 63 y.o. PMHx HTN, chronic back and lower extremity pain (on Norco q6hr), atrial fibrillation, COPD, Hypercapnic respiratory failure, polyosteoarthritis, sacroiliitis, spondylosis of lumbar region, diabetes, morbid obesity.   The patient presents for initial evaluation of sleep sleep disordered breathing.  Patient  denies prior surgery namely tonsillectomy, nasal surgery or UPPP.     Loud snoring and witnessed apneas >1 year  Never had sleep study    Obstructive Sleep Apnea Screening: STOP-BANG Sleep Apnea Questionnaire. Reference: Anders F et al. Br J Anaesth, 2012.     Criterion    Yes    No  Do you SNORE loudly?   [x]   Yes  []   No   Do you often feel TIRED, fatigued, or sleepy during the day?    [x]   Yes  []   No  Has anyone OBSERVED you stop breathing during your sleep?    [x]   Yes  []   No  Do you have or are you being treated for high blood PRESSURE?    [x]   Yes  []   No  BMI >32 kg/m2     [x]   Yes  []   No  AGE > 50 years    [x]   Yes  []   No  NECK circumference >16 inches / 40 cm    [x]   Yes  []   No  GENDER: male     [x]   Yes  []   No    GALEN Probability:  []   1-2 - Low  []   3-4 - Intermediate  [x]   5-8 - High      Diagnosed with COPD/Hypercapnic respiratory failure  States he doesn't have a pulmonologist   States he's not willing to go to the ED for evaluation   Endorses chronic dyspnea similar to which he has had in  the past 1 year nothing new nothing worse  Denies any chest-pain/wheezing/cough/hemoptysis/lower extremity edema/orthopnea/nausea/vomiting/diaphoresis  SPO2 on room air in sleep clinic 86-88% WA 70 BPM states I feel just fine  States he was recently diagnosed with A. Fib  Denies home O2    On hydrocodone q6hr >1 year for chronic pain     OTHER THAN what is noted in above HPI denies any other past known cardiopulmonary conditions/neurologic disorders/neuromuscular disorders  Never needed supplemental O2 at home  Denies any metal in head/neck/chest      Further Sleep History:    Bedtime: 830-9:30 PM  Rise Time: 7-8 AM  Sleep Latency: Less than 20  Screens in bed: Yes  Wake after sleep onset: Twice  Reasons for awakenings: Nocturia  Number of naps per day denies  Naps restorative: Not applicable  Caffeine use: 3 or more beverages per day    RLS Symptoms: No   Bruxism:No   Current sleep related gastroesophageal reflux symptoms:  No   Cataplexy:  No   Sleep Paralysis:  No   Hypnagogic or hypnopompic hallucinations: No   Parasomnias such as sleep walking or sleep eating No     Disclaimer Sleep History: The above sleep history is based on this sleep physician's in room encounter with the patient. Pre encounter self administered questionnaires are taken into consideration and discussed with patient for any discordance. The above documentation by this sleep physician is the most accurate clinical information determined by in room sleep physician encounter with patient.     MEDICAL CONDITIONS (PMH)   COPD  Hypercapnic respiratory failure  Chronic pain on long-term opiate therapy  A-fib  Polyarthritis  Sacroiliitis  Spondylosis of lumbar region  Diabetes  Morbid obesity    Social history:  Do you drive a commercial vehicle:  No   Shift work:  No   Tobacco use:  Yes current 1.5 PPD cigarette smoker for over 30  Alcohol use: 0 per week  Occupation: Not working    Family Hx (parents and siblings) (pertaining to sleep  "medicine)  Sleep apnea  HTN  Obesity    Medications: reviewed    Review of systems is negative unless otherwise noted per HPI   Disclaimer History: The above history is based on this sleep physician's in room encounter with the patient. Pre encounter self administered questionnaires are taken into consideration and discussed with patient for any discordance. The above documentation by this sleep physician is the most accurate clinical information determined by in room sleep physician encounter with patient.     Physical exam:  Vitals:    06/05/24 1300   BP: 111/65   BP Location: Left arm   Patient Position: Sitting   Pulse: 71   SpO2: (!) 88%   Weight: (!) 141 kg (311 lb 3.2 oz)   Height: 180.3 cm (71\")    Body mass index is 43.4 kg/m².   CONSTITUTIONAL:  Non-toxic, In no overt pain or respiratory distress  Head: normocephalic   ENT: Mallampati class IV, + macroglossia, no septal defects   NECK:Neck Circumference: 19 inches,no nuchal rigidity  RESPIRATORY SYSTEM: Breath sounds are diminished at bilateral bases (no rales, no rhonchi, no wheezes), no accessory muscle use  CARDIOVASULAR SYSTEM: Heart sounds are regular rhythm and normal rate, no rub, no gallop, trace lower extremity edema  NEUROLOGICAL SYSTEM: Oriented x 3, No gross focal deficits   PSYCHIATRIC SYSTEM: Goal oriented, affect full range appropriate      Office notes from care team reviewed:      -5/23/2024 office visit cardiology Dr. Darwin Darnell concern for sleep apnea      Labs reviewed.     -2/8/2024  Bicarb 41.0    Imaging/Diagnostics reviewed:     - 6/27/2019 PFTs inteperting physician Dr. Shivam Mixon:   showed moderate obstructive defect with hyperinflation.  Negative bronchodilator response.  Diffusion capacity is mildly reduced uncorrected it is normal when corrected for alveolar volume.     FEV1/FVC 65.  FEV1 is 2.37 which is 63% predicted FVC is 3.62 which is 73% predicted.  Total lung capacity is 7.68 which is 108% predicted with resident " volume of 3.3 which is 147% predicted.  Diffusion capacity 73% predicted and corrected and is 82% predicted when corrected for alveolar volume     Flow volume loop is consistent with an obstructive defect    Assessment and plan:  Suspected sleep apnea [R29.818] patient's symptoms and examination is consistent with sleep apnea (G47.30). I have talked to the patient about the signs and symptoms of sleep apnea. In addition, I have also discussed pathophysiology of sleep apnea.  I also discussed the complications of untreated sleep apnea including effects on hypertension, diabetes mellitus and nonrestorative sleep with hypersomnia which can increase risk for motor vehicle accidents.  Untreated sleep apnea is also a risk factor for development of atrial fibrillation, hypertension, insulin resistance and cerebrovascular accident.  Discussed in detail of various testing methods including home-based and lab based sleep studies.  Based on history and physical examination and other comorbidities the most appropriate study is to order SPLIT AHI > 15 in laboratory polysomnography, rather than home sleep apnea testing,to rule out diagnosis of sleep apnea per AASM clinical practice guidelines (doi:[email or web address]/jcsm.6506) secondary to patient’s history of  COPD/Hypercapnic respiratory failure/Hypoxia, long term opiate therapy.  Patient was given opportunity to ask questions and all the questions were answered.   Snoring (R06.83), snoring is the sound created by turbulent airflow vibrating upper airway soft tissue due to limitation of inspiratory airflow. I have also discussed factors affecting snoring including sleep deprivation, sleeping on the back and alcohol ingestion. To minimize snoring, patient is advised to have adequate sleep, sleep on the side and avoid alcohol and sedative medications before bedtime  Excessive daytime sleepiness .  Patient endorses subjective excessive daytime sleepiness with sleep physician encounter which  was consistent with patient's pre-encounter self-administered [place] Sleepiness Scale of Total score: 12.  There are many causes for daytime excessive sleepiness including depression, shiftwork syndrome, and other medical disorders including heart, kidney and liver failure.  From sleep disorders perspective this is sleep disordered breathing until proven otherwise. The most common cause of excessive sleepiness is due to sleep apnea with frequent awakenings during sleep time.  I have discussed safety of driving and to remain vigilant while driving; patient verbalized understanding of counseling.  Obesity, patient's BMI is Body mass index is 43.4 kg/m².. I have discussed the relationship between weight and sleep apnea.There is direct correlation between weight and severity of sleep apnea.  Weight reduction is encouraged, as it is going to reduce the severity of sleep apnea. I have also discussed with the patient diet and exercise to achieve ideal body weight.  Inadequate sleep hygiene [Z72.821]  Counseled the patient lifestyle modifications as below to be applied especially night of any sleep study, the patient verbalized understanding of same. Follow up with PCP to reinforce my counseling towards healthy lifestyle modifications if sleep studies are negative.  COPD/Hyper capneic Respiratory Failure/Hypoxia, Counseled to follow up with PCP after this visit. in lab sleep study as stated above. Counseled to follow up with PCP to ensure he has care with pulmonology.  Chronic pain/Long term opiate therapy, In lab sleep study as stated above. Follow up with prescribing physicain to discuss risks/benefits of opiate therapy.  HTN,  Follow up with primary care physician for continued management. This medical condition would make the patient eligible for a trial of PAP therapy even if sleep study reveals mild severity sleep apnea.  Tobacco user, Counseled cessation. Counseled tobacco use from a sleep disorders perspective is  associated with and not limited to: increased sleep latency,  shorter sleep duration, disrupted sleep architecture and worsening upper airway instability. Patient  appeared not to be receptive to counseling. Counseled follow up with PCP for further guidance and monitoring of tobacco cessation.        I have also discussed with the patient the following  Sleep hygiene: Maintaining a regular bedtime and wake time, not to watch television or work in bed, limit caffeine-containing beverages before bed time and avoid naps during the day  Adequate amount of sleep.  Generally most people needs about 7 to 8 hours of sleep.      Return for 31 to 90 days after PAP setup with down load.  Patient's questions were answered      I once again thank you for asking me to see this patient in consultation and I have forwarded my opinion and treatment plan.  Please do not hesitate to call me if you have any questions.       EMR Dragon/Transcription disclaimer:   Much of this encounter note is an electronic transcription/translation of spoken language to printed text. The electronic translation of spoken language may permit erroneous, or at times, nonsensical words or phrases to be inadvertently transcribed; Although I have reviewed the note for such errors, some may still exist.     NPI #: [id number]    Dana Leonardo, DO  Sleep Medicine  [facility]  06/05/24

## 2024-06-13 ENCOUNTER — LAB (OUTPATIENT)
Dept: LAB | Facility: HOSPITAL | Age: 64
End: 2024-06-13
Payer: COMMERCIAL

## 2024-06-13 DIAGNOSIS — I48.0 AF (PAROXYSMAL ATRIAL FIBRILLATION): ICD-10-CM

## 2024-06-13 DIAGNOSIS — I10 PRIMARY HYPERTENSION: ICD-10-CM

## 2024-06-13 LAB
ALBUMIN SERPL-MCNC: 4.2 G/DL (ref 3.5–5.2)
ALBUMIN/GLOB SERPL: 1.7 G/DL
ALP SERPL-CCNC: 102 U/L (ref 39–117)
ALT SERPL W P-5'-P-CCNC: 16 U/L (ref 1–41)
ANION GAP SERPL CALCULATED.3IONS-SCNC: 8.4 MMOL/L (ref 5–15)
AST SERPL-CCNC: 23 U/L (ref 1–40)
BILIRUB SERPL-MCNC: 0.3 MG/DL (ref 0–1.2)
BILIRUB UR QL STRIP: NEGATIVE
BUN SERPL-MCNC: 21 MG/DL (ref 8–23)
BUN/CREAT SERPL: 24.1 (ref 7–25)
CALCIUM SPEC-SCNC: 9.7 MG/DL (ref 8.6–10.5)
CHLORIDE SERPL-SCNC: 102 MMOL/L (ref 98–107)
CLARITY UR: CLEAR
CO2 SERPL-SCNC: 32.6 MMOL/L (ref 22–29)
COLOR UR: YELLOW
CREAT SERPL-MCNC: 0.87 MG/DL (ref 0.76–1.27)
EGFRCR SERPLBLD CKD-EPI 2021: 97 ML/MIN/1.73
GLOBULIN UR ELPH-MCNC: 2.5 GM/DL
GLUCOSE SERPL-MCNC: 90 MG/DL (ref 65–99)
GLUCOSE UR STRIP-MCNC: NEGATIVE MG/DL
HGB UR QL STRIP.AUTO: NEGATIVE
KETONES UR QL STRIP: ABNORMAL
LEUKOCYTE ESTERASE UR QL STRIP.AUTO: NEGATIVE
NITRITE UR QL STRIP: NEGATIVE
PH UR STRIP.AUTO: 7 [PH] (ref 5–8)
POTASSIUM SERPL-SCNC: 4.2 MMOL/L (ref 3.5–5.2)
PROT SERPL-MCNC: 6.7 G/DL (ref 6–8.5)
PROT UR QL STRIP: ABNORMAL
SODIUM SERPL-SCNC: 143 MMOL/L (ref 136–145)
SP GR UR STRIP: 1.03 (ref 1–1.03)
UROBILINOGEN UR QL STRIP: ABNORMAL

## 2024-06-13 PROCEDURE — 83735 ASSAY OF MAGNESIUM: CPT

## 2024-06-13 PROCEDURE — 81001 URINALYSIS AUTO W/SCOPE: CPT

## 2024-06-13 PROCEDURE — 80053 COMPREHEN METABOLIC PANEL: CPT

## 2024-06-13 PROCEDURE — 36415 COLL VENOUS BLD VENIPUNCTURE: CPT

## 2024-06-13 RX ORDER — AMLODIPINE BESYLATE 2.5 MG/1
2.5 TABLET ORAL DAILY
Qty: 90 TABLET | Refills: 0 | Status: SHIPPED | OUTPATIENT
Start: 2024-06-13

## 2024-06-13 RX ORDER — ATORVASTATIN CALCIUM 20 MG/1
20 TABLET, FILM COATED ORAL DAILY
Qty: 90 TABLET | Refills: 0 | Status: SHIPPED | OUTPATIENT
Start: 2024-06-13

## 2024-06-14 LAB
BACTERIA UR QL AUTO: NORMAL /HPF
HYALINE CASTS UR QL AUTO: NORMAL /LPF
MAGNESIUM SERPL-MCNC: 1.9 MG/DL (ref 1.6–2.4)
RBC # UR STRIP: NORMAL /HPF
REF LAB TEST METHOD: NORMAL
SQUAMOUS #/AREA URNS HPF: NORMAL /HPF
WBC # UR STRIP: NORMAL /HPF

## 2024-06-19 ENCOUNTER — TELEPHONE (OUTPATIENT)
Dept: FAMILY MEDICINE CLINIC | Facility: CLINIC | Age: 64
End: 2024-06-19

## 2024-06-19 NOTE — TELEPHONE ENCOUNTER
"  Caller: Chi Ramirez II \"Luis\"    Relationship: Self    Best call back number: [phone]    What is the best time to reach you: ANYTIME    Who are you requesting to speak with (clinical staff, provider,  specific staff member): CLINICAL STAFF    Do you know the name of the person who called: PATIENT     What was the call regarding: PATIENT CALLED AND SAID HE THINKS HE MAY BE DIABETIC OR PRE-DIABETIC BUT HE DOESN'T KNOW AND NEEDS TO KNOW HOW HE CAN GO ABOUT GETTING TESTED FOR THIS     Is it okay if the provider responds through MyChart: NO  "

## 2024-06-25 ENCOUNTER — OFFICE VISIT (OUTPATIENT)
Dept: CARDIOLOGY | Facility: CLINIC | Age: 64
End: 2024-06-25
Payer: COMMERCIAL

## 2024-06-25 VITALS
BODY MASS INDEX: 42.42 KG/M2 | HEART RATE: 80 BPM | SYSTOLIC BLOOD PRESSURE: 119 MMHG | DIASTOLIC BLOOD PRESSURE: 78 MMHG | HEIGHT: 71 IN | RESPIRATION RATE: 18 BRPM | WEIGHT: 303 LBS

## 2024-06-25 DIAGNOSIS — I10 PRIMARY HYPERTENSION: Primary | ICD-10-CM

## 2024-06-26 ENCOUNTER — TELEPHONE (OUTPATIENT)
Dept: CARDIOLOGY | Facility: CLINIC | Age: 64
End: 2024-06-26
Payer: COMMERCIAL

## 2024-06-26 NOTE — TELEPHONE ENCOUNTER
"  Caller: Chi Ramirez II \"Luis\"    Relationship: Self    Best call back number: [phone]    What was the call regarding: PT SAW DR LIU YESTERDAY AND HE WAS WANTING SOME INFO IN REGARDS TO THE ECHO PT HAD DONE AT [facility]. PT SPOKE WITH [facility] AND STATES IF WE CALL OVER [phone] THEY WILL BE ABLE TO RELEASE THE ECHOCARDIOGRAM TO US FOR DR LIU TO REVIEW.         "

## 2024-06-29 DIAGNOSIS — I10 PRIMARY HYPERTENSION: ICD-10-CM

## 2024-07-01 ENCOUNTER — LAB (OUTPATIENT)
Dept: FAMILY MEDICINE CLINIC | Facility: CLINIC | Age: 64
End: 2024-07-01
Payer: COMMERCIAL

## 2024-07-01 ENCOUNTER — OFFICE VISIT (OUTPATIENT)
Dept: FAMILY MEDICINE CLINIC | Facility: CLINIC | Age: 64
End: 2024-07-01
Payer: COMMERCIAL

## 2024-07-01 VITALS
SYSTOLIC BLOOD PRESSURE: 123 MMHG | HEIGHT: 71 IN | WEIGHT: 305.6 LBS | RESPIRATION RATE: 18 BRPM | HEART RATE: 86 BPM | OXYGEN SATURATION: 87 % | DIASTOLIC BLOOD PRESSURE: 67 MMHG | BODY MASS INDEX: 42.78 KG/M2

## 2024-07-01 DIAGNOSIS — I48.0 PAROXYSMAL ATRIAL FIBRILLATION: ICD-10-CM

## 2024-07-01 DIAGNOSIS — R73.9 HYPERGLYCEMIA: ICD-10-CM

## 2024-07-01 DIAGNOSIS — I10 PRIMARY HYPERTENSION: Primary | ICD-10-CM

## 2024-07-01 DIAGNOSIS — Z12.11 COLON CANCER SCREENING: ICD-10-CM

## 2024-07-01 DIAGNOSIS — E78.2 MIXED HYPERLIPIDEMIA: ICD-10-CM

## 2024-07-01 LAB
CHOLEST SERPL-MCNC: 159 MG/DL (ref 0–200)
HBA1C MFR BLD: 5.9 % (ref 4.8–5.6)
HDLC SERPL-MCNC: 45 MG/DL (ref 40–60)
LDLC SERPL CALC-MCNC: 97 MG/DL (ref 0–100)
LDLC/HDLC SERPL: 2.14 {RATIO}
TRIGL SERPL-MCNC: 89 MG/DL (ref 0–150)
TSH SERPL DL<=0.05 MIU/L-ACNC: 2.26 UIU/ML (ref 0.27–4.2)
VLDLC SERPL-MCNC: 17 MG/DL (ref 5–40)

## 2024-07-01 PROCEDURE — 36415 COLL VENOUS BLD VENIPUNCTURE: CPT | Performed by: STUDENT IN AN ORGANIZED HEALTH CARE EDUCATION/TRAINING PROGRAM

## 2024-07-01 PROCEDURE — 99214 OFFICE O/P EST MOD 30 MIN: CPT | Performed by: STUDENT IN AN ORGANIZED HEALTH CARE EDUCATION/TRAINING PROGRAM

## 2024-07-01 PROCEDURE — 80061 LIPID PANEL: CPT | Performed by: STUDENT IN AN ORGANIZED HEALTH CARE EDUCATION/TRAINING PROGRAM

## 2024-07-01 PROCEDURE — 83036 HEMOGLOBIN GLYCOSYLATED A1C: CPT | Performed by: STUDENT IN AN ORGANIZED HEALTH CARE EDUCATION/TRAINING PROGRAM

## 2024-07-01 PROCEDURE — 84443 ASSAY THYROID STIM HORMONE: CPT | Performed by: STUDENT IN AN ORGANIZED HEALTH CARE EDUCATION/TRAINING PROGRAM

## 2024-07-01 RX ORDER — LOSARTAN POTASSIUM AND HYDROCHLOROTHIAZIDE 25; 100 MG/1; MG/1
1 TABLET ORAL DAILY
Qty: 90 TABLET | Refills: 1 | Status: SHIPPED | OUTPATIENT
Start: 2024-07-01

## 2024-07-01 NOTE — PROGRESS NOTES
"Chief Complaint  Chief Complaint   Patient presents with    Follow-up     Subjective        Chi Ramirez II is a 63 y.o. male who presents to [facility].    History of Present Illness  Here for concern for diabetes.  He has been told he was prediabetic in the past.  Last A1C was 5.9 Sept 2022.    That was also the last time his cholesterol was checked, LDL up at 120 at that time.  Recent visits w/ Dr. Darnell after episode of PAF following knee replacement surgery.  Last TSH check was Oct 2022.    He has seen sleep medicine, waiting for scheduling of in lab sleep study.  Last colonoscopy was April 2021, recommended 3 yr f/u.      Patient has been erroneously marked as diabetic. Based on the available clinical information, he does not have diabetes and should therefore be excluded from diabetic health maintenance and quality measures for the remainder of the reporting period.    Objective   /67   Pulse 86   Resp 18   Ht 180.3 cm (71\")   Wt (!) 139 kg (305 lb 9.6 oz)   SpO2 (!) 87%   BMI 42.62 kg/m²     Estimated body mass index is 42.62 kg/m² as calculated from the following:    Height as of this encounter: 180.3 cm (71\").    Weight as of this encounter: 139 kg (305 lb 9.6 oz).     Physical Exam   GEN: In no acute distress, non toxic appearing  NEURO: AAO to person, place, and time. CN 2-12 intact grossly.   PSYCH: Affect normal, insight fair      Result Review :              Assessment and Plan     Diagnoses and all orders for this visit:    1. Primary hypertension (Primary)  Blood pressure at goal today, continue amlodipine 2.5 mg daily, losartan-hydrochlorothiazide 100-25 mg daily.    2. Mixed hyperlipidemia  Check lipids today, continue atorvastatin 20 mg daily.  -     Lipid Panel    3. Colon cancer screening  -     Ambulatory Referral For Screening Colonoscopy    4. Hyperglycemia  Prediabetic with A1c of 5.9 last checked in 2022.  Recheck today.  -     Hemoglobin " A1c  -     TSH    5. Paroxysmal atrial fibrillation  Check thyroid function.  -     TSH       Follow Up   Pending above

## 2024-07-08 ENCOUNTER — TELEPHONE (OUTPATIENT)
Dept: CARDIOLOGY | Facility: CLINIC | Age: 64
End: 2024-07-08
Payer: COMMERCIAL

## 2024-07-08 NOTE — TELEPHONE ENCOUNTER
Caller: [employer]    Relationship: JENNIFER Mcclellan call back number: [phone]    What is the best time to reach you: ANYTIME    Who are you requesting to speak with (clinical staff, provider,  specific staff member): CLINICAL      What was the call regarding: INSURANCE WILL NOT COVER JARDIANCE UNTIL PT DOES STEP THERAPY  ALTERNATIVES ARE   METFORMIN OR METFORMIN EXTENDED RELEASE

## 2024-07-09 ENCOUNTER — HOSPITAL ENCOUNTER (OUTPATIENT)
Dept: SLEEP MEDICINE | Facility: HOSPITAL | Age: 64
Discharge: HOME | End: 2024-07-09
Admitting: FAMILY MEDICINE
Payer: COMMERCIAL

## 2024-07-09 DIAGNOSIS — R09.02 HYPOXIA: ICD-10-CM

## 2024-07-09 DIAGNOSIS — Z79.891 LONG-TERM CURRENT USE OF OPIATE ANALGESIC: ICD-10-CM

## 2024-07-09 DIAGNOSIS — G47.19 EXCESSIVE DAYTIME SLEEPINESS: ICD-10-CM

## 2024-07-09 DIAGNOSIS — J44.9 CHRONIC OBSTRUCTIVE PULMONARY DISEASE, UNSPECIFIED COPD TYPE: ICD-10-CM

## 2024-07-09 DIAGNOSIS — R29.818 SUSPECTED SLEEP APNEA: ICD-10-CM

## 2024-07-09 DIAGNOSIS — J96.92 RESPIRATORY FAILURE WITH HYPERCAPNIA, UNSPECIFIED CHRONICITY: ICD-10-CM

## 2024-07-09 DIAGNOSIS — R06.83 SNORING: ICD-10-CM

## 2024-07-09 DIAGNOSIS — G89.29 OTHER CHRONIC PAIN: ICD-10-CM

## 2024-07-09 DIAGNOSIS — R06.81 WITNESSED EPISODE OF APNEA: ICD-10-CM

## 2024-07-09 PROCEDURE — 95811 POLYSOM 6/>YRS CPAP 4/> PARM: CPT

## 2024-07-09 NOTE — PROGRESS NOTES
Cardiology Clinic Note  Darwin Darnell MD, PhD    Subjective:     Encounter Date:06/25/2024      Patient ID: Chi Ramirez II is a 63 y.o. male.    Chief Complaint:  Chief Complaint   Patient presents with    Follow-up       HPI:      I had the pleasure to see this 63-year-old gentleman today  in follow-up with history of essential hypertension hyperlipidemia morbid obesity with BMI greater than 40, history of perioperative atrial fibrillation around knee surgery.  Reportedly had an episode of perioperative atrial fibrillation with a prior knee surgery was sent for evaluation as he needs the other knee done.  He is largely sedentary, does complain of a significant degree of shortness of breath and dyspnea on exertion.  He does have significant swelling in his lower extremities historically.  We have talked about his overall health today extensively with poor exertional capacity, dyspnea on exertion, essential hypertension hyperlipidemia, risk factors for sleep apnea, abnormal findings on his heart monitor below which would suggest sleep apnea with significant bradycardia while sleeping, he is a chronic smoker of 1 and half packs per day for many years with abnormal PFTs even back to 2019 demonstrating obstructive disease obstructive physiology consistent with COPD.  He was diabetes on metformin and now Jardiance as well as a comorbidity.  We discussed risk factor modification, need for diet exercise and weight loss, he recently had echo at [facility] in [place] which is unavailable at this time.    Echo at [facility] February 2024    Heart monitor May 2024, abnormal study, first-degree AV block was present also bundle branch block, 4 pauses longer than 3.1 seconds with second-degree block Mobitz 1, single episode of third-degree heart block with junctional escape backup rate in the 20s without triggered events, these occurred 4 in the morning, 10 in the morning.      Review of systems otherwise  negative x 14 point review of systems except as mentioned above     EKG reviewed interpreted by me demonstrates sinus rhythm, left anterior fascicular block, right bundle branch block         Historical data copied forward from previous encounters in EMR including the history, exam, and assessment/plan has been reviewed and is unchanged unless noted otherwise.     Cardiac medicines reviewed with risk, benefits, and necessity of each discussed.     Risk and benefit of cardiac testing reviewed including death heart attack stroke pain bleeding infection need for vascular /cardiovascular surgery were discussed and the patient      Objective:         Vitals reviewed below     Physical Exam  regular rate and rhythm with no rubs gallops heave or lift, 1 out of 6 systolic ejection murmur low sternal border  Morbid obesity, soft nontender  Intact grossly  No bony abnormalities  To 3+ pitting edema lower extremities below the knees  Normal cap refill normal radial pulses  No carotid bruits, positive JVD HJR  Mildly delayed expiratory phase no gross wheezing  No rhonchi     Assessment:         Assessment  Independently manage medical conditions     Perioperative atrial fibrillation paroxysmal   Essential hypertension  Hyperlipidemia  Risk factors for CAD  Dyspnea on exertion  Peripheral edema  Fatigue and daytime tiredness concern for sleep apnea  Preventative health care  Abnormal Holter monitor with pauses and AV block suspicious for sleep apnea  Preventative health care          2D echo for structural and functional evaluation from [facility] we requested, we will not order repeat at this time, repeated record request    Increase Lasix to 80 daily with volume overload evident on previous exam although patient only taking this on the weekend and is not taking this as prescribed and remains volume overloaded  Discussed compliance although he says he drives and cannot take this regularly  Fluid and salt restriction was  "discussed  Jardiance 10 mg daily  Reorder sleep study  CMP magnesium and UA in 10 days  Coronary calcium scoring for risk assessment of CAD ordered but not completed  Refer to bariatrics if needed, diet exercise and weight loss are essential for this patient moving forward     Perioperative evaluation  Has not had ischemic evaluation  Paroxysmal atrial fibrillation but presently in sinus rhythm with conduction abnormalities with left intrafascicular block and right bundle branch block with sinus rhythm, heart monitor with sinus rhythm only although with AV block indicating sleep apnea  Would not warrant antiarrhythmics at this time  Smoking cessation counseling discussed extensively today    Patient remains volume overloaded today on exam, discussed medication adherence regimen, follow-up with sleep study, follow-up and correlation with pulmonary pressures by 2D echo, afterload reduction, goals for blood pressure heart rate weight, diet, lipids, A1c all discussed today     3-month follow-up     Darwin Darnell MD, PhD       Objective:         /78 (BP Location: Right arm, Patient Position: Sitting)   Pulse 80   Resp 18   Ht 180.3 cm (71\")   Wt (!) 137 kg (303 lb)   BMI 42.26 kg/m²     Physical Exam    Assessment:         Diagnoses and all orders for this visit:    1. Primary hypertension (Primary)  -     Comprehensive Metabolic Panel; Future  -     Magnesium; Future           Plan:              The pleasure to be involved in this patient's cardiovascular care.  Please call with any questions or concerns  Darwin Darnell MD, PhD    Most recent EKG as reviewed and interpreted by me:  Procedures     Most recent echo as reviewed and interpreted by me:      Most recent stress test as reviewed and interpreted by me:      Most recent cardiac catheterization as reviewed interpreted by me:  No results found for this or any previous visit.    The following portions of the patient's history were reviewed and updated as " appropriate: allergies, current medications, past family history, past medical history, past social history, past surgical history, and problem list.      ROS:  14 point review of systems negative except as mentioned above    Current Outpatient Medications:     amLODIPine (NORVASC) 2.5 MG tablet, TAKE 1 TABLET BY MOUTH DAILY, Disp: 90 tablet, Rfl: 0    aspirin 81 MG EC tablet, Take 1 tablet by mouth Daily., Disp: , Rfl:     atorvastatin (LIPITOR) 20 MG tablet, TAKE 1 TABLET BY MOUTH DAILY, Disp: 90 tablet, Rfl: 0    furosemide (Lasix) 80 MG tablet, Take 1 tablet by mouth Daily. (Patient taking differently: Take 1 tablet by mouth Daily. Only takes on the weekend.), Disp: 30 tablet, Rfl: 5    gabapentin (NEURONTIN) 300 MG capsule, Take 1 capsule by mouth 2 (Two) Times a Day., Disp: , Rfl:     HYDROcodone-acetaminophen (Norco)  MG per tablet, Take 1 tablet by mouth Every 6 (Six) Hours As Needed for Moderate Pain., Disp: 120 tablet, Rfl: 0    HYDROcodone-acetaminophen (Norco)  MG per tablet, Take 1 tablet by mouth Every 6 (Six) Hours As Needed for Moderate Pain., Disp: 120 tablet, Rfl: 0    HYDROcodone-acetaminophen (Norco)  MG per tablet, Take 1 tablet by mouth Every 6 (Six) Hours As Needed for Moderate Pain., Disp: 120 tablet, Rfl: 0    meloxicam (MOBIC) 15 MG tablet, TAKE ONE TABLET BY MOUTH DAILY, Disp: 90 tablet, Rfl: 1    metFORMIN ER (GLUCOPHAGE-XR) 500 MG 24 hr tablet, Take 1 tablet by mouth Daily With Dinner., Disp: 90 tablet, Rfl: 1    multivitamin with minerals tablet tablet, Take 1 tablet by mouth Daily., Disp: , Rfl:     naproxen sodium (ALEVE) 220 MG tablet, Take 3 tablets by mouth Every Morning. 4 tabs daily, Disp: , Rfl:     omeprazole (priLOSEC) 40 MG capsule, TAKE ONE CAPSULE BY MOUTH DAILY, Disp: 90 capsule, Rfl: 3    empagliflozin (Jardiance) 10 MG tablet tablet, Take 1 tablet by mouth Daily., Disp: 30 tablet, Rfl: 3    losartan-hydrochlorothiazide (HYZAAR) 100-25 MG per tablet,  TAKE 1 TABLET BY MOUTH DAILY, Disp: 90 tablet, Rfl: 1    Problem List:  Patient Active Problem List   Diagnosis    Fatigue    Genital herpes    Hypersomnia    Hypertension    Hypogonadism    Pain of lower extremity    Obesity    Polyosteoarthritis    Tobacco use disorder    Vitamin D deficiency    Need for assessment for sleep apnea    Pure hypercholesterolemia    Chronic midline low back pain without sciatica    Chronic pain of left knee    Bilateral hip pain    Spondylosis of lumbar region without myelopathy or radiculopathy    Sacroiliitis    Prediabetes    Hypercapnic respiratory failure    Chronic pain of both shoulders    Rotator cuff arthropathy of both shoulders    COVID-19    Acute exacerbation of chronic obstructive pulmonary disease (COPD)    Cytokine release syndrome, grade 3     Past Medical History:  Past Medical History:   Diagnosis Date    Allergic rhinitis     Amputation, toe, traumatic with complication     Multiple toes    Asthma     Chronic leg pain     Chronic low back pain     Condyloma acuminata     COPD (chronic obstructive pulmonary disease)     Did not respond to inhalers    COVID     10/2022    Erectile dysfunction     Genital herpes     Hearing loss     Hyperlipidemia     Hypersomnia     Felt secondary to sleep apnea which patient will not get tested    Hypertension     Kidney stone     Myalgia     Obesity     Osteoarthritis     Rib fractures     Vitamin D deficiency      Past Surgical History:  Past Surgical History:   Procedure Laterality Date    AMPUTATION FOOT / TOE Left     4 toes     LIPOMA EXCISION      OTHER SURGICAL HISTORY      LT KNEE MENISCAL TEAR    OTHER SURGICAL HISTORY Left     SHOULDER ROTATOR CUFF    VASECTOMY       Social History:  Social History     Socioeconomic History    Marital status:    Tobacco Use    Smoking status: Every Day     Current packs/day: 1.50     Average packs/day: 1.5 packs/day for 25.0 years (37.5 ttl pk-yrs)     Types: Cigarettes     Smokeless tobacco: Never    Tobacco comments:     stop smoking   Vaping Use    Vaping status: Never Used   Substance and Sexual Activity    Alcohol use: Yes     Comment: rare    Drug use: Never    Sexual activity: Not Currently     Allergies:  No Known Allergies  Immunizations:  Immunization History   Administered Date(s) Administered    COVID-19 (PFIZER) Purple Cap Monovalent 03/23/2021, 04/06/2021    Fluzone (or Fluarix & Flulaval for VFC) >6mos 10/02/2020    Influenza Quad Vaccine (Inpatient) 11/27/2017    Pneumococcal Conjugate 13-Valent (PCV13) 01/15/2015    Pneumococcal Polysaccharide (PPSV23) 06/14/2019    Shingrix 06/14/2019            In-Office Procedure(s):  No orders to display        ASCVD RIsk Score::  The 10-year ASCVD risk score (Charu FERGUSON, et al., 2019) is: 15.5%    Values used to calculate the score:      Age: 63 years      Sex: Male      Is Non- : No      Diabetic: No      Tobacco smoker: Yes      Systolic Blood Pressure: 123 mmHg      Is BP treated: Yes      HDL Cholesterol: 45 mg/dL      Total Cholesterol: 159 mg/dL    Imaging:    Results for orders placed during the hospital encounter of 08/14/23    XR Knee 3 View Right    Narrative  XR KNEE 3 VW RIGHT    Date of Exam: 8/14/2023 11:47 AM EDT    Indication: knee pain    Comparison: None available.    Findings:  No acute fracture or dislocation is identified. There is severe tricompartmental degenerative changes with osteophytosis and joint space narrowing. This is most prominent in the patellofemoral compartment. There are enthesopathic changes at the insertion  of the quadriceps tendon. There is a trace knee effusion. Bone mineralization is decreased. No aggressive appearing lytic or sclerotic bone lesions.    Impression  Impression:    1. Moderate tricompartmental degenerative changes.      Electronically Signed: Aguila Lassiter MD  8/14/2023 12:14 PM EDT  Workstation ID: [id number]       Results for orders placed during the  hospital encounter of 03/11/22    CT Chest Low Dose Cancer Screening WO    Narrative  CT CHEST LOW DOSE CANCER SCREENING WO-    Date of Exam: 3/11/2022 4:22 PM    Indication: Lung cancer screening, >= 20 pk-yr smoking history, risk  factor(s) (Age >= 50y); Z72.0-Tobacco use    Comparison: None available.    Technique: Low dose CT imaging of the chest was performed without  intravenous contrast enhancement.  Automated exposure control and  iterative reconstruction methods were used.    FINDINGS:    3 mm noncalcified nodule within the right lower lobe (series 5 image 86)  cysts. 3 mm noncalcified left apical lung nodule (image 21). 2 mm nodule  within the left lower lobe (image 100).    No acute airspace disease. No abnormal bronchial wall thickening or  bronchiectasis. No pathologically enlarged lymph nodes. Heart size is  normal. No pericardial effusion or pleural effusion. No significant  coronary artery calcifications.    Included upper abdominal organs have a normal noncontrast appearance.    Multiple old left rib fractures. No acute or suspicious osseous  abnormality. Thyroid gland is within normal limits.    Impression  1. LUNG RADS category 2. Benign. 3 mm and 2 mm noncalcified pulmonary  nodules. Continued annual screening with low-dose CT chest is  recommended, following the LUNG RADS protocol.          Electronically Signed By-Alcira Norton MD On:3/11/2022 4:53 PM  This report was finalized on [id number] by  Alcira Norton MD.      Results for orders placed during the hospital encounter of 03/11/22    CT Chest Low Dose Cancer Screening WO    Narrative  CT CHEST LOW DOSE CANCER SCREENING WO-    Date of Exam: 3/11/2022 4:22 PM    Indication: Lung cancer screening, >= 20 pk-yr smoking history, risk  factor(s) (Age >= 50y); Z72.0-Tobacco use    Comparison: None available.    Technique: Low dose CT imaging of the chest was performed without  intravenous contrast enhancement.  Automated exposure control  and  iterative reconstruction methods were used.    FINDINGS:    3 mm noncalcified nodule within the right lower lobe (series 5 image 86)  cysts. 3 mm noncalcified left apical lung nodule (image 21). 2 mm nodule  within the left lower lobe (image 100).    No acute airspace disease. No abnormal bronchial wall thickening or  bronchiectasis. No pathologically enlarged lymph nodes. Heart size is  normal. No pericardial effusion or pleural effusion. No significant  coronary artery calcifications.    Included upper abdominal organs have a normal noncontrast appearance.    Multiple old left rib fractures. No acute or suspicious osseous  abnormality. Thyroid gland is within normal limits.    Impression  1. LUNG RADS category 2. Benign. 3 mm and 2 mm noncalcified pulmonary  nodules. Continued annual screening with low-dose CT chest is  recommended, following the LUNG RADS protocol.          Electronically Signed By-Alcira Norton MD On:3/11/2022 4:53 PM  This report was finalized on [id number] by  Alcira Norton MD.      Lab Review:   Lab on 06/13/2024   Component Date Value    Glucose 06/13/2024 90     BUN 06/13/2024 21     Creatinine 06/13/2024 0.87     Sodium 06/13/2024 143     Potassium 06/13/2024 4.2     Chloride 06/13/2024 102     CO2 06/13/2024 32.6 (H)     Calcium 06/13/2024 9.7     Total Protein 06/13/2024 6.7     Albumin 06/13/2024 4.2     ALT (SGPT) 06/13/2024 16     AST (SGOT) 06/13/2024 23     Alkaline Phosphatase 06/13/2024 102     Total Bilirubin 06/13/2024 0.3     Globulin 06/13/2024 2.5     A/G Ratio 06/13/2024 1.7     BUN/Creatinine Ratio 06/13/2024 24.1     Anion Gap 06/13/2024 8.4     eGFR 06/13/2024 97.0     Magnesium 06/13/2024 1.9     Color, UA 06/13/2024 Yellow     Appearance, UA 06/13/2024 Clear     pH, UA 06/13/2024 7.0     Specific Gravity, UA 06/13/2024 1.027     Glucose, UA 06/13/2024 Negative     Ketones, UA 06/13/2024 Trace (A)     Bilirubin, UA 06/13/2024 Negative     Blood, UA 06/13/2024  Negative     Protein, UA 06/13/2024 Trace (A)     Leuk Esterase, UA 06/13/2024 Negative     Nitrite, UA 06/13/2024 Negative     Urobilinogen, UA 06/13/2024 1.0 E.U./dL     RBC, UA 06/13/2024 0-2     WBC, UA 06/13/2024 0-2     Bacteria, UA 06/13/2024 None Seen     Squamous Epithelial Cell* 06/13/2024 0-2     Hyaline Casts, UA 06/13/2024 0-2     Methodology 06/13/2024 Automated Microscopy      Recent labs reviewed and interpreted for clinical significance and application            Level of Care:           Darwin Darnell MD  07/09/24  .

## 2024-07-10 VITALS — HEIGHT: 71 IN | BODY MASS INDEX: 42.9 KG/M2 | WEIGHT: 306.44 LBS

## 2024-07-10 DIAGNOSIS — G47.33 OBSTRUCTIVE SLEEP APNEA, ADULT: Primary | ICD-10-CM

## 2024-07-10 DIAGNOSIS — G47.34 SLEEP RELATED HYPOXIA: ICD-10-CM

## 2024-07-10 PROCEDURE — 95811 POLYSOM 6/>YRS CPAP 4/> PARM: CPT | Performed by: FAMILY MEDICINE

## 2024-07-11 ENCOUNTER — TELEPHONE (OUTPATIENT)
Dept: CARDIOLOGY | Facility: CLINIC | Age: 64
End: 2024-07-11
Payer: COMMERCIAL

## 2024-07-11 NOTE — TELEPHONE ENCOUNTER
Lvm to let patient know that we have jardiance 10mg 1x/daily ready for pickup now and to please call back if he does NOT need them now

## 2024-07-11 NOTE — TELEPHONE ENCOUNTER
Caller: JENNIFER    Relationship: Other    Best call back number: [phone]    What are your concerns: JENNIFER FROM [employer] [employer] IS CALLING TO LET US KNOW THAT THEY ARE REQUESTING THE PT TO TRY A DIFFERENT PRESCRIPTION (METFORMIN) BEFORE THE JARDIANCE.

## 2024-07-22 ENCOUNTER — TELEPHONE (OUTPATIENT)
Dept: CARDIOLOGY | Facility: CLINIC | Age: 64
End: 2024-07-22

## 2024-07-22 NOTE — TELEPHONE ENCOUNTER
"     Caller: Chi Ramirez II \"Luis\"    Relationship to patient: Self    Best call back number: [phone]    Patient is needing: PATIENT DROPPED OFF CARDIAC CLEARANCE  TO BE COMPLETED AND FAXED TO [facility]. PATIENT CHECKING TO SEE IF THAT'S COMPLETED.     PATIENT ALSO CHECKING FOR ANY UPDATE ON HIS SLEEP STUDY.   "

## 2024-07-24 ENCOUNTER — OFFICE VISIT (OUTPATIENT)
Dept: FAMILY MEDICINE CLINIC | Facility: CLINIC | Age: 64
End: 2024-07-24
Payer: COMMERCIAL

## 2024-07-24 VITALS
HEIGHT: 71 IN | WEIGHT: 298.8 LBS | RESPIRATION RATE: 19 BRPM | DIASTOLIC BLOOD PRESSURE: 68 MMHG | BODY MASS INDEX: 41.83 KG/M2 | OXYGEN SATURATION: 89 % | SYSTOLIC BLOOD PRESSURE: 142 MMHG | HEART RATE: 94 BPM

## 2024-07-24 DIAGNOSIS — M17.12 PRIMARY OSTEOARTHRITIS OF LEFT KNEE: ICD-10-CM

## 2024-07-24 DIAGNOSIS — E78.00 PURE HYPERCHOLESTEROLEMIA: ICD-10-CM

## 2024-07-24 DIAGNOSIS — I10 PRIMARY HYPERTENSION: ICD-10-CM

## 2024-07-24 DIAGNOSIS — Z01.818 PRE-OPERATIVE CLEARANCE: Primary | ICD-10-CM

## 2024-07-24 PROCEDURE — 99213 OFFICE O/P EST LOW 20 MIN: CPT | Performed by: STUDENT IN AN ORGANIZED HEALTH CARE EDUCATION/TRAINING PROGRAM

## 2024-07-24 NOTE — PROGRESS NOTES
"Chief Complaint  Chief Complaint   Patient presents with    Surgical Clearance     Subjective        Chi Ramirez II is a 63 y.o. male who presents to [facility].    History of Present Illness  He is scheduled to have L knee replaced next week.  He has already received cardiac clearance from Dr. Darnell.    He is still fatigued during the day.  Recent sleep study showed hypoxia during sleep so awaiting machine.  Recent A1C of 5.9 on 7/1/2024.    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  He is having extensive pre-op testing.      Objective   /68   Pulse 94   Resp 19   Ht 180.3 cm (70.98\")   Wt 136 kg (298 lb 12.8 oz)   SpO2 (!) 89%   BMI 41.70 kg/m²     Estimated body mass index is 41.7 kg/m² as calculated from the following:    Height as of this encounter: 180.3 cm (70.98\").    Weight as of this encounter: 136 kg (298 lb 12.8 oz).     Physical Exam   GEN: In no acute distress, non toxic appearing  HEENT: Pupils equal and reactive to light, sclera clear. Mucous membranes moist.   CV: Regular rate and rhythm, no murmurs, 2+ peripheral pulses, 1+ bilateral lower extremity edema.   RESP: Lungs clear to auscultation anteriorly and posteriorly in all lung fields bilaterally.     Result Review :              Assessment and Plan     Diagnoses and all orders for this visit:    1. Pre-operative clearance (Primary)    2. Primary hypertension    3. Pure hypercholesterolemia    4. Primary osteoarthritis of left knee    From my perspective he is okay to proceed with surgery.  Cardiac clearance is the most important at this point, especially with his a fib following last surgery.  Anesthesia should be notified that he does have sleep apnea as his oxygen will need to be monitored closely.  Continue with preoperative testing as ordered by the surgery office.  Discussed importance of PT following the surgery.     "     Follow Up   Following surgery

## 2024-07-25 ENCOUNTER — HOSPITAL ENCOUNTER (OUTPATIENT)
Dept: CT IMAGING | Facility: HOSPITAL | Age: 64
Discharge: HOME | End: 2024-07-25
Admitting: INTERNAL MEDICINE

## 2024-07-25 DIAGNOSIS — I48.0 AF (PAROXYSMAL ATRIAL FIBRILLATION): ICD-10-CM

## 2024-07-25 PROCEDURE — 75571 CT HRT W/O DYE W/CA TEST: CPT

## 2024-08-09 NOTE — TELEPHONE ENCOUNTER
Called patient about samples that we had put aside for him  He is in program to get his Jardiance He doesn't need samples at this time

## 2024-08-22 ENCOUNTER — OFFICE VISIT (OUTPATIENT)
Dept: PAIN MEDICINE | Facility: CLINIC | Age: 64
End: 2024-08-22
Payer: COMMERCIAL

## 2024-08-22 VITALS
RESPIRATION RATE: 16 BRPM | OXYGEN SATURATION: 97 % | WEIGHT: 292 LBS | DIASTOLIC BLOOD PRESSURE: 79 MMHG | HEART RATE: 82 BPM | BODY MASS INDEX: 40.75 KG/M2 | SYSTOLIC BLOOD PRESSURE: 158 MMHG

## 2024-08-22 DIAGNOSIS — M47.816 SPONDYLOSIS OF LUMBAR REGION WITHOUT MYELOPATHY OR RADICULOPATHY: ICD-10-CM

## 2024-08-22 DIAGNOSIS — M46.1 SACROILIITIS: ICD-10-CM

## 2024-08-22 DIAGNOSIS — G89.29 CHRONIC PAIN OF LEFT KNEE: ICD-10-CM

## 2024-08-22 DIAGNOSIS — G89.29 CHRONIC MIDLINE LOW BACK PAIN WITHOUT SCIATICA: Primary | ICD-10-CM

## 2024-08-22 DIAGNOSIS — G89.29 CHRONIC PAIN OF BOTH SHOULDERS: ICD-10-CM

## 2024-08-22 DIAGNOSIS — M25.552 BILATERAL HIP PAIN: ICD-10-CM

## 2024-08-22 DIAGNOSIS — M25.511 CHRONIC PAIN OF BOTH SHOULDERS: ICD-10-CM

## 2024-08-22 DIAGNOSIS — M54.50 CHRONIC MIDLINE LOW BACK PAIN WITHOUT SCIATICA: Primary | ICD-10-CM

## 2024-08-22 DIAGNOSIS — M12.811 ROTATOR CUFF ARTHROPATHY OF BOTH SHOULDERS: ICD-10-CM

## 2024-08-22 DIAGNOSIS — M25.562 CHRONIC PAIN OF LEFT KNEE: ICD-10-CM

## 2024-08-22 DIAGNOSIS — M12.812 ROTATOR CUFF ARTHROPATHY OF BOTH SHOULDERS: ICD-10-CM

## 2024-08-22 DIAGNOSIS — M25.551 BILATERAL HIP PAIN: ICD-10-CM

## 2024-08-22 DIAGNOSIS — M25.512 CHRONIC PAIN OF BOTH SHOULDERS: ICD-10-CM

## 2024-08-22 PROCEDURE — 99214 OFFICE O/P EST MOD 30 MIN: CPT | Performed by: PHYSICAL MEDICINE & REHABILITATION

## 2024-08-22 RX ORDER — HYDROCODONE BITARTRATE AND ACETAMINOPHEN 10; 325 MG/1; MG/1
1 TABLET ORAL EVERY 6 HOURS PRN
Qty: 120 TABLET | Refills: 0 | Status: SHIPPED | OUTPATIENT
Start: 2024-08-22

## 2024-08-22 NOTE — PROGRESS NOTES
Subjective   Chi Ramirez II is a 63 y.o. male.     History of Present Illness  Chronic low back pain, nonradiating, also left knee pain and b/l hip pain, 6/10 at worst, 3/10 at best, always present, varies, began 2018 with workplace injury, burning, tingling, worse with standing and walking, interferes with ADLs, sleep, failed PT. 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 Saw PCP, notes reviewed, as above, with referral for pain management, stable on Norco 10mg TID prn, no red flags notes. No FH of substance abuse. Increased to Norco 10mg QID prn with better relief. Worsening pain over b/l SIJ radiating to front, worse with standing after sitting, climbing down from truck. Also worsening pain over right CMC joint. Had acute pain in R  knee, went to ED, seen by Ortho, had knee drained and injected with steroid with some relief, upcoming f/u appt to review MRI. Had R TKA, then left.   Back Pain  Pertinent negatives include no abdominal pain, bladder incontinence, chest pain, fever, numbness or weakness.        The following portions of the patient's history were reviewed and updated as appropriate: allergies, current medications, past family history, past medical history, past social history, past surgical history and problem list.    Review of Systems   Constitutional:  Positive for fatigue. Negative for chills and fever.   HENT:  Positive for hearing loss. Negative for trouble swallowing.    Eyes:  Negative for visual disturbance.   Respiratory:  Positive for shortness of breath.    Cardiovascular:  Negative for chest pain.   Gastrointestinal:  Positive for constipation. Negative for abdominal pain, diarrhea, nausea and vomiting.   Genitourinary:  Negative for urinary incontinence.   Musculoskeletal:  Positive for arthralgias, back pain and joint swelling. Negative for myalgias and neck pain.   Neurological:  Positive for headache. Negative for dizziness, weakness and numbness.       Objective    Physical Exam  Constitutional:       Appearance: Normal appearance. He is well-developed.   HENT:      Head: Normocephalic and atraumatic.   Eyes:      Extraocular Movements: Extraocular movements intact.      Pupils: Pupils are equal, round, and reactive to light.   Cardiovascular:      Rate and Rhythm: Normal rate and regular rhythm.      Heart sounds: Normal heart sounds.   Pulmonary:      Effort: Pulmonary effort is normal.      Breath sounds: Normal breath sounds.   Abdominal:      General: Bowel sounds are normal. There is no distension.      Palpations: Abdomen is soft.      Tenderness: There is no abdominal tenderness.   Musculoskeletal:      Cervical back: Normal range of motion.      Comments: Pain in b/l groin with FADIR  B/l shoulder: (+) empty can test R>>L   Neurological:      Mental Status: He is alert and oriented to person, place, and time.      Sensory: No sensory deficit.      Deep Tendon Reflexes: Reflexes are normal and symmetric.   Psychiatric:         Mood and Affect: Mood normal.         Behavior: Behavior normal.         Thought Content: Thought content normal.         Judgment: Judgment normal.           Assessment & Plan   Diagnoses and all orders for this visit:    1. Chronic midline low back pain without sciatica (Primary)    2. Chronic pain of both shoulders    3. Chronic pain of left knee    4. Bilateral hip pain    5. Rotator cuff arthropathy of both shoulders    6. Sacroiliitis    7. Spondylosis of lumbar region without myelopathy or radiculopathy      UDS in order 11/16/23.  Discussed risks and benefits of opioid treatment for chonic pain with patient, including expectations related to prescription requests, alternative modalities to opioids for managing pain, her treatment plan, risks of dependency and addiction, and safe storage practices for prescribed opioids, as well as proper and improper disposal of all medications.  Treatment plan will consist of continuing current  medication as long as it remains effective and is necessary, while evaluating patient at each visit and determining if the medication can be lowered or discontinued, while also using nonopioid therapies to reduce reliance on opioids.  Increased to Norco 10mg QID prn, doing well now. Filled 7/30/24.   Patient's symptoms are still adequately managed by current medication regimen, is doing well at this strength and dosage, therefore I will continue to prescribe unchanged as the most appropriate course of treatment.  Constipation controlled with OTC stool softener.  Discussed MBB and RFA of b/l L3-5 facet joints in depth, he will consider it, likely schedule it next visit.  Performed b/l SIJ injections with good relief.  Performed b/l shoulder subacromial injections with same-day relief only, will not repeat, declines referral for surgical eval at this time.  May inject L knee, b/l hips in future. Had R TKA, then L TKA.  RTC in 3 months for f/u.      INSPECT REPORT     As part of the patient's treatment plan, I am prescribing controlled substances. The patient has been made aware of appropriate use of such medications, including potential risk of somnolence, limited ability to drive and/or work safely, and the potential for dependence or overdose. It has also bee made clear that these medications are for use by this patient only, without concomitant use of alcohol or other substances unless prescribed.      Patient has completed prescribing agreement detailing terms of continued prescribing of controlled substances, including monitoring INSPECT reports, urine drug screening, and pill counts if necessary. The patient is aware that inappropriate use will results in cessation of prescribing such medications.     INSPECT report has been reviewed and scanned into the patient's chart.     As the clinician, I personally reviewed the INSPECT while the patient was in the office today.     History and physical exam exhibit  continued safe and appropriate use of controlled substances.

## 2024-09-30 ENCOUNTER — TELEPHONE (OUTPATIENT)
Dept: FAMILY MEDICINE CLINIC | Facility: CLINIC | Age: 64
End: 2024-09-30
Payer: COMMERCIAL

## 2024-09-30 DIAGNOSIS — B00.1 RECURRENT COLD SORES: Primary | ICD-10-CM

## 2024-09-30 RX ORDER — VALACYCLOVIR HYDROCHLORIDE 500 MG/1
500 TABLET, FILM COATED ORAL 2 TIMES DAILY PRN
Qty: 30 TABLET | Refills: 1 | Status: SHIPPED | OUTPATIENT
Start: 2024-09-30

## 2024-09-30 NOTE — TELEPHONE ENCOUNTER
"    Caller: Chi Ramirez II \"Luis\"    Relationship: Self    Best call back number:     [phone] (Mobile)       What medication are you requesting:     valACYclovir (VALTREX) 500 MG tablet     What are your current symptoms: HERPES       Have you had these symptoms before:    [x] Yes  [] No    Have you been treated for these symptoms before:   [x] Yes  [] No    If a prescription is needed, what is your preferred pharmacy and phone number: [facility] [id number] - [place] - [address] [place] [address] - [phone] [facility] [phone]      Additional notes:          "

## 2024-10-15 ENCOUNTER — OFFICE VISIT (OUTPATIENT)
Dept: CARDIOLOGY | Facility: CLINIC | Age: 64
End: 2024-10-15
Payer: COMMERCIAL

## 2024-10-15 VITALS
BODY MASS INDEX: 40.88 KG/M2 | SYSTOLIC BLOOD PRESSURE: 108 MMHG | HEART RATE: 81 BPM | RESPIRATION RATE: 18 BRPM | WEIGHT: 292 LBS | OXYGEN SATURATION: 89 % | DIASTOLIC BLOOD PRESSURE: 73 MMHG | HEIGHT: 71 IN

## 2024-10-15 DIAGNOSIS — I48.0 AF (PAROXYSMAL ATRIAL FIBRILLATION): ICD-10-CM

## 2024-10-15 DIAGNOSIS — I10 PRIMARY HYPERTENSION: Primary | ICD-10-CM

## 2024-10-15 PROCEDURE — 99214 OFFICE O/P EST MOD 30 MIN: CPT | Performed by: INTERNAL MEDICINE

## 2024-10-15 RX ORDER — NAPROXEN SODIUM 220 MG/1
220 TABLET, FILM COATED ORAL 2 TIMES DAILY PRN
COMMUNITY

## 2024-10-15 RX ORDER — ASPIRIN 81 MG/1
81 TABLET ORAL DAILY
COMMUNITY

## 2024-10-15 RX ORDER — TORSEMIDE 20 MG/1
20 TABLET ORAL DAILY
Qty: 90 TABLET | Refills: 3 | Status: SHIPPED | OUTPATIENT
Start: 2024-10-15

## 2024-10-15 RX ORDER — POTASSIUM CHLORIDE 750 MG/1
10 TABLET, EXTENDED RELEASE ORAL DAILY
Qty: 90 TABLET | Refills: 3 | Status: SHIPPED | OUTPATIENT
Start: 2024-10-15

## 2024-10-30 ENCOUNTER — TELEPHONE (OUTPATIENT)
Dept: FAMILY MEDICINE CLINIC | Facility: CLINIC | Age: 64
End: 2024-10-30
Payer: COMMERCIAL

## 2024-10-30 DIAGNOSIS — I10 PRIMARY HYPERTENSION: Primary | ICD-10-CM

## 2024-10-30 NOTE — TELEPHONE ENCOUNTER
"    Caller: Chi Ramirez II \"Luis\"    Relationship: Self    Best call back number: [phone]     What medication are you requesting: METOPROLOL 25 MG    What are your current symptoms:     How long have you been experiencing symptoms:     Have you had these symptoms before:    [x] Yes  [] No    Have you been treated for these symptoms before:   [x] Yes  [] No    If a prescription is needed, what is your preferred pharmacy and phone number:  [facility] [id number] - [place] - [address] [place] [address] - [phone] [facility] [phone]      Additional notes:        "

## 2024-11-01 NOTE — TELEPHONE ENCOUNTER
Spoke with patient he states he took his last pill this morning. He states that he takes metoprolol succinate 25 mg daily.

## 2024-11-04 RX ORDER — METOPROLOL SUCCINATE 25 MG/1
25 TABLET, EXTENDED RELEASE ORAL DAILY
Qty: 90 TABLET | Refills: 3 | Status: SHIPPED | OUTPATIENT
Start: 2024-11-04

## 2024-11-21 ENCOUNTER — OFFICE VISIT (OUTPATIENT)
Dept: PAIN MEDICINE | Facility: CLINIC | Age: 64
End: 2024-11-21
Payer: COMMERCIAL

## 2024-11-21 VITALS
RESPIRATION RATE: 16 BRPM | BODY MASS INDEX: 40.74 KG/M2 | SYSTOLIC BLOOD PRESSURE: 129 MMHG | HEART RATE: 80 BPM | DIASTOLIC BLOOD PRESSURE: 83 MMHG | OXYGEN SATURATION: 94 % | WEIGHT: 292 LBS

## 2024-11-21 DIAGNOSIS — M79.605 PAIN IN BOTH LOWER EXTREMITIES: ICD-10-CM

## 2024-11-21 DIAGNOSIS — M25.552 BILATERAL HIP PAIN: ICD-10-CM

## 2024-11-21 DIAGNOSIS — M12.812 ROTATOR CUFF ARTHROPATHY OF BOTH SHOULDERS: ICD-10-CM

## 2024-11-21 DIAGNOSIS — M25.562 CHRONIC PAIN OF LEFT KNEE: ICD-10-CM

## 2024-11-21 DIAGNOSIS — G89.29 CHRONIC PAIN OF LEFT KNEE: ICD-10-CM

## 2024-11-21 DIAGNOSIS — M25.511 CHRONIC PAIN OF BOTH SHOULDERS: ICD-10-CM

## 2024-11-21 DIAGNOSIS — M46.1 SACROILIITIS: ICD-10-CM

## 2024-11-21 DIAGNOSIS — M47.816 SPONDYLOSIS OF LUMBAR REGION WITHOUT MYELOPATHY OR RADICULOPATHY: ICD-10-CM

## 2024-11-21 DIAGNOSIS — M25.551 BILATERAL HIP PAIN: ICD-10-CM

## 2024-11-21 DIAGNOSIS — M54.50 CHRONIC MIDLINE LOW BACK PAIN WITHOUT SCIATICA: Primary | ICD-10-CM

## 2024-11-21 DIAGNOSIS — M12.811 ROTATOR CUFF ARTHROPATHY OF BOTH SHOULDERS: ICD-10-CM

## 2024-11-21 DIAGNOSIS — G89.29 CHRONIC PAIN OF BOTH SHOULDERS: ICD-10-CM

## 2024-11-21 DIAGNOSIS — G89.29 CHRONIC MIDLINE LOW BACK PAIN WITHOUT SCIATICA: Primary | ICD-10-CM

## 2024-11-21 DIAGNOSIS — Z79.899 HIGH RISK MEDICATION USE: Primary | ICD-10-CM

## 2024-11-21 DIAGNOSIS — M25.512 CHRONIC PAIN OF BOTH SHOULDERS: ICD-10-CM

## 2024-11-21 DIAGNOSIS — M79.604 PAIN IN BOTH LOWER EXTREMITIES: ICD-10-CM

## 2024-11-21 RX ORDER — HYDROCODONE BITARTRATE AND ACETAMINOPHEN 10; 325 MG/1; MG/1
1 TABLET ORAL EVERY 6 HOURS PRN
Qty: 120 TABLET | Refills: 0 | Status: SHIPPED | OUTPATIENT
Start: 2024-11-21

## 2024-11-21 NOTE — PROGRESS NOTES
Subjective   Chi Ramirez II is a 63 y.o. male.     History of Present Illness  Chronic low back pain, nonradiating, also left knee pain and b/l hip pain, 6/10 at worst, 3/10 at best, always present, varies, began 2018 with workplace injury, burning, tingling, worse with standing and walking, interferes with ADLs, sleep, failed PT. 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 Saw PCP, notes reviewed, as above, with referral for pain management, stable on Norco 10mg TID prn, no red flags notes. No FH of substance abuse. Increased to Norco 10mg QID prn with better relief. Worsening pain over b/l SIJ radiating to front, worse with standing after sitting, climbing down from truck. Also worsening pain over right CMC joint. Had acute pain in R  knee, went to ED, seen by Ortho, had knee drained and injected with steroid with some relief, upcoming f/u appt to review MRI. Had R TKA, then left.   Back Pain  Pertinent negatives include no abdominal pain, bladder incontinence, chest pain, fever, numbness or weakness.        The following portions of the patient's history were reviewed and updated as appropriate: allergies, current medications, past family history, past medical history, past social history, past surgical history and problem list.    Review of Systems   Constitutional:  Positive for fatigue. Negative for chills and fever.   HENT:  Positive for hearing loss. Negative for trouble swallowing.    Eyes:  Negative for visual disturbance.   Respiratory:  Positive for shortness of breath.    Cardiovascular:  Negative for chest pain.   Gastrointestinal:  Positive for constipation. Negative for abdominal pain, diarrhea, nausea and vomiting.   Genitourinary:  Negative for urinary incontinence.   Musculoskeletal:  Positive for arthralgias, back pain and joint swelling. Negative for myalgias and neck pain.   Neurological:  Positive for headache. Negative for dizziness, weakness and numbness.       Objective    Physical Exam  Constitutional:       Appearance: Normal appearance. He is well-developed.   HENT:      Head: Normocephalic and atraumatic.   Eyes:      Extraocular Movements: Extraocular movements intact.      Pupils: Pupils are equal, round, and reactive to light.   Cardiovascular:      Rate and Rhythm: Normal rate and regular rhythm.      Heart sounds: Normal heart sounds.   Pulmonary:      Effort: Pulmonary effort is normal.      Breath sounds: Normal breath sounds.   Abdominal:      General: Bowel sounds are normal. There is no distension.      Palpations: Abdomen is soft.      Tenderness: There is no abdominal tenderness.   Musculoskeletal:      Cervical back: Normal range of motion.      Comments: Pain in b/l groin with FADIR  B/l shoulder: (+) empty can test R>>L  Trace laxity in R knee ant/post and varus/valgus     Neurological:      Mental Status: He is alert and oriented to person, place, and time.      Sensory: No sensory deficit.      Deep Tendon Reflexes: Reflexes are normal and symmetric.   Psychiatric:         Mood and Affect: Mood normal.         Behavior: Behavior normal.         Thought Content: Thought content normal.         Judgment: Judgment normal.           Assessment & Plan   Diagnoses and all orders for this visit:    1. Chronic midline low back pain without sciatica (Primary)    2. Chronic pain of both shoulders    3. Chronic pain of left knee    4. Pain in both lower extremities    5. Rotator cuff arthropathy of both shoulders    6. Sacroiliitis    7. Spondylosis of lumbar region without myelopathy or radiculopathy    8. Bilateral hip pain      UDS in order 11/16/23.  Discussed risks and benefits of opioid treatment for chonic pain with patient, including expectations related to prescription requests, alternative modalities to opioids for managing pain, her treatment plan, risks of dependency and addiction, and safe storage practices for prescribed opioids, as well as proper and  improper disposal of all medications.  Treatment plan will consist of continuing current medication as long as it remains effective and is necessary, while evaluating patient at each visit and determining if the medication can be lowered or discontinued, while also using nonopioid therapies to reduce reliance on opioids.  Increased to Norco 10mg QID prn, doing well now. Filled 10/28/24.   Patient's symptoms are still adequately managed by current medication regimen, is doing well at this strength and dosage, therefore I will continue to prescribe unchanged as the most appropriate course of treatment.  Constipation controlled with OTC stool softener.  Discussed MBB and RFA of b/l L3-5 facet joints in depth, he will consider it, likely schedule it next visit.  Performed b/l SIJ injections with good relief.  Performed b/l shoulder subacromial injections with same-day relief only, will not repeat, declines referral for surgical eval at this time.  May inject L knee, b/l hips in future. Had R TKA, then L TKA.  R knee brace to control multiplanar trace laxity noted  RTC in 3 months for f/u.      INSPECT REPORT     As part of the patient's treatment plan, I am prescribing controlled substances. The patient has been made aware of appropriate use of such medications, including potential risk of somnolence, limited ability to drive and/or work safely, and the potential for dependence or overdose. It has also bee made clear that these medications are for use by this patient only, without concomitant use of alcohol or other substances unless prescribed.      Patient has completed prescribing agreement detailing terms of continued prescribing of controlled substances, including monitoring INSPECT reports, urine drug screening, and pill counts if necessary. The patient is aware that inappropriate use will results in cessation of prescribing such medications.     INSPECT report has been reviewed and scanned into the patient's  chart.     As the clinician, I personally reviewed the INSPECT while the patient was in the office today.     History and physical exam exhibit continued safe and appropriate use of controlled substances.

## 2024-11-25 RX ORDER — OMEPRAZOLE 40 MG/1
40 CAPSULE, DELAYED RELEASE ORAL DAILY
Qty: 90 CAPSULE | Refills: 1 | Status: SHIPPED | OUTPATIENT
Start: 2024-11-25

## 2024-12-18 ENCOUNTER — TELEPHONE (OUTPATIENT)
Dept: FAMILY MEDICINE CLINIC | Facility: CLINIC | Age: 64
End: 2024-12-18
Payer: COMMERCIAL

## 2024-12-18 RX ORDER — ATORVASTATIN CALCIUM 20 MG/1
20 TABLET, FILM COATED ORAL DAILY
Qty: 90 TABLET | Refills: 0 | Status: SHIPPED | OUTPATIENT
Start: 2024-12-18

## 2024-12-18 NOTE — TELEPHONE ENCOUNTER
PATIENT STOPPED IN AND SAID HE IS LOSING HIS INSURANCE ON JAN 1 AND WANTED TO KNOW IF WE COULD SEND IN A 90 DAY SUPPLY OF ATORVASTATIN THAT HE CAN FILL WHILE HE STILL HAS INSURANCE.    EFRAÍN [place].

## 2025-01-10 NOTE — PROGRESS NOTES
CT did show 2 small nodules and recommended that we repeat study in 1 year  Goal Outcome Evaluation:            Pt scheduled for AM MRI with and w/o contrast. Pt slept sitting up all night due to cough. Takes meds whole. AOx4. BSCx1 due to gen wkns. Will DC once medically stable. No new complaints.

## 2025-02-20 ENCOUNTER — OFFICE VISIT (OUTPATIENT)
Dept: PAIN MEDICINE | Facility: CLINIC | Age: 65
End: 2025-02-20

## 2025-02-20 VITALS
OXYGEN SATURATION: 92 % | DIASTOLIC BLOOD PRESSURE: 92 MMHG | WEIGHT: 305 LBS | HEART RATE: 77 BPM | SYSTOLIC BLOOD PRESSURE: 136 MMHG | BODY MASS INDEX: 42.56 KG/M2 | RESPIRATION RATE: 16 BRPM

## 2025-02-20 DIAGNOSIS — M12.812 ROTATOR CUFF ARTHROPATHY OF BOTH SHOULDERS: ICD-10-CM

## 2025-02-20 DIAGNOSIS — M46.1 SACROILIITIS: ICD-10-CM

## 2025-02-20 DIAGNOSIS — M25.551 BILATERAL HIP PAIN: ICD-10-CM

## 2025-02-20 DIAGNOSIS — M47.816 SPONDYLOSIS OF LUMBAR REGION WITHOUT MYELOPATHY OR RADICULOPATHY: ICD-10-CM

## 2025-02-20 DIAGNOSIS — M79.605 PAIN IN BOTH LOWER EXTREMITIES: ICD-10-CM

## 2025-02-20 DIAGNOSIS — M25.562 CHRONIC PAIN OF LEFT KNEE: ICD-10-CM

## 2025-02-20 DIAGNOSIS — M79.604 PAIN IN BOTH LOWER EXTREMITIES: ICD-10-CM

## 2025-02-20 DIAGNOSIS — M25.512 CHRONIC PAIN OF BOTH SHOULDERS: ICD-10-CM

## 2025-02-20 DIAGNOSIS — G89.29 CHRONIC PAIN OF BOTH SHOULDERS: ICD-10-CM

## 2025-02-20 DIAGNOSIS — M54.50 CHRONIC MIDLINE LOW BACK PAIN WITHOUT SCIATICA: Primary | ICD-10-CM

## 2025-02-20 DIAGNOSIS — M25.511 CHRONIC PAIN OF BOTH SHOULDERS: ICD-10-CM

## 2025-02-20 DIAGNOSIS — M12.811 ROTATOR CUFF ARTHROPATHY OF BOTH SHOULDERS: ICD-10-CM

## 2025-02-20 DIAGNOSIS — G89.29 CHRONIC PAIN OF LEFT KNEE: ICD-10-CM

## 2025-02-20 DIAGNOSIS — G89.29 CHRONIC MIDLINE LOW BACK PAIN WITHOUT SCIATICA: Primary | ICD-10-CM

## 2025-02-20 DIAGNOSIS — M25.552 BILATERAL HIP PAIN: ICD-10-CM

## 2025-02-20 RX ORDER — OXYCODONE AND ACETAMINOPHEN 7.5; 325 MG/1; MG/1
1 TABLET ORAL EVERY 6 HOURS PRN
Qty: 120 TABLET | Refills: 0 | Status: SHIPPED | OUTPATIENT
Start: 2025-02-20

## 2025-02-20 NOTE — PROGRESS NOTES
Subjective   Chi Ramirez II is a 64 y.o. male.     History of Present Illness  Chronic low back pain, nonradiating, also left knee pain and b/l hip pain, 6/10 at worst, 3/10 at best, always present, varies, began 2018 with workplace injury, burning, tingling, worse with standing and walking, interferes with ADLs, sleep, failed PT. 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 Saw PCP, notes reviewed, as above, with referral for pain management, stable on Norco 10mg TID prn, no red flags notes. No FH of substance abuse. Increased to Norco 10mg QID prn with better relief. Worsening pain over b/l SIJ radiating to front, worse with standing after sitting, climbing down from truck. Also worsening pain over right CMC joint. Had acute pain in R  knee, went to ED, seen by Ortho, had knee drained and injected with steroid with some relief, upcoming f/u appt to review MRI. Had R TKA, then left.   Back Pain  Pertinent negatives include no abdominal pain, bladder incontinence, chest pain, fever, numbness or weakness.        The following portions of the patient's history were reviewed and updated as appropriate: allergies, current medications, past family history, past medical history, past social history, past surgical history and problem list.    Review of Systems   Constitutional:  Positive for fatigue. Negative for chills and fever.   HENT:  Positive for hearing loss. Negative for trouble swallowing.    Eyes:  Negative for visual disturbance.   Respiratory:  Positive for shortness of breath.    Cardiovascular:  Negative for chest pain.   Gastrointestinal:  Positive for constipation. Negative for abdominal pain, diarrhea, nausea and vomiting.   Genitourinary:  Negative for urinary incontinence.   Musculoskeletal:  Positive for arthralgias, back pain and joint swelling. Negative for myalgias and neck pain.   Neurological:  Positive for headache. Negative for dizziness, weakness and numbness.       Objective    Physical Exam  Constitutional:       Appearance: Normal appearance. He is well-developed.   HENT:      Head: Normocephalic and atraumatic.   Eyes:      Extraocular Movements: Extraocular movements intact.      Pupils: Pupils are equal, round, and reactive to light.   Cardiovascular:      Rate and Rhythm: Normal rate and regular rhythm.      Heart sounds: Normal heart sounds.   Pulmonary:      Effort: Pulmonary effort is normal.      Breath sounds: Normal breath sounds.   Abdominal:      General: Bowel sounds are normal. There is no distension.      Palpations: Abdomen is soft.      Tenderness: There is no abdominal tenderness.   Musculoskeletal:      Cervical back: Normal range of motion.      Comments: Pain in b/l groin with FADIR  B/l shoulder: (+) empty can test R>>L  Trace laxity in R knee ant/post and varus/valgus     Neurological:      Mental Status: He is alert and oriented to person, place, and time.      Sensory: No sensory deficit.      Deep Tendon Reflexes: Reflexes are normal and symmetric.   Psychiatric:         Mood and Affect: Mood normal.         Behavior: Behavior normal.         Thought Content: Thought content normal.         Judgment: Judgment normal.         Assessment & Plan   Diagnoses and all orders for this visit:    1. Chronic midline low back pain without sciatica (Primary)    2. Chronic pain of both shoulders    3. Chronic pain of left knee    4. Bilateral hip pain    5. Pain in both lower extremities    6. Rotator cuff arthropathy of both shoulders    7. Sacroiliitis    8. Spondylosis of lumbar region without myelopathy or radiculopathy      UDS in order 11/16/23.  Discussed risks and benefits of opioid treatment for chonic pain with patient, including expectations related to prescription requests, alternative modalities to opioids for managing pain, her treatment plan, risks of dependency and addiction, and safe storage practices for prescribed opioids, as well as proper and improper  disposal of all medications.  Treatment plan will consist of continuing current medication as long as it remains effective and is necessary, while evaluating patient at each visit and determining if the medication can be lowered or discontinued, while also using nonopioid therapies to reduce reliance on opioids.  Increased to Norco 10mg QID prn, tolerant. Rotate to Percocet 7.5mg QID prn. Filled 1/27/25.     Constipation controlled with OTC stool softener.  Discussed MBB and RFA of b/l L3-5 facet joints in depth, he will consider it, likely schedule it next visit.  Performed b/l SIJ injections with good relief.  Performed b/l shoulder subacromial injections with same-day relief only, will not repeat, declines referral for surgical eval at this time.  May inject L knee, b/l hips in future. Had R TKA, then L TKA.  R knee brace to control multiplanar trace laxity noted  RTC in 3 months for f/u.      INSPECT REPORT     As part of the patient's treatment plan, I am prescribing controlled substances. The patient has been made aware of appropriate use of such medications, including potential risk of somnolence, limited ability to drive and/or work safely, and the potential for dependence or overdose. It has also bee made clear that these medications are for use by this patient only, without concomitant use of alcohol or other substances unless prescribed.      Patient has completed prescribing agreement detailing terms of continued prescribing of controlled substances, including monitoring INSPECT reports, urine drug screening, and pill counts if necessary. The patient is aware that inappropriate use will results in cessation of prescribing such medications.     INSPECT report has been reviewed and scanned into the patient's chart.     As the clinician, I personally reviewed the INSPECT while the patient was in the office today.     History and physical exam exhibit continued safe and appropriate use of controlled  substances.

## 2025-03-13 DIAGNOSIS — I10 PRIMARY HYPERTENSION: ICD-10-CM

## 2025-03-13 RX ORDER — LOSARTAN POTASSIUM AND HYDROCHLOROTHIAZIDE 25; 100 MG/1; MG/1
1 TABLET ORAL DAILY
Qty: 90 TABLET | Refills: 1 | Status: SHIPPED | OUTPATIENT
Start: 2025-03-13 | End: 2025-03-17

## 2025-03-16 DIAGNOSIS — I10 PRIMARY HYPERTENSION: ICD-10-CM

## 2025-03-17 RX ORDER — LOSARTAN POTASSIUM AND HYDROCHLOROTHIAZIDE 25; 100 MG/1; MG/1
1 TABLET ORAL DAILY
Qty: 90 TABLET | Refills: 1 | Status: SHIPPED | OUTPATIENT
Start: 2025-03-17

## 2025-03-17 RX ORDER — ATORVASTATIN CALCIUM 20 MG/1
20 TABLET, FILM COATED ORAL DAILY
Qty: 90 TABLET | Refills: 0 | Status: SHIPPED | OUTPATIENT
Start: 2025-03-17

## 2025-03-19 DIAGNOSIS — I10 PRIMARY HYPERTENSION: ICD-10-CM

## 2025-03-19 RX ORDER — LOSARTAN POTASSIUM AND HYDROCHLOROTHIAZIDE 25; 100 MG/1; MG/1
1 TABLET ORAL DAILY
Qty: 90 TABLET | Refills: 1 | OUTPATIENT
Start: 2025-03-19

## 2025-05-02 ENCOUNTER — OFFICE VISIT (OUTPATIENT)
Dept: FAMILY MEDICINE CLINIC | Facility: CLINIC | Age: 65
End: 2025-05-02

## 2025-05-02 VITALS
SYSTOLIC BLOOD PRESSURE: 132 MMHG | BODY MASS INDEX: 44.18 KG/M2 | WEIGHT: 315 LBS | HEART RATE: 88 BPM | DIASTOLIC BLOOD PRESSURE: 86 MMHG | OXYGEN SATURATION: 91 %

## 2025-05-02 DIAGNOSIS — R60.0 BILATERAL LOWER EXTREMITY EDEMA: Primary | ICD-10-CM

## 2025-05-02 DIAGNOSIS — I10 PRIMARY HYPERTENSION: ICD-10-CM

## 2025-05-02 RX ORDER — FUROSEMIDE 80 MG/1
80 TABLET ORAL DAILY
Qty: 90 TABLET | Refills: 3 | Status: SHIPPED | OUTPATIENT
Start: 2025-05-02

## 2025-05-02 NOTE — PROGRESS NOTES
"Chief Complaint  Wound Check (Oozing from the shin area. Small puncture that is oozing. Hard to touch around the site. It has been about a week since the oozing has started. )    Subjective        Chi Ramirez II presents to [facility]  History of Present Illness  KW is a 63 y/o M with a PMHx of HTN and BL TKR being seen for BL leg swelling x 3 mo and Rt leg weeping x 2 weeks. Pt says he was placed on a water pill by his heart doctor about a year ago. Pt stopped taking this medication about 4 mo ago. Pt says his legs have been swelling for the last few months, but more recently his Rt leg has been weeping and turning his white socks light yellow from the stains. Pt reports swelling is worse at the end of the day. Pt reports stopping his diuretic Tx 2/2 the increased urination being inconvenient to his daily activities. Pt denies using any compression socks in past but is open to using them.  Pt denies trouble breathing when laying down, but also says he is using CPAP lately due to worsening of his sleep apnea. ROS positive for edema, occasional wheezing, and skin color change. Pt denies any chills, fever, nausea, vomiting, skin temperature changes, hemoptysis, syncope, CP, pleuritic CP.       Objective   Vital Signs:  /86   Pulse 88   Wt (!) 144 kg (316 lb 9.6 oz)   SpO2 91%   BMI 44.18 kg/m²   Estimated body mass index is 44.18 kg/m² as calculated from the following:    Height as of 10/15/24: 180.3 cm (70.98\").    Weight as of this encounter: 144 kg (316 lb 9.6 oz).          Physical Exam  Constitutional:       General: He is not in acute distress.     Appearance: Normal appearance. He is normal weight. He is not ill-appearing or toxic-appearing.   HENT:      Head: Normocephalic and atraumatic.   Eyes:      General: No scleral icterus.     Conjunctiva/sclera: Conjunctivae normal.   Cardiovascular:      Rate and Rhythm: Normal rate and regular rhythm.      Pulses: " Normal pulses.           Radial pulses are 2+ on the right side and 2+ on the left side.        Dorsalis pedis pulses are 2+ on the right side and 2+ on the left side.      Heart sounds: Normal heart sounds. No murmur heard.     No friction rub. No gallop.   Pulmonary:      Effort: Pulmonary effort is normal. No respiratory distress.      Breath sounds: No stridor. Wheezing (Likely Cardiac wheeze/cardiac asthma) and rales (BL diffuse rales worst in bases) present. No rhonchi.   Chest:      Chest wall: No tenderness.   Musculoskeletal:      Cervical back: Normal range of motion. No rigidity.      Right lower le+ Pitting Edema present.      Left lower le+ Pitting Edema present.   Lymphadenopathy:      Cervical: No cervical adenopathy.   Skin:     General: Skin is warm and dry.      Capillary Refill: Capillary refill takes less than 2 seconds.      Coloration: Skin is not jaundiced.      Findings: Erythema (BL erythema distal to the patella) present. No bruising, lesion or rash.      Comments: Minimal Leg weeping on anterior lower right leg   Neurological:      General: No focal deficit present.      Mental Status: He is alert and oriented to person, place, and time.      Cranial Nerves: Cranial nerves 2-12 are intact.      Sensory: Sensation is intact. No sensory deficit.      Motor: Motor function is intact. No weakness.        Result Review :                Assessment and Plan   Diagnoses and all orders for this visit:    1. Bilateral lower extremity edema (Primary)  2. Primary HTN        - Based on HPI and PE, Pt is currently fluid overloaded.        -  Pt advised to resume taking medications as Rx, specifically furosemide 80 mg daily, use compression, elevate legs during the day       - Discussed importance of adherence, he has open weeping area that will not heal until his swelling improves       - Not currently taking jardiance due to cost (currently self pay)       - Continue metoprolol succinate 25 mg  daily, losartan HCTZ 100-25 mg daily        -  Keep cardiology appt in July.         -     furosemide (Lasix) 80 MG tablet; Take 1 tablet by mouth Daily.  Dispense: 90 tablet; Refill: 3             Follow Up   Pending above   Patient was given instructions and counseling regarding his condition or for health maintenance advice. Please see specific information pulled into the AVS if appropriate.

## 2025-05-21 RX ORDER — OMEPRAZOLE 40 MG/1
40 CAPSULE, DELAYED RELEASE ORAL DAILY
Qty: 90 CAPSULE | Refills: 1 | Status: SHIPPED | OUTPATIENT
Start: 2025-05-21 | End: 2025-05-22

## 2025-05-22 ENCOUNTER — OFFICE VISIT (OUTPATIENT)
Dept: PAIN MEDICINE | Facility: CLINIC | Age: 65
End: 2025-05-22

## 2025-05-22 VITALS
DIASTOLIC BLOOD PRESSURE: 81 MMHG | HEART RATE: 85 BPM | BODY MASS INDEX: 44.51 KG/M2 | OXYGEN SATURATION: 93 % | WEIGHT: 315 LBS | SYSTOLIC BLOOD PRESSURE: 126 MMHG | RESPIRATION RATE: 16 BRPM

## 2025-05-22 DIAGNOSIS — M25.511 CHRONIC PAIN OF BOTH SHOULDERS: ICD-10-CM

## 2025-05-22 DIAGNOSIS — M12.812 ROTATOR CUFF ARTHROPATHY OF BOTH SHOULDERS: ICD-10-CM

## 2025-05-22 DIAGNOSIS — G89.29 CHRONIC PAIN OF LEFT KNEE: ICD-10-CM

## 2025-05-22 DIAGNOSIS — M79.605 PAIN IN BOTH LOWER EXTREMITIES: ICD-10-CM

## 2025-05-22 DIAGNOSIS — M25.512 CHRONIC PAIN OF BOTH SHOULDERS: ICD-10-CM

## 2025-05-22 DIAGNOSIS — M79.604 PAIN IN BOTH LOWER EXTREMITIES: ICD-10-CM

## 2025-05-22 DIAGNOSIS — G89.29 CHRONIC MIDLINE LOW BACK PAIN WITHOUT SCIATICA: Primary | ICD-10-CM

## 2025-05-22 DIAGNOSIS — M47.816 SPONDYLOSIS OF LUMBAR REGION WITHOUT MYELOPATHY OR RADICULOPATHY: ICD-10-CM

## 2025-05-22 DIAGNOSIS — G89.29 CHRONIC PAIN OF BOTH SHOULDERS: ICD-10-CM

## 2025-05-22 DIAGNOSIS — M12.811 ROTATOR CUFF ARTHROPATHY OF BOTH SHOULDERS: ICD-10-CM

## 2025-05-22 DIAGNOSIS — M25.562 CHRONIC PAIN OF LEFT KNEE: ICD-10-CM

## 2025-05-22 DIAGNOSIS — M54.50 CHRONIC MIDLINE LOW BACK PAIN WITHOUT SCIATICA: Primary | ICD-10-CM

## 2025-05-22 DIAGNOSIS — M46.1 SACROILIITIS: ICD-10-CM

## 2025-05-22 RX ORDER — OXYCODONE AND ACETAMINOPHEN 7.5; 325 MG/1; MG/1
1 TABLET ORAL EVERY 6 HOURS PRN
Qty: 120 TABLET | Refills: 0 | Status: SHIPPED | OUTPATIENT
Start: 2025-05-22

## 2025-05-22 RX ORDER — OMEPRAZOLE 40 MG/1
40 CAPSULE, DELAYED RELEASE ORAL DAILY
Qty: 90 CAPSULE | Refills: 1 | Status: SHIPPED | OUTPATIENT
Start: 2025-05-22

## 2025-05-22 NOTE — PROGRESS NOTES
Subjective   Chi Ramirez II is a 64 y.o. male.     History of Present Illness  Chronic low back pain, nonradiating, also left knee pain and b/l hip pain, 6/10 at worst, 3/10 at best, always present, varies, began 2018 with workplace injury, burning, tingling, worse with standing and walking, interferes with ADLs, sleep, failed PT. 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 Saw PCP, notes reviewed, as above, with referral for pain management, stable on Norco 10mg TID prn, no red flags notes. No FH of substance abuse. Increased to Norco 10mg QID prn with better relief, rotated to Percocet 7.5mg QID prn with better relief. Worsening pain over b/l SIJ radiating to front, worse with standing after sitting, climbing down from truck. Also worsening pain over right CMC joint. Had acute pain in R  knee, went to ED, seen by Ortho, had knee drained and injected with steroid with some relief. Had R TKA, then left, still with severe pain in right knee, lower back.   Back Pain  Associated symptoms: no abdominal pain, no bladder incontinence, no chest pain, no fever, no numbness and no weakness         The following portions of the patient's history were reviewed and updated as appropriate: allergies, current medications, past family history, past medical history, past social history, past surgical history and problem list.    Review of Systems   Constitutional:  Positive for fatigue. Negative for chills and fever.   HENT:  Positive for hearing loss. Negative for trouble swallowing.    Eyes:  Negative for visual disturbance.   Respiratory:  Positive for shortness of breath.    Cardiovascular:  Negative for chest pain.   Gastrointestinal:  Positive for constipation. Negative for abdominal pain, diarrhea, nausea and vomiting.   Genitourinary:  Negative for urinary incontinence.   Musculoskeletal:  Positive for arthralgias, back pain and joint swelling. Negative for myalgias and neck pain.   Neurological:  Positive for  headache. Negative for dizziness, weakness and numbness.       Objective   Physical Exam  Constitutional:       Appearance: Normal appearance. He is well-developed.   HENT:      Head: Normocephalic and atraumatic.   Eyes:      Extraocular Movements: Extraocular movements intact.      Pupils: Pupils are equal, round, and reactive to light.   Cardiovascular:      Rate and Rhythm: Normal rate and regular rhythm.      Heart sounds: Normal heart sounds.   Pulmonary:      Effort: Pulmonary effort is normal.      Breath sounds: Normal breath sounds.   Abdominal:      General: Bowel sounds are normal. There is no distension.      Palpations: Abdomen is soft.      Tenderness: There is no abdominal tenderness.   Musculoskeletal:      Cervical back: Normal range of motion.      Comments: Pain in b/l groin with FADIR  B/l shoulder: (+) empty can test R>>L  Trace laxity in R knee ant/post and varus/valgus     Neurological:      Mental Status: He is alert and oriented to person, place, and time.      Sensory: No sensory deficit.      Deep Tendon Reflexes: Reflexes are normal and symmetric.   Psychiatric:         Mood and Affect: Mood normal.         Behavior: Behavior normal.         Thought Content: Thought content normal.         Judgment: Judgment normal.         Assessment & Plan   Diagnoses and all orders for this visit:    1. Chronic midline low back pain without sciatica (Primary)    2. Chronic pain of both shoulders    3. Chronic pain of left knee    4. Pain in both lower extremities    5. Rotator cuff arthropathy of both shoulders    6. Sacroiliitis    7. Spondylosis of lumbar region without myelopathy or radiculopathy      UDS in order 11/21/24.  Discussed risks and benefits of opioid treatment for chonic pain with patient, including expectations related to prescription requests, alternative modalities to opioids for managing pain, her treatment plan, risks of dependency and addiction, and safe storage practices for  prescribed opioids, as well as proper and improper disposal of all medications.  Treatment plan will consist of continuing current medication as long as it remains effective and is necessary, while evaluating patient at each visit and determining if the medication can be lowered or discontinued, while also using nonopioid therapies to reduce reliance on opioids.  Increased to Norco 10mg QID prn, tolerant. Rotated to Percocet 7.5mg QID prn. Filled 4/27/25.     Constipation controlled with OTC stool softener.  Discussed MBB and RFA of b/l L3-5 facet joints in depth, he will consider it, likely schedule it next visit.  Performed b/l SIJ injections with good relief.  Performed b/l shoulder subacromial injections with same-day relief only, will not repeat, declines referral for surgical eval at this time.  May inject L knee, b/l hips in future. Had R TKA, then L TKA.  R knee brace to control multiplanar trace laxity noted  RTC in 3 months for f/u.      INSPECT REPORT     As part of the patient's treatment plan, I am prescribing controlled substances. The patient has been made aware of appropriate use of such medications, including potential risk of somnolence, limited ability to drive and/or work safely, and the potential for dependence or overdose. It has also bee made clear that these medications are for use by this patient only, without concomitant use of alcohol or other substances unless prescribed.      Patient has completed prescribing agreement detailing terms of continued prescribing of controlled substances, including monitoring INSPECT reports, urine drug screening, and pill counts if necessary. The patient is aware that inappropriate use will results in cessation of prescribing such medications.     INSPECT report has been reviewed and scanned into the patient's chart.     As the clinician, I personally reviewed the INSPECT while the patient was in the office today.     History and physical exam exhibit  continued safe and appropriate use of controlled substances.

## 2025-06-23 RX ORDER — ATORVASTATIN CALCIUM 20 MG/1
20 TABLET, FILM COATED ORAL DAILY
Qty: 90 TABLET | Refills: 0 | Status: SHIPPED | OUTPATIENT
Start: 2025-06-23

## 2025-08-21 ENCOUNTER — OFFICE VISIT (OUTPATIENT)
Dept: PAIN MEDICINE | Facility: CLINIC | Age: 65
End: 2025-08-21
Payer: MEDICAID

## 2025-08-21 VITALS
SYSTOLIC BLOOD PRESSURE: 137 MMHG | HEART RATE: 71 BPM | RESPIRATION RATE: 16 BRPM | OXYGEN SATURATION: 92 % | DIASTOLIC BLOOD PRESSURE: 89 MMHG | WEIGHT: 315 LBS | BODY MASS INDEX: 44.09 KG/M2

## 2025-08-21 DIAGNOSIS — M25.511 CHRONIC PAIN OF BOTH SHOULDERS: ICD-10-CM

## 2025-08-21 DIAGNOSIS — M54.50 CHRONIC MIDLINE LOW BACK PAIN WITHOUT SCIATICA: Primary | ICD-10-CM

## 2025-08-21 DIAGNOSIS — G89.29 CHRONIC MIDLINE LOW BACK PAIN WITHOUT SCIATICA: Primary | ICD-10-CM

## 2025-08-21 DIAGNOSIS — M25.512 CHRONIC PAIN OF BOTH SHOULDERS: ICD-10-CM

## 2025-08-21 DIAGNOSIS — G89.29 CHRONIC PAIN OF LEFT KNEE: ICD-10-CM

## 2025-08-21 DIAGNOSIS — M25.562 CHRONIC PAIN OF LEFT KNEE: ICD-10-CM

## 2025-08-21 DIAGNOSIS — M25.551 BILATERAL HIP PAIN: ICD-10-CM

## 2025-08-21 DIAGNOSIS — M25.552 BILATERAL HIP PAIN: ICD-10-CM

## 2025-08-21 DIAGNOSIS — G89.29 CHRONIC PAIN OF BOTH SHOULDERS: ICD-10-CM

## 2025-08-21 DIAGNOSIS — M46.1 SACROILIITIS: ICD-10-CM

## 2025-08-21 RX ORDER — OXYCODONE AND ACETAMINOPHEN 7.5; 325 MG/1; MG/1
1 TABLET ORAL EVERY 6 HOURS PRN
Qty: 120 TABLET | Refills: 0 | Status: SHIPPED | OUTPATIENT
Start: 2025-08-21